# Patient Record
Sex: FEMALE | Race: WHITE | NOT HISPANIC OR LATINO | Employment: OTHER | URBAN - METROPOLITAN AREA
[De-identification: names, ages, dates, MRNs, and addresses within clinical notes are randomized per-mention and may not be internally consistent; named-entity substitution may affect disease eponyms.]

---

## 2017-11-20 ENCOUNTER — HISTORICAL (OUTPATIENT)
Dept: INTENSIVE CARE | Facility: HOSPITAL | Age: 47
End: 2017-11-20

## 2017-11-21 LAB
ABS NEUT (OLG): 8.38 X10(3)/MCL (ref 2.1–9.2)
ALBUMIN SERPL-MCNC: 2.5 GM/DL (ref 3.4–5)
ALBUMIN/GLOB SERPL: 0.8 {RATIO}
ALP SERPL-CCNC: 107 UNIT/L (ref 38–126)
ALT SERPL-CCNC: 27 UNIT/L (ref 12–78)
AST SERPL-CCNC: 36 UNIT/L (ref 15–37)
BASOPHILS # BLD AUTO: 0 X10(3)/MCL (ref 0–0.2)
BASOPHILS NFR BLD AUTO: 0 %
BILIRUB SERPL-MCNC: 0.3 MG/DL (ref 0.2–1)
BILIRUBIN DIRECT+TOT PNL SERPL-MCNC: 0.1 MG/DL (ref 0–0.2)
BILIRUBIN DIRECT+TOT PNL SERPL-MCNC: 0.2 MG/DL (ref 0–0.8)
BUN SERPL-MCNC: 13 MG/DL (ref 7–18)
CALCIUM SERPL-MCNC: 7.6 MG/DL (ref 8.5–10.1)
CHLORIDE SERPL-SCNC: 107 MMOL/L (ref 98–107)
CO2 SERPL-SCNC: 23 MMOL/L (ref 21–32)
CREAT SERPL-MCNC: 0.58 MG/DL (ref 0.55–1.02)
EOSINOPHIL # BLD AUTO: 0.1 X10(3)/MCL (ref 0–0.9)
EOSINOPHIL NFR BLD AUTO: 1 %
ERYTHROCYTE [DISTWIDTH] IN BLOOD BY AUTOMATED COUNT: 18.9 % (ref 11.5–17)
GLOBULIN SER-MCNC: 3.3 GM/DL (ref 2.4–3.5)
GLUCOSE SERPL-MCNC: 128 MG/DL (ref 74–106)
HCT VFR BLD AUTO: 32.8 % (ref 37–47)
HGB BLD-MCNC: 10 GM/DL (ref 12–16)
LYMPHOCYTES # BLD AUTO: 2.3 X10(3)/MCL (ref 0.6–4.6)
LYMPHOCYTES NFR BLD AUTO: 20 %
MCH RBC QN AUTO: 30.9 PG (ref 27–31)
MCHC RBC AUTO-ENTMCNC: 30.5 GM/DL (ref 33–36)
MCV RBC AUTO: 101.2 FL (ref 80–94)
MONOCYTES # BLD AUTO: 0.3 X10(3)/MCL (ref 0.1–1.3)
MONOCYTES NFR BLD AUTO: 3 %
NEUTROPHILS # BLD AUTO: 8.38 X10(3)/MCL (ref 1.4–7.9)
NEUTROPHILS NFR BLD AUTO: 75 %
PLATELET # BLD AUTO: 187 X10(3)/MCL (ref 130–400)
PMV BLD AUTO: 10.1 FL (ref 9.4–12.4)
POTASSIUM SERPL-SCNC: 3.9 MMOL/L (ref 3.5–5.1)
PROT SERPL-MCNC: 5.8 GM/DL (ref 6.4–8.2)
RBC # BLD AUTO: 3.24 X10(6)/MCL (ref 4.2–5.4)
SODIUM SERPL-SCNC: 139 MMOL/L (ref 136–145)
WBC # SPEC AUTO: 11.2 X10(3)/MCL (ref 4.5–11.5)

## 2018-01-31 ENCOUNTER — HISTORICAL (OUTPATIENT)
Dept: ADMINISTRATIVE | Facility: HOSPITAL | Age: 48
End: 2018-01-31

## 2018-01-31 LAB
ABS NEUT (OLG): 6.29 X10(3)/MCL (ref 2.1–9.2)
BASOPHILS # BLD AUTO: 0.1 X10(3)/MCL (ref 0–0.2)
BASOPHILS NFR BLD AUTO: 0.6 %
EOSINOPHIL # BLD AUTO: 0.2 X10(3)/MCL (ref 0–0.9)
EOSINOPHIL NFR BLD AUTO: 2.5 %
ERYTHROCYTE [DISTWIDTH] IN BLOOD BY AUTOMATED COUNT: 18.5 % (ref 11.5–17)
HCT VFR BLD AUTO: 29.9 % (ref 37–47)
HGB BLD-MCNC: 9.5 GM/DL (ref 12–16)
LYMPHOCYTES # BLD AUTO: 2.2 X10(3)/MCL (ref 0.6–4.6)
LYMPHOCYTES NFR BLD AUTO: 23.3 %
MCH RBC QN AUTO: 30.4 PG (ref 27–31)
MCHC RBC AUTO-ENTMCNC: 31.8 GM/DL (ref 33–36)
MCV RBC AUTO: 95.8 FL (ref 80–94)
MONOCYTES # BLD AUTO: 0.8 X10(3)/MCL (ref 0.1–1.3)
MONOCYTES NFR BLD AUTO: 7.9 %
NEUTROPHILS # BLD AUTO: 6.3 X10(3)/MCL (ref 2.1–9.2)
NEUTROPHILS NFR BLD AUTO: 65.7 %
PLATELET # BLD AUTO: 135 X10(3)/MCL (ref 130–400)
PMV BLD AUTO: 9.7 FL (ref 9.4–12.4)
RBC # BLD AUTO: 3.12 X10(6)/MCL (ref 4.2–5.4)
WBC # SPEC AUTO: 9.6 X10(3)/MCL (ref 4.5–11.5)

## 2018-05-14 ENCOUNTER — HISTORICAL (OUTPATIENT)
Dept: ADMINISTRATIVE | Facility: HOSPITAL | Age: 48
End: 2018-05-14

## 2018-05-14 LAB
ABS NEUT (OLG): 4.02 X10(3)/MCL (ref 2.1–9.2)
BASOPHILS # BLD AUTO: 0 X10(3)/MCL (ref 0–0.2)
BASOPHILS NFR BLD AUTO: 0.4 %
EOSINOPHIL # BLD AUTO: 0 X10(3)/MCL (ref 0–0.9)
EOSINOPHIL NFR BLD AUTO: 0.3 %
ERYTHROCYTE [DISTWIDTH] IN BLOOD BY AUTOMATED COUNT: 18.4 % (ref 11.5–17)
HCT VFR BLD AUTO: 34.2 % (ref 37–47)
HGB BLD-MCNC: 10.9 GM/DL (ref 12–16)
LYMPHOCYTES # BLD AUTO: 2.9 X10(3)/MCL (ref 0.6–4.6)
LYMPHOCYTES NFR BLD AUTO: 39.8 %
MCH RBC QN AUTO: 32.5 PG (ref 27–31)
MCHC RBC AUTO-ENTMCNC: 31.9 GM/DL (ref 33–36)
MCV RBC AUTO: 102.1 FL (ref 80–94)
MONOCYTES # BLD AUTO: 0.3 X10(3)/MCL (ref 0.1–1.3)
MONOCYTES NFR BLD AUTO: 3.9 %
NEUTROPHILS # BLD AUTO: 4 X10(3)/MCL (ref 2.1–9.2)
NEUTROPHILS NFR BLD AUTO: 55.6 %
PLATELET # BLD AUTO: 88 X10(3)/MCL (ref 130–400)
PMV BLD AUTO: 10.7 FL (ref 9.4–12.4)
RBC # BLD AUTO: 3.35 X10(6)/MCL (ref 4.2–5.4)
WBC # SPEC AUTO: 7.2 X10(3)/MCL (ref 4.5–11.5)

## 2018-05-19 ENCOUNTER — HOSPITAL ENCOUNTER (OUTPATIENT)
Dept: ADMINISTRATIVE | Facility: HOSPITAL | Age: 48
End: 2018-05-21
Attending: FAMILY MEDICINE | Admitting: FAMILY MEDICINE

## 2018-05-19 LAB
ABS NEUT (OLG): 2.16 X10(3)/MCL (ref 2.1–9.2)
ALBUMIN SERPL-MCNC: 2.5 GM/DL (ref 3.4–5)
ALBUMIN/GLOB SERPL: 0.6 {RATIO}
ALP SERPL-CCNC: 165 UNIT/L (ref 38–126)
ALT SERPL-CCNC: 21 UNIT/L (ref 12–78)
APPEARANCE, UA: CLEAR
AST SERPL-CCNC: 83 UNIT/L (ref 15–37)
BACTERIA SPEC CULT: ABNORMAL /HPF
BASOPHILS # BLD AUTO: 0 X10(3)/MCL (ref 0–0.2)
BASOPHILS NFR BLD AUTO: 0 %
BILIRUB SERPL-MCNC: 0.7 MG/DL (ref 0.2–1)
BILIRUB UR QL STRIP: NEGATIVE
BILIRUBIN DIRECT+TOT PNL SERPL-MCNC: 0.1 MG/DL (ref 0–0.5)
BILIRUBIN DIRECT+TOT PNL SERPL-MCNC: 0.6 MG/DL (ref 0–0.8)
BNP BLD-MCNC: 99 PG/ML (ref 0–100)
BUN SERPL-MCNC: 6 MG/DL (ref 7–18)
CALCIUM SERPL-MCNC: 7.6 MG/DL (ref 8.5–10.1)
CHLORIDE SERPL-SCNC: 109 MMOL/L (ref 98–107)
CO2 SERPL-SCNC: 23 MMOL/L (ref 21–32)
COLOR UR: ABNORMAL
CREAT SERPL-MCNC: 0.86 MG/DL (ref 0.55–1.02)
EOSINOPHIL # BLD AUTO: 0 X10(3)/MCL (ref 0–0.9)
EOSINOPHIL NFR BLD AUTO: 0 %
ERYTHROCYTE [DISTWIDTH] IN BLOOD BY AUTOMATED COUNT: 17.9 % (ref 11.5–17)
GLOBULIN SER-MCNC: 4.4 GM/DL (ref 2.4–3.5)
GLUCOSE (UA): NEGATIVE
GLUCOSE SERPL-MCNC: 103 MG/DL (ref 74–106)
HCT VFR BLD AUTO: 36 % (ref 37–47)
HGB BLD-MCNC: 11.5 GM/DL (ref 12–16)
HGB UR QL STRIP: NEGATIVE
KETONES UR QL STRIP: NEGATIVE
LEUKOCYTE ESTERASE UR QL STRIP: ABNORMAL
LIPASE SERPL-CCNC: 63 UNIT/L (ref 73–393)
LYMPHOCYTES # BLD AUTO: 3.1 X10(3)/MCL (ref 0.6–4.6)
LYMPHOCYTES NFR BLD AUTO: 56 %
MCH RBC QN AUTO: 32.9 PG (ref 27–31)
MCHC RBC AUTO-ENTMCNC: 31.9 GM/DL (ref 33–36)
MCV RBC AUTO: 102.9 FL (ref 80–94)
MONOCYTES # BLD AUTO: 0.2 X10(3)/MCL (ref 0.1–1.3)
MONOCYTES NFR BLD AUTO: 4 %
NEUTROPHILS # BLD AUTO: 2.16 X10(3)/MCL (ref 1.4–7.9)
NEUTROPHILS NFR BLD AUTO: 39 %
NITRITE UR QL STRIP: NEGATIVE
PH UR STRIP: 6.5 [PH] (ref 5–9)
PLATELET # BLD AUTO: 84 X10(3)/MCL (ref 130–400)
PMV BLD AUTO: 11.7 FL (ref 9.4–12.4)
POC TROPONIN: 0.01 NG/ML (ref 0–0.08)
POTASSIUM SERPL-SCNC: 3.4 MMOL/L (ref 3.5–5.1)
PROT SERPL-MCNC: 6.9 GM/DL (ref 6.4–8.2)
PROT UR QL STRIP: ABNORMAL
RBC # BLD AUTO: 3.5 X10(6)/MCL (ref 4.2–5.4)
RBC #/AREA URNS HPF: ABNORMAL /[HPF]
SODIUM SERPL-SCNC: 141 MMOL/L (ref 136–145)
SP GR UR STRIP: 1.02 (ref 1–1.03)
SQUAMOUS EPITHELIAL, UA: ABNORMAL
UA WBC MAN: ABNORMAL /HPF
UROBILINOGEN UR STRIP-ACNC: 1
WBC # SPEC AUTO: 5.6 X10(3)/MCL (ref 4.5–11.5)
WBC #/AREA URNS HPF: ABNORMAL /[HPF]

## 2018-05-20 LAB
ABS NEUT (OLG): 1.79 X10(3)/MCL (ref 2.1–9.2)
ALBUMIN SERPL-MCNC: 2 GM/DL (ref 3.4–5)
ALBUMIN/GLOB SERPL: 0.6 {RATIO}
ALP SERPL-CCNC: 133 UNIT/L (ref 38–126)
ALT SERPL-CCNC: 17 UNIT/L (ref 12–78)
ANISOCYTOSIS BLD QL SMEAR: 2
AST SERPL-CCNC: 68 UNIT/L (ref 15–37)
BILIRUB SERPL-MCNC: 0.5 MG/DL (ref 0.2–1)
BILIRUBIN DIRECT+TOT PNL SERPL-MCNC: 0.2 MG/DL (ref 0–0.2)
BILIRUBIN DIRECT+TOT PNL SERPL-MCNC: 0.3 MG/DL (ref 0–0.8)
BUN SERPL-MCNC: 5 MG/DL (ref 7–18)
CALCIUM SERPL-MCNC: 6.9 MG/DL (ref 8.5–10.1)
CHLORIDE SERPL-SCNC: 111 MMOL/L (ref 98–107)
CO2 SERPL-SCNC: 23 MMOL/L (ref 21–32)
CREAT SERPL-MCNC: 0.76 MG/DL (ref 0.55–1.02)
ERYTHROCYTE [DISTWIDTH] IN BLOOD BY AUTOMATED COUNT: 17.8 % (ref 11.5–17)
GLOBULIN SER-MCNC: 3.5 GM/DL (ref 2.4–3.5)
GLUCOSE SERPL-MCNC: 75 MG/DL (ref 74–106)
HCT VFR BLD AUTO: 29.9 % (ref 37–47)
HGB BLD-MCNC: 9.4 GM/DL (ref 12–16)
LYMPHOCYTES NFR BLD MANUAL: 45 % (ref 13–40)
MAGNESIUM SERPL-MCNC: 1.9 MG/DL (ref 1.8–2.4)
MCH RBC QN AUTO: 32.1 PG (ref 27–31)
MCHC RBC AUTO-ENTMCNC: 31.4 GM/DL (ref 33–36)
MCV RBC AUTO: 102 FL (ref 80–94)
MYELOCYTES NFR BLD MANUAL: 1 %
NEUTROPHILS NFR BLD MANUAL: 54 % (ref 47–80)
OVALOCYTES BLD QL SMEAR: 1
PLATELET # BLD AUTO: 73 X10(3)/MCL (ref 130–400)
PLATELET # BLD EST: ABNORMAL 10*3/UL
PMV BLD AUTO: 11.5 FL (ref 7.4–10.4)
POIKILOCYTOSIS BLD QL SMEAR: 2
POLYCHROMASIA BLD QL SMEAR: 1
POTASSIUM SERPL-SCNC: 3.2 MMOL/L (ref 3.5–5.1)
PROT SERPL-MCNC: 5.5 GM/DL (ref 6.4–8.2)
RBC # BLD AUTO: 2.93 X10(6)/MCL (ref 4.2–5.4)
SCHISTOCYTES BLD QL AUTO: 1
SODIUM SERPL-SCNC: 143 MMOL/L (ref 136–145)
T4 FREE SERPL-MCNC: 1.17 NG/DL (ref 0.76–4.46)
TARGETS BLD QL SMEAR: 1
TSH SERPL-ACNC: 5.11 MIU/L (ref 0.36–3.74)
WBC # SPEC AUTO: 5.3 X10(3)/MCL (ref 4.5–11.5)

## 2018-05-21 LAB
BUN SERPL-MCNC: 7 MG/DL (ref 7–18)
CALCIUM SERPL-MCNC: 7.5 MG/DL (ref 8.5–10.1)
CHLORIDE SERPL-SCNC: 114 MMOL/L (ref 98–107)
CO2 SERPL-SCNC: 20 MMOL/L (ref 21–32)
CREAT SERPL-MCNC: 0.82 MG/DL (ref 0.55–1.02)
CREAT/UREA NIT SERPL: 8.5
ERYTHROCYTE [DISTWIDTH] IN BLOOD BY AUTOMATED COUNT: 18 % (ref 11.5–17)
GLUCOSE SERPL-MCNC: 159 MG/DL (ref 74–106)
HCT VFR BLD AUTO: 33.3 % (ref 37–47)
HGB BLD-MCNC: 10.3 GM/DL (ref 12–16)
MCH RBC QN AUTO: 32.4 PG (ref 27–31)
MCHC RBC AUTO-ENTMCNC: 30.9 GM/DL (ref 33–36)
MCV RBC AUTO: 104.7 FL (ref 80–94)
PLATELET # BLD AUTO: 72 X10(3)/MCL (ref 130–400)
PMV BLD AUTO: 12.5 FL (ref 9.4–12.4)
POTASSIUM SERPL-SCNC: 4.4 MMOL/L (ref 3.5–5.1)
RBC # BLD AUTO: 3.18 X10(6)/MCL (ref 4.2–5.4)
SODIUM SERPL-SCNC: 143 MMOL/L (ref 136–145)
WBC # SPEC AUTO: 2.6 X10(3)/MCL (ref 4.5–11.5)

## 2018-08-02 LAB
ABS NEUT (OLG): 5.02 X10(3)/MCL (ref 2.1–9.2)
ALBUMIN SERPL-MCNC: 2.7 GM/DL (ref 3.4–5)
ALBUMIN/GLOB SERPL: 0.7 RATIO (ref 1.1–2)
ALP SERPL-CCNC: 188 UNIT/L (ref 38–126)
ALT SERPL-CCNC: 13 UNIT/L (ref 12–78)
APTT PPP: 73.6 SECOND(S) (ref 24.8–36.9)
AST SERPL-CCNC: 50 UNIT/L (ref 15–37)
BASOPHILS # BLD AUTO: 0 X10(3)/MCL (ref 0–0.2)
BASOPHILS NFR BLD AUTO: 0 %
BILIRUB SERPL-MCNC: 0.8 MG/DL (ref 0.2–1)
BILIRUBIN DIRECT+TOT PNL SERPL-MCNC: 0.4 MG/DL (ref 0–0.5)
BILIRUBIN DIRECT+TOT PNL SERPL-MCNC: 0.4 MG/DL (ref 0–0.8)
BUN SERPL-MCNC: 8 MG/DL (ref 7–18)
CALCIUM SERPL-MCNC: 7.8 MG/DL (ref 8.5–10.1)
CHLORIDE SERPL-SCNC: 107 MMOL/L (ref 98–107)
CHOLEST SERPL-MCNC: 67 MG/DL (ref 0–200)
CHOLEST/HDLC SERPL: 3.9 {RATIO} (ref 0–4)
CO2 SERPL-SCNC: 22 MMOL/L (ref 21–32)
CREAT SERPL-MCNC: 0.79 MG/DL (ref 0.55–1.02)
ERYTHROCYTE [DISTWIDTH] IN BLOOD BY AUTOMATED COUNT: 15.9 % (ref 11.5–17)
GLOBULIN SER-MCNC: 3.9 GM/DL (ref 2.4–3.5)
GLUCOSE SERPL-MCNC: 107 MG/DL (ref 74–106)
GROUP & RH: NORMAL
HCT VFR BLD AUTO: 30 % (ref 37–47)
HDLC SERPL-MCNC: 17 MG/DL (ref 35–60)
HGB BLD-MCNC: 9.9 GM/DL (ref 12–16)
INR PPP: 1.27 (ref 0–1.27)
LDLC SERPL CALC-MCNC: 21 MG/DL (ref 0–129)
LYMPHOCYTES # BLD AUTO: 2.3 X10(3)/MCL (ref 0.6–4.6)
LYMPHOCYTES NFR BLD AUTO: 30 %
MCH RBC QN AUTO: 30.8 PG (ref 27–31)
MCHC RBC AUTO-ENTMCNC: 33 GM/DL (ref 33–36)
MCV RBC AUTO: 93.5 FL (ref 80–94)
MONOCYTES # BLD AUTO: 0.3 X10(3)/MCL (ref 0.1–1.3)
MONOCYTES NFR BLD AUTO: 4 %
NEUTROPHILS # BLD AUTO: 5.02 X10(3)/MCL (ref 1.4–7.9)
NEUTROPHILS NFR BLD AUTO: 65 %
PLATELET # BLD AUTO: 72 X10(3)/MCL (ref 130–400)
PMV BLD AUTO: 10.9 FL (ref 9.4–12.4)
POTASSIUM SERPL-SCNC: 3 MMOL/L (ref 3.5–5.1)
PROT SERPL-MCNC: 6.6 GM/DL (ref 6.4–8.2)
PROTHROMBIN TIME: 16.3 SECOND(S) (ref 12.2–14.7)
RBC # BLD AUTO: 3.21 X10(6)/MCL (ref 4.2–5.4)
SODIUM SERPL-SCNC: 138 MMOL/L (ref 136–145)
TRIGL SERPL-MCNC: 145 MG/DL (ref 30–150)
VLDLC SERPL CALC-MCNC: 29 MG/DL
WBC # SPEC AUTO: 7.8 X10(3)/MCL (ref 4.5–11.5)

## 2018-08-03 ENCOUNTER — HISTORICAL (OUTPATIENT)
Dept: MEDSURG UNIT | Facility: HOSPITAL | Age: 48
End: 2018-08-03

## 2018-08-04 LAB
ABS NEUT (OLG): 3.75 X10(3)/MCL (ref 2.1–9.2)
ALBUMIN SERPL-MCNC: 2.2 GM/DL (ref 3.4–5)
ALBUMIN/GLOB SERPL: 0.6 {RATIO}
ALP SERPL-CCNC: 162 UNIT/L (ref 38–126)
ALT SERPL-CCNC: 15 UNIT/L (ref 12–78)
AST SERPL-CCNC: 71 UNIT/L (ref 15–37)
BASOPHILS # BLD AUTO: 0 X10(3)/MCL (ref 0–0.2)
BASOPHILS NFR BLD AUTO: 0 %
BILIRUB SERPL-MCNC: 0.4 MG/DL (ref 0.2–1)
BILIRUBIN DIRECT+TOT PNL SERPL-MCNC: 0.1 MG/DL (ref 0–0.8)
BILIRUBIN DIRECT+TOT PNL SERPL-MCNC: 0.3 MG/DL (ref 0–0.2)
BUN SERPL-MCNC: 9 MG/DL (ref 7–18)
BUN SERPL-MCNC: 9 MG/DL (ref 7–18)
CALCIUM SERPL-MCNC: 7 MG/DL (ref 8.5–10.1)
CALCIUM SERPL-MCNC: 7 MG/DL (ref 8.5–10.1)
CHLORIDE SERPL-SCNC: 111 MMOL/L (ref 98–107)
CHLORIDE SERPL-SCNC: 111 MMOL/L (ref 98–107)
CO2 SERPL-SCNC: 21 MMOL/L (ref 21–32)
CO2 SERPL-SCNC: 21 MMOL/L (ref 21–32)
CREAT SERPL-MCNC: 0.86 MG/DL (ref 0.55–1.02)
CREAT SERPL-MCNC: 0.86 MG/DL (ref 0.55–1.02)
CREAT/UREA NIT SERPL: 10.5
ERYTHROCYTE [DISTWIDTH] IN BLOOD BY AUTOMATED COUNT: 16.5 % (ref 11.5–17)
GLOBULIN SER-MCNC: 3.7 GM/DL (ref 2.4–3.5)
GLUCOSE SERPL-MCNC: 162 MG/DL (ref 74–106)
GLUCOSE SERPL-MCNC: 162 MG/DL (ref 74–106)
HCT VFR BLD AUTO: 29.2 % (ref 37–47)
HGB BLD-MCNC: 9.1 GM/DL (ref 12–16)
LYMPHOCYTES # BLD AUTO: 1 X10(3)/MCL (ref 0.6–4.6)
LYMPHOCYTES NFR BLD AUTO: 21 %
MCH RBC QN AUTO: 30.3 PG (ref 27–31)
MCHC RBC AUTO-ENTMCNC: 31.2 GM/DL (ref 33–36)
MCV RBC AUTO: 97.3 FL (ref 80–94)
MONOCYTES # BLD AUTO: 0.2 X10(3)/MCL (ref 0.1–1.3)
MONOCYTES NFR BLD AUTO: 3 %
NEUTROPHILS # BLD AUTO: 3.75 X10(3)/MCL (ref 1.4–7.9)
NEUTROPHILS NFR BLD AUTO: 75 %
PLATELET # BLD AUTO: 66 X10(3)/MCL (ref 130–400)
PMV BLD AUTO: 12.7 FL (ref 9.4–12.4)
POTASSIUM SERPL-SCNC: 4 MMOL/L (ref 3.5–5.1)
POTASSIUM SERPL-SCNC: 4 MMOL/L (ref 3.5–5.1)
PROT SERPL-MCNC: 5.9 GM/DL (ref 6.4–8.2)
RBC # BLD AUTO: 3 X10(6)/MCL (ref 4.2–5.4)
SODIUM SERPL-SCNC: 142 MMOL/L (ref 136–145)
SODIUM SERPL-SCNC: 142 MMOL/L (ref 136–145)
WBC # SPEC AUTO: 5 X10(3)/MCL (ref 4.5–11.5)

## 2019-01-12 ENCOUNTER — HOSPITAL ENCOUNTER (OUTPATIENT)
Dept: MEDSURG UNIT | Facility: HOSPITAL | Age: 49
End: 2019-01-14
Attending: EMERGENCY MEDICINE | Admitting: SURGERY

## 2019-01-12 LAB
ABS NEUT (OLG): 8.12 X10(3)/MCL (ref 2.1–9.2)
ALBUMIN SERPL-MCNC: 2.6 GM/DL (ref 3.4–5)
ALBUMIN/GLOB SERPL: 0.6 {RATIO}
ALP SERPL-CCNC: 149 UNIT/L (ref 38–126)
ALT SERPL-CCNC: 23 UNIT/L (ref 12–78)
APTT PPP: 73.7 SECOND(S) (ref 24.8–36.9)
AST SERPL-CCNC: 38 UNIT/L (ref 15–37)
BASOPHILS # BLD AUTO: 0 X10(3)/MCL (ref 0–0.2)
BASOPHILS NFR BLD AUTO: 0 %
BILIRUB SERPL-MCNC: 0.6 MG/DL (ref 0.2–1)
BILIRUBIN DIRECT+TOT PNL SERPL-MCNC: 0.1 MG/DL (ref 0–0.5)
BILIRUBIN DIRECT+TOT PNL SERPL-MCNC: 0.5 MG/DL (ref 0–0.8)
BNP BLD-MCNC: 8 PG/ML (ref 0–125)
BUN SERPL-MCNC: 14 MG/DL (ref 7–18)
CALCIUM SERPL-MCNC: 8 MG/DL (ref 8.5–10.1)
CHLORIDE SERPL-SCNC: 104 MMOL/L (ref 98–107)
CO2 SERPL-SCNC: 22 MMOL/L (ref 21–32)
CREAT SERPL-MCNC: 1.02 MG/DL (ref 0.55–1.02)
EOSINOPHIL # BLD AUTO: 0.2 X10(3)/MCL (ref 0–0.9)
EOSINOPHIL NFR BLD AUTO: 2 %
ERYTHROCYTE [DISTWIDTH] IN BLOOD BY AUTOMATED COUNT: 14.7 % (ref 11.5–17)
GLOBULIN SER-MCNC: 4.3 GM/DL (ref 2.4–3.5)
GLUCOSE SERPL-MCNC: 133 MG/DL (ref 74–106)
HCT VFR BLD AUTO: 42.6 % (ref 37–47)
HGB BLD-MCNC: 13.4 GM/DL (ref 12–16)
INR PPP: 1.1 (ref 0–1.3)
LYMPHOCYTES # BLD AUTO: 2.1 X10(3)/MCL (ref 0.6–4.6)
LYMPHOCYTES NFR BLD AUTO: 20 %
MCH RBC QN AUTO: 30.2 PG (ref 27–31)
MCHC RBC AUTO-ENTMCNC: 31.5 GM/DL (ref 33–36)
MCV RBC AUTO: 95.9 FL (ref 80–94)
MONOCYTES # BLD AUTO: 0.3 X10(3)/MCL (ref 0.1–1.3)
MONOCYTES NFR BLD AUTO: 2 %
NEUTROPHILS # BLD AUTO: 8.12 X10(3)/MCL (ref 2.1–9.2)
NEUTROPHILS NFR BLD AUTO: 76 %
NRBC BLD AUTO-RTO: 0.3 % (ref 0–0.2)
PLATELET # BLD AUTO: 138 X10(3)/MCL (ref 130–400)
PMV BLD AUTO: 10.9 FL (ref 9.4–12.4)
POTASSIUM SERPL-SCNC: 4.7 MMOL/L (ref 3.5–5.1)
PROT SERPL-MCNC: 6.9 GM/DL (ref 6.4–8.2)
PROTHROMBIN TIME: 14.7 SECOND(S) (ref 12.2–14.7)
RBC # BLD AUTO: 4.44 X10(6)/MCL (ref 4.2–5.4)
SODIUM SERPL-SCNC: 136 MMOL/L (ref 136–145)
TROPONIN I SERPL-MCNC: <0.02 NG/ML (ref 0.02–0.49)
WBC # SPEC AUTO: 10.8 X10(3)/MCL (ref 4.5–11.5)

## 2019-02-19 ENCOUNTER — HISTORICAL (OUTPATIENT)
Dept: LAB | Facility: HOSPITAL | Age: 49
End: 2019-02-19

## 2019-02-19 LAB
APPEARANCE, UA: ABNORMAL
BACTERIA #/AREA URNS AUTO: ABNORMAL /HPF
BILIRUB UR QL STRIP: ABNORMAL
COLOR UR: ABNORMAL
GLUCOSE (UA): NEGATIVE
HGB UR QL STRIP: ABNORMAL
KETONES UR QL STRIP: NEGATIVE
LEUKOCYTE ESTERASE UR QL STRIP: ABNORMAL
NITRITE UR QL STRIP.AUTO: POSITIVE
PH UR STRIP: 5.5 [PH] (ref 5–9)
PROT UR QL STRIP: ABNORMAL
RBC #/AREA URNS HPF: 16 /HPF (ref 0–2)
SP GR UR STRIP: 1.02 (ref 1–1.03)
SQUAMOUS EPITHELIAL, UA: ABNORMAL
T3FREE SERPL-MCNC: 3.39 PG/ML (ref 2.18–3.98)
T4 FREE SERPL-MCNC: 1.57 NG/DL (ref 0.76–1.46)
TSH SERPL-ACNC: 1.9 MIU/L (ref 0.36–3.74)
UROBILINOGEN UR STRIP-ACNC: 1
WBC #/AREA URNS AUTO: 44 /HPF (ref 0–3)

## 2019-03-20 ENCOUNTER — HISTORICAL (OUTPATIENT)
Dept: LAB | Facility: HOSPITAL | Age: 49
End: 2019-03-20

## 2019-03-20 LAB
APPEARANCE, UA: CLEAR
BACTERIA #/AREA URNS AUTO: ABNORMAL /HPF
BILIRUB UR QL STRIP: NEGATIVE
COLOR UR: YELLOW
GLUCOSE (UA): NEGATIVE
HGB UR QL STRIP: ABNORMAL
KETONES UR QL STRIP: NEGATIVE
LEUKOCYTE ESTERASE UR QL STRIP: NEGATIVE
NITRITE UR QL STRIP.AUTO: NEGATIVE
PH UR STRIP: 5.5 [PH] (ref 5–9)
PROT UR QL STRIP: ABNORMAL
RBC #/AREA URNS HPF: 9 /HPF (ref 0–2)
SP GR UR STRIP: 1.02 (ref 1–1.03)
SQUAMOUS EPITHELIAL, UA: ABNORMAL
UROBILINOGEN UR STRIP-ACNC: 0.2
WBC #/AREA URNS AUTO: ABNORMAL /HPF (ref 0–3)

## 2019-04-17 ENCOUNTER — HOSPITAL ENCOUNTER (OUTPATIENT)
Dept: NUTRITION | Facility: HOSPITAL | Age: 49
End: 2019-04-18
Admitting: FAMILY MEDICINE

## 2019-04-17 LAB
ABS NEUT (OLG): 9.08 X10(3)/MCL (ref 2.1–9.2)
ALBUMIN SERPL-MCNC: 3.1 GM/DL (ref 3.4–5)
ALBUMIN/GLOB SERPL: 0.7 RATIO (ref 1.1–2)
ALP SERPL-CCNC: 95 UNIT/L (ref 38–126)
ALT SERPL-CCNC: 26 UNIT/L (ref 12–78)
AST SERPL-CCNC: 20 UNIT/L (ref 15–37)
BASOPHILS # BLD AUTO: 0.1 X10(3)/MCL (ref 0–0.2)
BASOPHILS NFR BLD AUTO: 0 %
BILIRUB SERPL-MCNC: 0.2 MG/DL (ref 0.2–1)
BILIRUBIN DIRECT+TOT PNL SERPL-MCNC: 0.1 MG/DL (ref 0–0.5)
BILIRUBIN DIRECT+TOT PNL SERPL-MCNC: 0.1 MG/DL (ref 0–0.8)
BUN SERPL-MCNC: 27 MG/DL (ref 7–18)
CALCIUM SERPL-MCNC: 8.7 MG/DL (ref 8.5–10.1)
CHLORIDE SERPL-SCNC: 110 MMOL/L (ref 98–107)
CK SERPL-CCNC: 28 UNIT/L (ref 26–192)
CO2 SERPL-SCNC: 26 MMOL/L (ref 21–32)
CREAT SERPL-MCNC: 1.02 MG/DL (ref 0.55–1.02)
CRP SERPL HS-MCNC: 6.22 MG/L (ref 0–3)
EOSINOPHIL # BLD AUTO: 0.1 X10(3)/MCL (ref 0–0.9)
EOSINOPHIL NFR BLD AUTO: 1 %
ERYTHROCYTE [DISTWIDTH] IN BLOOD BY AUTOMATED COUNT: 16.6 % (ref 11.5–17)
ERYTHROCYTE [SEDIMENTATION RATE] IN BLOOD: 63 MM/HR (ref 0–20)
GLOBULIN SER-MCNC: 4.3 GM/DL (ref 2.4–3.5)
GLUCOSE SERPL-MCNC: 63 MG/DL (ref 74–106)
HCT VFR BLD AUTO: 44.8 % (ref 37–47)
HGB BLD-MCNC: 13.5 GM/DL (ref 12–16)
LYMPHOCYTES # BLD AUTO: 3.4 X10(3)/MCL (ref 0.6–4.6)
LYMPHOCYTES NFR BLD AUTO: 26 %
MCH RBC QN AUTO: 29.8 PG (ref 27–31)
MCHC RBC AUTO-ENTMCNC: 30.1 GM/DL (ref 33–36)
MCV RBC AUTO: 98.9 FL (ref 80–94)
MONOCYTES # BLD AUTO: 0.5 X10(3)/MCL (ref 0.1–1.3)
MONOCYTES NFR BLD AUTO: 4 %
NEUTROPHILS # BLD AUTO: 9.08 X10(3)/MCL (ref 2.1–9.2)
NEUTROPHILS NFR BLD AUTO: 68 %
PLATELET # BLD AUTO: 261 X10(3)/MCL (ref 130–400)
PMV BLD AUTO: 9.3 FL (ref 9.4–12.4)
POTASSIUM SERPL-SCNC: 3.7 MMOL/L (ref 3.5–5.1)
PROT SERPL-MCNC: 7.4 GM/DL (ref 6.4–8.2)
RBC # BLD AUTO: 4.53 X10(6)/MCL (ref 4.2–5.4)
SODIUM SERPL-SCNC: 142 MMOL/L (ref 136–145)
WBC # SPEC AUTO: 13.4 X10(3)/MCL (ref 4.5–11.5)

## 2019-05-01 ENCOUNTER — HISTORICAL (OUTPATIENT)
Dept: CARDIOLOGY | Facility: HOSPITAL | Age: 49
End: 2019-05-01

## 2019-05-01 LAB
BUN SERPL-MCNC: 22 MG/DL (ref 7–18)
CALCIUM SERPL-MCNC: 8.8 MG/DL (ref 8.5–10.1)
CHLORIDE SERPL-SCNC: 108 MMOL/L (ref 98–107)
CO2 SERPL-SCNC: 27 MMOL/L (ref 21–32)
CREAT SERPL-MCNC: 0.89 MG/DL (ref 0.55–1.02)
CREAT/UREA NIT SERPL: 24.7
GLUCOSE SERPL-MCNC: 105 MG/DL (ref 74–106)
POTASSIUM SERPL-SCNC: 3.8 MMOL/L (ref 3.5–5.1)
SODIUM SERPL-SCNC: 141 MMOL/L (ref 136–145)

## 2019-06-27 ENCOUNTER — HISTORICAL (OUTPATIENT)
Dept: RADIOLOGY | Facility: HOSPITAL | Age: 49
End: 2019-06-27

## 2019-12-09 LAB
APPEARANCE, UA: ABNORMAL
BACTERIA SPEC CULT: ABNORMAL /HPF
BILIRUB UR QL STRIP: NEGATIVE
COLOR UR: YELLOW
CREAT UR-MCNC: 153 MG/DL
GLUCOSE (UA): NEGATIVE
HGB UR QL STRIP: ABNORMAL
KETONES UR QL STRIP: NEGATIVE
LEUKOCYTE ESTERASE UR QL STRIP: ABNORMAL
MICROALBUMIN UR-MCNC: 85.8 MG/DL
MICROALBUMIN/CREAT RATIO PNL UR: 560.8 MG/GM CR (ref 0–30)
NITRITE UR QL STRIP: POSITIVE
PH UR STRIP: 6 [PH] (ref 5–9)
PROT UR QL STRIP: ABNORMAL
RBC #/AREA URNS HPF: 13 /HPF (ref 0–2)
SP GR UR STRIP: 1.02 (ref 1–1.03)
SQUAMOUS EPITHELIAL, UA: ABNORMAL
UROBILINOGEN UR STRIP-ACNC: 1
WBC #/AREA URNS HPF: 56 /HPF (ref 0–3)

## 2019-12-10 ENCOUNTER — HISTORICAL (OUTPATIENT)
Dept: LAB | Facility: HOSPITAL | Age: 49
End: 2019-12-10

## 2020-01-14 ENCOUNTER — HISTORICAL (OUTPATIENT)
Dept: LAB | Facility: HOSPITAL | Age: 50
End: 2020-01-14

## 2020-01-14 LAB
ABS NEUT (OLG): 8.64 X10(3)/MCL (ref 2.1–9.2)
ALBUMIN SERPL-MCNC: 2.9 GM/DL (ref 3.4–5)
ALBUMIN/GLOB SERPL: 0.7 RATIO (ref 1.1–2)
ALP SERPL-CCNC: 108 UNIT/L (ref 38–126)
ALT SERPL-CCNC: 21 UNIT/L (ref 12–78)
AST SERPL-CCNC: 29 UNIT/L (ref 15–37)
BASOPHILS # BLD AUTO: 0 X10(3)/MCL (ref 0–0.2)
BASOPHILS NFR BLD AUTO: 0 %
BILIRUB SERPL-MCNC: 0.3 MG/DL (ref 0.2–1)
BILIRUBIN DIRECT+TOT PNL SERPL-MCNC: 0.1 MG/DL (ref 0–0.5)
BILIRUBIN DIRECT+TOT PNL SERPL-MCNC: 0.2 MG/DL (ref 0–0.8)
BUN SERPL-MCNC: 17 MG/DL (ref 7–18)
CALCIUM SERPL-MCNC: 8.8 MG/DL (ref 8.5–10.1)
CHLORIDE SERPL-SCNC: 113 MMOL/L (ref 98–107)
CO2 SERPL-SCNC: 22 MMOL/L (ref 21–32)
CREAT SERPL-MCNC: 0.75 MG/DL (ref 0.55–1.02)
EOSINOPHIL # BLD AUTO: 0 X10(3)/MCL (ref 0–0.9)
EOSINOPHIL NFR BLD AUTO: 0 %
ERYTHROCYTE [DISTWIDTH] IN BLOOD BY AUTOMATED COUNT: 15.8 % (ref 11.5–17)
EST. AVERAGE GLUCOSE BLD GHB EST-MCNC: 169 MG/DL
GLOBULIN SER-MCNC: 3.9 GM/DL (ref 2.4–3.5)
GLUCOSE SERPL-MCNC: 64 MG/DL (ref 74–106)
HBA1C MFR BLD: 7.5 % (ref 4.2–6.3)
HCT VFR BLD AUTO: 40.9 % (ref 37–47)
HGB BLD-MCNC: 12.6 GM/DL (ref 12–16)
LYMPHOCYTES # BLD AUTO: 1.9 X10(3)/MCL (ref 0.6–4.6)
LYMPHOCYTES NFR BLD AUTO: 17 %
MCH RBC QN AUTO: 29.3 PG (ref 27–31)
MCHC RBC AUTO-ENTMCNC: 30.8 GM/DL (ref 33–36)
MCV RBC AUTO: 95.1 FL (ref 80–94)
MONOCYTES # BLD AUTO: 0.4 X10(3)/MCL (ref 0.1–1.3)
MONOCYTES NFR BLD AUTO: 4 %
NEUTROPHILS # BLD AUTO: 8.64 X10(3)/MCL (ref 2.1–9.2)
NEUTROPHILS NFR BLD AUTO: 78 %
PLATELET # BLD AUTO: 213 X10(3)/MCL (ref 130–400)
PMV BLD AUTO: 10.3 FL (ref 9.4–12.4)
POTASSIUM SERPL-SCNC: 4.1 MMOL/L (ref 3.5–5.1)
PROT SERPL-MCNC: 6.8 GM/DL (ref 6.4–8.2)
RBC # BLD AUTO: 4.3 X10(6)/MCL (ref 4.2–5.4)
SODIUM SERPL-SCNC: 142 MMOL/L (ref 136–145)
T3FREE SERPL-MCNC: 2.02 PG/ML (ref 2.18–3.98)
T4 FREE SERPL-MCNC: 0.97 NG/DL (ref 0.76–1.46)
TSH SERPL-ACNC: 1.18 MIU/L (ref 0.36–3.74)
WBC # SPEC AUTO: 11.1 X10(3)/MCL (ref 4.5–11.5)

## 2020-05-28 ENCOUNTER — HOSPITAL ENCOUNTER (OUTPATIENT)
Dept: MEDSURG UNIT | Facility: HOSPITAL | Age: 50
End: 2020-05-30
Attending: INTERNAL MEDICINE | Admitting: INTERNAL MEDICINE

## 2020-05-28 LAB
ABS NEUT (OLG): 6.58 X10(3)/MCL (ref 2.1–9.2)
ALBUMIN SERPL-MCNC: 2.8 GM/DL (ref 3.5–5)
ALBUMIN/GLOB SERPL: 0.8 RATIO (ref 1.1–2)
ALP SERPL-CCNC: 119 UNIT/L (ref 40–150)
ALT SERPL-CCNC: 27 UNIT/L (ref 0–55)
APPEARANCE, UA: ABNORMAL
AST SERPL-CCNC: 42 UNIT/L (ref 5–34)
BACTERIA SPEC CULT: ABNORMAL /HPF
BASOPHILS # BLD AUTO: 0.1 X10(3)/MCL (ref 0–0.2)
BASOPHILS NFR BLD AUTO: 1 %
BILIRUB SERPL-MCNC: 0.5 MG/DL
BILIRUB UR QL STRIP: NEGATIVE
BILIRUBIN DIRECT+TOT PNL SERPL-MCNC: 0.1 MG/DL (ref 0–0.5)
BILIRUBIN DIRECT+TOT PNL SERPL-MCNC: 0.4 MG/DL (ref 0–0.8)
BUN SERPL-MCNC: 6.8 MG/DL (ref 7–18.7)
CALCIUM SERPL-MCNC: 8.1 MG/DL (ref 8.4–10.2)
CHLORIDE SERPL-SCNC: 106 MMOL/L (ref 98–107)
CO2 SERPL-SCNC: 19 MMOL/L (ref 22–29)
COLOR UR: YELLOW
CREAT SERPL-MCNC: 0.76 MG/DL (ref 0.55–1.02)
EOSINOPHIL # BLD AUTO: 0.2 X10(3)/MCL (ref 0–0.9)
EOSINOPHIL NFR BLD AUTO: 2 %
ERYTHROCYTE [DISTWIDTH] IN BLOOD BY AUTOMATED COUNT: 15.4 % (ref 11.5–17)
GLOBULIN SER-MCNC: 3.7 GM/DL (ref 2.4–3.5)
GLUCOSE (UA): NEGATIVE
GLUCOSE SERPL-MCNC: 135 MG/DL (ref 74–100)
HCT VFR BLD AUTO: 42.3 % (ref 37–47)
HGB BLD-MCNC: 13 GM/DL (ref 12–16)
HGB UR QL STRIP: ABNORMAL
IMM GRANULOCYTES # BLD AUTO: 0 10*3/UL
IMM GRANULOCYTES NFR BLD AUTO: 1 %
KETONES UR QL STRIP: NEGATIVE
LACTATE SERPL-SCNC: 1.7 MMOL/L (ref 0.5–2.2)
LACTATE SERPL-SCNC: 2.2 MMOL/L (ref 0.5–2.2)
LEUKOCYTE ESTERASE UR QL STRIP: ABNORMAL
LYMPHOCYTES # BLD AUTO: 2.2 X10(3)/MCL (ref 0.6–4.6)
LYMPHOCYTES NFR BLD AUTO: 23 %
MCH RBC QN AUTO: 28.8 PG (ref 27–31)
MCHC RBC AUTO-ENTMCNC: 30.7 GM/DL (ref 33–36)
MCV RBC AUTO: 93.8 FL (ref 80–94)
MONOCYTES # BLD AUTO: 0.4 X10(3)/MCL (ref 0.1–1.3)
MONOCYTES NFR BLD AUTO: 4 %
NEUTROPHILS # BLD AUTO: 6.58 X10(3)/MCL (ref 2.1–9.2)
NEUTROPHILS NFR BLD AUTO: 69 %
NITRITE UR QL STRIP: NEGATIVE
PH UR STRIP: 5.5 [PH] (ref 5–9)
PLATELET # BLD AUTO: 238 X10(3)/MCL (ref 130–400)
PMV BLD AUTO: 9.8 FL (ref 9.4–12.4)
POTASSIUM SERPL-SCNC: 4.1 MMOL/L (ref 3.5–5.1)
PROT SERPL-MCNC: 6.5 GM/DL (ref 6.4–8.3)
PROT UR QL STRIP: ABNORMAL
RBC # BLD AUTO: 4.51 X10(6)/MCL (ref 4.2–5.4)
RBC #/AREA URNS HPF: ABNORMAL /HPF
SODIUM SERPL-SCNC: 137 MMOL/L (ref 136–145)
SP GR UR STRIP: 1.01 (ref 1–1.03)
SQUAMOUS EPITHELIAL, UA: 25 /HPF (ref 0–4)
UROBILINOGEN UR STRIP-ACNC: 0.2
WBC # SPEC AUTO: 9.5 X10(3)/MCL (ref 4.5–11.5)
WBC #/AREA URNS HPF: 40 /HPF (ref 0–3)

## 2020-05-29 LAB
ABS NEUT (OLG): 3.51 X10(3)/MCL (ref 2.1–9.2)
BASOPHILS # BLD AUTO: 0 X10(3)/MCL (ref 0–0.2)
BASOPHILS NFR BLD AUTO: 0 %
BUN SERPL-MCNC: 9.5 MG/DL (ref 7–18.7)
CALCIUM SERPL-MCNC: 7.9 MG/DL (ref 8.4–10.2)
CHLORIDE SERPL-SCNC: 106 MMOL/L (ref 98–107)
CO2 SERPL-SCNC: 25 MMOL/L (ref 22–29)
CREAT SERPL-MCNC: 0.76 MG/DL (ref 0.55–1.02)
CREAT/UREA NIT SERPL: 12
EOSINOPHIL # BLD AUTO: 0.1 X10(3)/MCL (ref 0–0.9)
EOSINOPHIL NFR BLD AUTO: 2 %
ERYTHROCYTE [DISTWIDTH] IN BLOOD BY AUTOMATED COUNT: 15.5 % (ref 11.5–17)
GLUCOSE SERPL-MCNC: 120 MG/DL (ref 74–100)
HCT VFR BLD AUTO: 36.2 % (ref 37–47)
HGB BLD-MCNC: 11.2 GM/DL (ref 12–16)
LYMPHOCYTES # BLD AUTO: 2.1 X10(3)/MCL (ref 0.6–4.6)
LYMPHOCYTES NFR BLD AUTO: 34 %
MCH RBC QN AUTO: 28.7 PG (ref 27–31)
MCHC RBC AUTO-ENTMCNC: 30.9 GM/DL (ref 33–36)
MCV RBC AUTO: 92.8 FL (ref 80–94)
MONOCYTES # BLD AUTO: 0.4 X10(3)/MCL (ref 0.1–1.3)
MONOCYTES NFR BLD AUTO: 6 %
NEUTROPHILS # BLD AUTO: 3.51 X10(3)/MCL (ref 2.1–9.2)
NEUTROPHILS NFR BLD AUTO: 56 %
PLATELET # BLD AUTO: 214 X10(3)/MCL (ref 130–400)
PMV BLD AUTO: 9.8 FL (ref 9.4–12.4)
POTASSIUM SERPL-SCNC: 3.3 MMOL/L (ref 3.5–5.1)
RBC # BLD AUTO: 3.9 X10(6)/MCL (ref 4.2–5.4)
SODIUM SERPL-SCNC: 138 MMOL/L (ref 136–145)
WBC # SPEC AUTO: 6.2 X10(3)/MCL (ref 4.5–11.5)

## 2020-05-30 LAB
BUN SERPL-MCNC: 8.2 MG/DL (ref 7–18.7)
CALCIUM SERPL-MCNC: 8 MG/DL (ref 8.4–10.2)
CHLORIDE SERPL-SCNC: 109 MMOL/L (ref 98–107)
CO2 SERPL-SCNC: 22 MMOL/L (ref 22–29)
CREAT SERPL-MCNC: 0.78 MG/DL (ref 0.55–1.02)
CREAT/UREA NIT SERPL: 11
GLUCOSE SERPL-MCNC: 108 MG/DL (ref 74–100)
POTASSIUM SERPL-SCNC: 4.4 MMOL/L (ref 3.5–5.1)
SODIUM SERPL-SCNC: 136 MMOL/L (ref 136–145)

## 2020-06-02 LAB
FINAL CULTURE: NORMAL
FINAL CULTURE: NORMAL

## 2020-09-20 LAB — CRC RECOMMENDATION EXT: NORMAL

## 2020-10-01 ENCOUNTER — HISTORICAL (OUTPATIENT)
Dept: ADMINISTRATIVE | Facility: HOSPITAL | Age: 50
End: 2020-10-01

## 2020-10-01 LAB
ABS NEUT (OLG): 2.56 X10(3)/MCL (ref 2.1–9.2)
ALBUMIN SERPL-MCNC: 2.8 GM/DL (ref 3.4–5)
ALBUMIN/GLOB SERPL: 0.6 RATIO (ref 1.1–2)
ALP SERPL-CCNC: 166 UNIT/L (ref 45–117)
ALT SERPL-CCNC: 21 UNIT/L (ref 12–78)
APPEARANCE, UA: CLEAR
AST SERPL-CCNC: 44 UNIT/L (ref 15–37)
BACTERIA #/AREA URNS AUTO: ABNORMAL /HPF
BASOPHILS NFR BLD MANUAL: 0 %
BILIRUB SERPL-MCNC: 0.2 MG/DL (ref 0.2–1)
BILIRUB UR QL STRIP: NEGATIVE
BILIRUBIN DIRECT+TOT PNL SERPL-MCNC: 0.1 MG/DL
BILIRUBIN DIRECT+TOT PNL SERPL-MCNC: 0.1 MG/DL (ref 0–0.2)
BUN SERPL-MCNC: 14 MG/DL (ref 7–18)
C3 SERPL-MCNC: 61 MG/DL (ref 80–173)
C4 SERPL-MCNC: <2.9 MG/DL (ref 13–46)
CALCIUM SERPL-MCNC: 8.8 MG/DL (ref 8.5–10.1)
CHLORIDE SERPL-SCNC: 111 MMOL/L (ref 98–107)
CHOLEST SERPL-MCNC: 95 MG/DL
CHOLEST/HDLC SERPL: 5.9 {RATIO} (ref 0–4.4)
CO2 SERPL-SCNC: 25 MMOL/L (ref 21–32)
COLOR UR: YELLOW
CREAT SERPL-MCNC: 0.9 MG/DL (ref 0.6–1.3)
CREAT UR-MCNC: 142 MG/DL
CREAT UR-MCNC: 142 MG/DL
DEPRECATED CALCIDIOL+CALCIFEROL SERPL-MC: 10.6 NG/ML (ref 30–80)
EOSINOPHIL NFR BLD MANUAL: 2 %
ERYTHROCYTE [DISTWIDTH] IN BLOOD BY AUTOMATED COUNT: 16.8 % (ref 11.5–14.5)
ERYTHROCYTE [SEDIMENTATION RATE] IN BLOOD: 85 MM/HR (ref 0–20)
EST. AVERAGE GLUCOSE BLD GHB EST-MCNC: 126 MG/DL
GLOBULIN SER-MCNC: 4.8 GM/ML (ref 2.3–3.5)
GLUCOSE (UA): NORMAL
GLUCOSE SERPL-MCNC: 90 MG/DL (ref 74–106)
GRANULOCYTES NFR BLD MANUAL: 41 % (ref 43–75)
HBA1C MFR BLD: 6 % (ref 4.2–6.3)
HCT VFR BLD AUTO: 31.7 % (ref 35–46)
HDLC SERPL-MCNC: 16 MG/DL (ref 40–59)
HGB BLD-MCNC: 9.5 GM/DL (ref 12–16)
HGB UR QL STRIP: 0.5
HYALINE CASTS #/AREA URNS LPF: ABNORMAL /LPF
KETONES UR QL STRIP: NEGATIVE
LDLC SERPL CALC-MCNC: 47 MG/DL
LEUKOCYTE ESTERASE UR QL STRIP: NEGATIVE
LYMPHOCYTES NFR BLD MANUAL: 52 % (ref 20.5–51.1)
MCH RBC QN AUTO: 27.5 PG (ref 26–34)
MCHC RBC AUTO-ENTMCNC: 30 GM/DL (ref 31–37)
MCV RBC AUTO: 91.9 FL (ref 80–100)
MICROALBUMIN UR-MCNC: 221 MG/L (ref 0–19)
MICROALBUMIN/CREAT RATIO PNL UR: 155.6 MCG/MG CR (ref 0–29)
MONOCYTES NFR BLD MANUAL: 5 % (ref 2–9)
NITRITE UR QL STRIP: NEGATIVE
PH UR STRIP: 6 [PH] (ref 4.5–8)
PLATELET # BLD AUTO: 203 X10(3)/MCL (ref 130–400)
PMV BLD AUTO: 10.5 FL (ref 7.4–10.4)
POTASSIUM SERPL-SCNC: 4.1 MMOL/L (ref 3.5–5.1)
PROT SERPL-MCNC: 7.6 GM/DL (ref 6.4–8.2)
PROT UR QL STRIP: 50 MG/DL
PROT UR STRIP-MCNC: 83.4 MG/DL
PROT/CREAT UR-RTO: 587.3 MG/GM
RBC # BLD AUTO: 3.45 X10(6)/MCL (ref 4–5.2)
RBC #/AREA URNS AUTO: ABNORMAL /HPF
SODIUM SERPL-SCNC: 141 MMOL/L (ref 136–145)
SP GR UR STRIP: 1.02 (ref 1–1.03)
SQUAMOUS #/AREA URNS LPF: >100 /LPF
TRIGL SERPL-MCNC: 160 MG/DL
TSH SERPL-ACNC: 1.85 MIU/L (ref 0.36–3.74)
UROBILINOGEN UR STRIP-ACNC: 2 MG/DL
VLDLC SERPL CALC-MCNC: 32 MG/DL
WBC # SPEC AUTO: 5.8 X10(3)/MCL (ref 4.5–11)
WBC #/AREA URNS AUTO: ABNORMAL /HPF

## 2020-10-03 ENCOUNTER — HISTORICAL (OUTPATIENT)
Dept: LAB | Facility: HOSPITAL | Age: 50
End: 2020-10-03

## 2020-10-05 ENCOUNTER — HISTORICAL (OUTPATIENT)
Dept: ADMINISTRATIVE | Facility: HOSPITAL | Age: 50
End: 2020-10-05

## 2020-10-05 LAB — PROT 24H UR-MCNC: 453.9 MG/24HR

## 2020-11-04 ENCOUNTER — HOSPITAL ENCOUNTER (OUTPATIENT)
Dept: MEDSURG UNIT | Facility: HOSPITAL | Age: 50
End: 2020-11-06
Attending: INTERNAL MEDICINE | Admitting: INTERNAL MEDICINE

## 2020-11-04 LAB
ABS NEUT (OLG): 4.93 X10(3)/MCL (ref 2.1–9.2)
ANISOCYTOSIS BLD QL SMEAR: NORMAL
BASOPHILS NFR BLD MANUAL: 0 %
BUN SERPL-MCNC: 14 MG/DL (ref 7–18.7)
CALCIUM SERPL-MCNC: 8.5 MG/DL (ref 8.4–10.2)
CHLORIDE SERPL-SCNC: 109 MMOL/L (ref 98–107)
CO2 SERPL-SCNC: 22 MMOL/L (ref 22–29)
CREAT SERPL-MCNC: 0.82 MG/DL (ref 0.55–1.02)
CREAT/UREA NIT SERPL: 17
CRP SERPL-MCNC: 1.19 MG/DL
EOSINOPHIL NFR BLD MANUAL: 0 %
ERYTHROCYTE [DISTWIDTH] IN BLOOD BY AUTOMATED COUNT: 17.2 % (ref 11.5–14.5)
ERYTHROCYTE [SEDIMENTATION RATE] IN BLOOD: 75 MM/HR (ref 0–20)
GLUCOSE SERPL-MCNC: 92 MG/DL (ref 74–100)
GRANULOCYTES NFR BLD MANUAL: 57 % (ref 43–75)
HCT VFR BLD AUTO: 30.4 % (ref 35–46)
HGB BLD-MCNC: 9.1 GM/DL (ref 12–16)
HYPOCHROMIA BLD QL SMEAR: NORMAL
LACTATE SERPL-SCNC: 1.4 MMOL/L (ref 0.5–2.2)
LYMPHOCYTES NFR BLD MANUAL: 39 % (ref 20.5–51.1)
MCH RBC QN AUTO: 27 PG (ref 26–34)
MCHC RBC AUTO-ENTMCNC: 29.9 GM/DL (ref 31–37)
MCV RBC AUTO: 90.2 FL (ref 80–100)
MONOCYTES NFR BLD MANUAL: 4 % (ref 2–9)
PLATELET # BLD AUTO: 245 X10(3)/MCL (ref 130–400)
PLATELET # BLD EST: ADEQUATE 10*3/UL
PMV BLD AUTO: 9.8 FL (ref 7.4–10.4)
POLYCHROMASIA BLD QL SMEAR: NORMAL
POTASSIUM SERPL-SCNC: 4 MMOL/L (ref 3.5–5.1)
RBC # BLD AUTO: 3.37 X10(6)/MCL (ref 4–5.2)
RBC MORPH BLD: NORMAL
SARS-COV-2 AG RESP QL IA.RAPID: NEGATIVE
SODIUM SERPL-SCNC: 140 MMOL/L (ref 136–145)
WBC # SPEC AUTO: 9.3 X10(3)/MCL (ref 4.5–11)

## 2020-11-05 LAB
ABS NEUT (OLG): 3.31 X10(3)/MCL (ref 2.1–9.2)
ALBUMIN SERPL-MCNC: 2.7 GM/DL (ref 3.5–5)
ALBUMIN/GLOB SERPL: 0.8 RATIO (ref 1.1–2)
ALP SERPL-CCNC: 167 UNIT/L (ref 40–150)
ALT SERPL-CCNC: 34 UNIT/L (ref 0–55)
AMPHET UR QL SCN: NEGATIVE
ANISOCYTOSIS BLD QL SMEAR: NORMAL
APPEARANCE, UA: CLEAR
AST SERPL-CCNC: 54 UNIT/L (ref 5–34)
BACTERIA #/AREA URNS AUTO: ABNORMAL /HPF
BARBITURATE SCN PRESENT UR: NEGATIVE
BASOPHILS NFR BLD MANUAL: 1 %
BENZODIAZ UR QL SCN: NEGATIVE
BILIRUB SERPL-MCNC: 0.4 MG/DL
BILIRUB UR QL STRIP: NEGATIVE
BILIRUBIN DIRECT+TOT PNL SERPL-MCNC: 0.1 MG/DL (ref 0–0.8)
BILIRUBIN DIRECT+TOT PNL SERPL-MCNC: 0.3 MG/DL (ref 0–0.5)
BUN SERPL-MCNC: 12 MG/DL (ref 7–18.7)
CALCIUM SERPL-MCNC: 8.3 MG/DL (ref 8.4–10.2)
CANNABINOIDS UR QL SCN: NEGATIVE
CHLORIDE SERPL-SCNC: 112 MMOL/L (ref 98–107)
CO2 SERPL-SCNC: 22 MMOL/L (ref 22–29)
COCAINE UR QL SCN: NEGATIVE
COLOR UR: YELLOW
CREAT SERPL-MCNC: 0.69 MG/DL (ref 0.55–1.02)
CRP SERPL-MCNC: 0.96 MG/DL
DSDNA ANTIBODY (OHS): NEGATIVE
EOSINOPHIL NFR BLD MANUAL: 1 %
ERYTHROCYTE [DISTWIDTH] IN BLOOD BY AUTOMATED COUNT: 17.2 % (ref 11.5–14.5)
GLOBULIN SER-MCNC: 3.6 GM/DL (ref 2.4–3.5)
GLUCOSE (UA): NEGATIVE
GLUCOSE SERPL-MCNC: 85 MG/DL (ref 74–100)
GRANULOCYTES NFR BLD MANUAL: 53 % (ref 43–75)
HCT VFR BLD AUTO: 29.1 % (ref 35–46)
HGB BLD-MCNC: 8.7 GM/DL (ref 12–16)
HGB UR QL STRIP: 0.5
HYALINE CASTS #/AREA URNS LPF: ABNORMAL /LPF
HYPOCHROMIA BLD QL SMEAR: NORMAL
KETONES UR QL STRIP: NEGATIVE
LEUKOCYTE ESTERASE UR QL STRIP: NEGATIVE
LYMPHOCYTES NFR BLD MANUAL: 38 % (ref 20.5–51.1)
MAGNESIUM SERPL-MCNC: 1.76 MG/DL (ref 1.6–2.6)
MCH RBC QN AUTO: 27.4 PG (ref 26–34)
MCHC RBC AUTO-ENTMCNC: 29.9 GM/DL (ref 31–37)
MCV RBC AUTO: 91.5 FL (ref 80–100)
MONOCYTES NFR BLD MANUAL: 7 % (ref 2–9)
NITRITE UR QL STRIP: NEGATIVE
OPIATES UR QL SCN: POSITIVE
PCP UR QL: NEGATIVE
PH UR STRIP.AUTO: 6.5 [PH] (ref 5–7.5)
PH UR STRIP: 6 [PH] (ref 4.5–8)
PHOSPHATE SERPL-MCNC: 4.5 MG/DL (ref 2.3–4.7)
PLATELET # BLD AUTO: 234 X10(3)/MCL (ref 130–400)
PLATELET # BLD EST: ADEQUATE 10*3/UL
PMV BLD AUTO: 10.4 FL (ref 7.4–10.4)
POIKILOCYTOSIS BLD QL SMEAR: NORMAL
POLYCHROMASIA BLD QL SMEAR: NORMAL
POTASSIUM SERPL-SCNC: 3.7 MMOL/L (ref 3.5–5.1)
PROT SERPL-MCNC: 6.3 GM/DL (ref 6.4–8.3)
PROT UR QL STRIP: 50 MG/DL
RBC # BLD AUTO: 3.18 X10(6)/MCL (ref 4–5.2)
RBC #/AREA URNS AUTO: ABNORMAL /HPF
SODIUM SERPL-SCNC: 140 MMOL/L (ref 136–145)
SP GR UR STRIP: 1.02 (ref 1–1.03)
SQUAMOUS #/AREA URNS LPF: ABNORMAL /LPF
TEMPERATURE, URINE (OHS): 23 DEGC (ref 20–25)
UROBILINOGEN UR STRIP-ACNC: NORMAL
WBC # SPEC AUTO: 6.7 X10(3)/MCL (ref 4.5–11)
WBC #/AREA URNS AUTO: ABNORMAL /HPF

## 2020-11-06 LAB
ABS NEUT (OLG): 3.99 X10(3)/MCL (ref 2.1–9.2)
ANISOCYTOSIS BLD QL SMEAR: NORMAL
BASOPHILS NFR BLD MANUAL: 0 %
EOSINOPHIL NFR BLD MANUAL: 1 %
ERYTHROCYTE [DISTWIDTH] IN BLOOD BY AUTOMATED COUNT: 17.5 % (ref 11.5–14.5)
GRANULOCYTES NFR BLD MANUAL: 61 % (ref 43–75)
HCT VFR BLD AUTO: 31.1 % (ref 35–46)
HGB BLD-MCNC: 9.2 GM/DL (ref 12–16)
HYPOCHROMIA BLD QL SMEAR: NORMAL
LYMPHOCYTES NFR BLD MANUAL: 34 % (ref 20.5–51.1)
MCH RBC QN AUTO: 27.3 PG (ref 26–34)
MCHC RBC AUTO-ENTMCNC: 29.6 GM/DL (ref 31–37)
MCV RBC AUTO: 92.3 FL (ref 80–100)
MONOCYTES NFR BLD MANUAL: 4 % (ref 2–9)
PLATELET # BLD AUTO: 229 X10(3)/MCL (ref 130–400)
PLATELET # BLD EST: ADEQUATE 10*3/UL
PMV BLD AUTO: 10.4 FL (ref 7.4–10.4)
POIKILOCYTOSIS BLD QL SMEAR: NORMAL
RBC # BLD AUTO: 3.37 X10(6)/MCL (ref 4–5.2)
SCHISTOCYTES BLD QL AUTO: NORMAL
WBC # SPEC AUTO: 7.3 X10(3)/MCL (ref 4.5–11)

## 2020-11-10 LAB
FINAL CULTURE: NORMAL
FINAL CULTURE: NORMAL

## 2021-05-17 ENCOUNTER — HISTORICAL (OUTPATIENT)
Dept: ADMINISTRATIVE | Facility: HOSPITAL | Age: 51
End: 2021-05-17

## 2021-05-17 LAB
ABS NEUT (OLG): 6.54 X10(3)/MCL (ref 2.1–9.2)
ALBUMIN SERPL-MCNC: 3.8 GM/DL (ref 3.5–5)
ALBUMIN/GLOB SERPL: 0.9 RATIO (ref 1.1–2)
ALP SERPL-CCNC: 166 UNIT/L (ref 40–150)
ALT SERPL-CCNC: 31 UNIT/L (ref 0–55)
AST SERPL-CCNC: 35 UNIT/L (ref 5–34)
BASOPHILS # BLD AUTO: 0.1 X10(3)/MCL (ref 0–0.2)
BASOPHILS NFR BLD AUTO: 1 %
BILIRUB SERPL-MCNC: 0.3 MG/DL
BILIRUBIN DIRECT+TOT PNL SERPL-MCNC: 0.1 MG/DL (ref 0–0.8)
BILIRUBIN DIRECT+TOT PNL SERPL-MCNC: 0.2 MG/DL (ref 0–0.5)
BUN SERPL-MCNC: 18.5 MG/DL (ref 9.8–20.1)
C3 SERPL-MCNC: 102 MG/DL (ref 80–173)
C4 SERPL-MCNC: 4.6 MG/DL (ref 13–46)
CALCIUM SERPL-MCNC: 10.3 MG/DL (ref 8.4–10.2)
CHLORIDE SERPL-SCNC: 108 MMOL/L (ref 98–107)
CO2 SERPL-SCNC: 21 MMOL/L (ref 22–29)
CREAT SERPL-MCNC: 0.81 MG/DL (ref 0.55–1.02)
EOSINOPHIL # BLD AUTO: 0.2 X10(3)/MCL (ref 0–0.9)
EOSINOPHIL NFR BLD AUTO: 3 %
ERYTHROCYTE [DISTWIDTH] IN BLOOD BY AUTOMATED COUNT: 17.6 % (ref 11.5–14.5)
ERYTHROCYTE [SEDIMENTATION RATE] IN BLOOD: 69 MM/HR (ref 0–20)
GLOBULIN SER-MCNC: 4.2 GM/DL (ref 2.4–3.5)
GLUCOSE SERPL-MCNC: 104 MG/DL (ref 74–100)
HCT VFR BLD AUTO: 46.1 % (ref 35–46)
HGB BLD-MCNC: 14.6 GM/DL (ref 12–16)
IMM GRANULOCYTES # BLD AUTO: 0.04 10*3/UL
IMM GRANULOCYTES NFR BLD AUTO: 0 %
LYMPHOCYTES # BLD AUTO: 2.1 X10(3)/MCL (ref 0.6–4.6)
LYMPHOCYTES NFR BLD AUTO: 22 %
MCH RBC QN AUTO: 29.3 PG (ref 26–34)
MCHC RBC AUTO-ENTMCNC: 31.7 GM/DL (ref 31–37)
MCV RBC AUTO: 92.6 FL (ref 80–100)
MONOCYTES # BLD AUTO: 0.3 X10(3)/MCL (ref 0.1–1.3)
MONOCYTES NFR BLD AUTO: 4 %
NEUTROPHILS # BLD AUTO: 6.54 X10(3)/MCL (ref 2.1–9.2)
NEUTROPHILS NFR BLD AUTO: 70 %
PLATELET # BLD AUTO: 263 X10(3)/MCL (ref 130–400)
PMV BLD AUTO: 9.6 FL (ref 7.4–10.4)
POTASSIUM SERPL-SCNC: 4.4 MMOL/L (ref 3.5–5.1)
PROT SERPL-MCNC: 8 GM/DL (ref 6.4–8.3)
RBC # BLD AUTO: 4.98 X10(6)/MCL (ref 4–5.2)
SODIUM SERPL-SCNC: 139 MMOL/L (ref 136–145)
WBC # SPEC AUTO: 9.3 X10(3)/MCL (ref 4.5–11)

## 2021-06-23 ENCOUNTER — HOSPITAL ENCOUNTER (OUTPATIENT)
Dept: MEDSURG UNIT | Facility: HOSPITAL | Age: 51
End: 2021-06-24
Attending: INTERNAL MEDICINE | Admitting: INTERNAL MEDICINE

## 2021-06-24 LAB
ABS NEUT (OLG): 10 X10(3)/MCL (ref 2.1–9.2)
ALBUMIN SERPL-MCNC: 2.6 GM/DL (ref 3.5–5)
ALBUMIN/GLOB SERPL: 0.9 RATIO (ref 1.1–2)
ALP SERPL-CCNC: 142 UNIT/L (ref 40–150)
ALT SERPL-CCNC: 34 UNIT/L (ref 0–55)
AST SERPL-CCNC: 39 UNIT/L (ref 5–34)
BILIRUB SERPL-MCNC: 0.4 MG/DL
BILIRUBIN DIRECT+TOT PNL SERPL-MCNC: 0.2 MG/DL (ref 0–0.5)
BILIRUBIN DIRECT+TOT PNL SERPL-MCNC: 0.2 MG/DL (ref 0–0.8)
BUN SERPL-MCNC: 16.4 MG/DL (ref 9.8–20.1)
CALCIUM SERPL-MCNC: 8.5 MG/DL (ref 8.4–10.2)
CHLORIDE SERPL-SCNC: 109 MMOL/L (ref 98–107)
CO2 SERPL-SCNC: 22 MMOL/L (ref 22–29)
CREAT SERPL-MCNC: 0.76 MG/DL (ref 0.55–1.02)
EOSINOPHIL # BLD AUTO: 0.1 X10(3)/MCL (ref 0–0.9)
EOSINOPHIL NFR BLD AUTO: 1 %
ERYTHROCYTE [DISTWIDTH] IN BLOOD BY AUTOMATED COUNT: 16.2 % (ref 11.5–17)
GLOBULIN SER-MCNC: 3 GM/DL (ref 2.4–3.5)
GLUCOSE SERPL-MCNC: 109 MG/DL (ref 74–100)
HCT VFR BLD AUTO: 41.6 % (ref 37–47)
HGB BLD-MCNC: 13.6 GM/DL (ref 12–16)
LYMPHOCYTES # BLD AUTO: 0.5 X10(3)/MCL (ref 0.6–4.6)
LYMPHOCYTES NFR BLD AUTO: 5 %
MCH RBC QN AUTO: 30.4 PG (ref 27–31)
MCHC RBC AUTO-ENTMCNC: 32.7 GM/DL (ref 33–36)
MCV RBC AUTO: 93.1 FL (ref 80–94)
MONOCYTES # BLD AUTO: 0.3 X10(3)/MCL (ref 0.1–1.3)
MONOCYTES NFR BLD AUTO: 3 %
NEUTROPHILS # BLD AUTO: 10 X10(3)/MCL (ref 2.1–9.2)
NEUTROPHILS NFR BLD AUTO: 91 %
PLATELET # BLD AUTO: 295 X10(3)/MCL (ref 130–400)
PMV BLD AUTO: 9.8 FL (ref 9.4–12.4)
POTASSIUM SERPL-SCNC: 4 MMOL/L (ref 3.5–5.1)
PROT SERPL-MCNC: 5.6 GM/DL (ref 6.4–8.3)
RBC # BLD AUTO: 4.47 X10(6)/MCL (ref 4.2–5.4)
SODIUM SERPL-SCNC: 141 MMOL/L (ref 136–145)
WBC # SPEC AUTO: 10.9 X10(3)/MCL (ref 4.5–11.5)

## 2021-08-24 ENCOUNTER — HISTORICAL (OUTPATIENT)
Dept: ADMINISTRATIVE | Facility: HOSPITAL | Age: 51
End: 2021-08-24

## 2021-08-24 LAB
AFP-TM SERPL-MCNC: <2 NG/ML
ALBUMIN SERPL-MCNC: 3.5 GM/DL (ref 3.5–5)
ALP SERPL-CCNC: 142 UNIT/L (ref 40–150)
ALT SERPL-CCNC: 36 UNIT/L (ref 0–55)
APPEARANCE, UA: CLEAR
AST SERPL-CCNC: 44 UNIT/L (ref 5–34)
BACTERIA #/AREA URNS AUTO: ABNORMAL /HPF
BILIRUB SERPL-MCNC: 0.6 MG/DL
BILIRUB UR QL STRIP: NEGATIVE
BILIRUBIN DIRECT+TOT PNL SERPL-MCNC: 0.3 MG/DL (ref 0–0.5)
BILIRUBIN DIRECT+TOT PNL SERPL-MCNC: 0.3 MG/DL (ref 0–0.8)
C3 SERPL-MCNC: 101 MG/DL (ref 80–173)
C4 SERPL-MCNC: 8.4 MG/DL (ref 13–46)
COLOR UR: YELLOW
CREAT UR-MCNC: 153.7 MG/DL (ref 45–106)
DEPRECATED CALCIDIOL+CALCIFEROL SERPL-MC: 12 NG/ML (ref 30–80)
ERYTHROCYTE [SEDIMENTATION RATE] IN BLOOD: 48 MM/HR (ref 0–20)
GGT SERPL-CCNC: 162 U/L (ref 9–36)
GLUCOSE (UA): NEGATIVE
HBV CORE AB SERPL QL IA: NONREACTIVE
HBV CORE IGM SERPL QL IA: NONREACTIVE
HBV SURFACE AG SERPL QL IA: NONREACTIVE
HCV AB SERPL QL IA: NONREACTIVE
HGB UR QL STRIP: 1 MG/DL
HYALINE CASTS #/AREA URNS LPF: ABNORMAL /LPF
INR PPP: 1.21 (ref 0.9–1.2)
KETONES UR QL STRIP: NEGATIVE
LEUKOCYTE ESTERASE UR QL STRIP: NEGATIVE
NITRITE UR QL STRIP: NEGATIVE
PH UR STRIP: 6 [PH] (ref 4.5–8)
PROT SERPL-MCNC: 7.4 GM/DL (ref 6.4–8.3)
PROT UR QL STRIP: 200 MG/DL
PROT UR STRIP-MCNC: 248.5 MG/DL
PROT/CREAT UR-RTO: 1616.8 MG/GM CR
PROTHROMBIN TIME: 15.1 SECOND(S) (ref 11.9–14.4)
PTH-INTACT SERPL-MCNC: 66.9 PG/ML (ref 8.7–77)
RBC #/AREA URNS AUTO: ABNORMAL /HPF
SP GR UR STRIP: 1.01 (ref 1–1.03)
SQUAMOUS #/AREA URNS LPF: ABNORMAL /LPF
TSH SERPL-ACNC: 2.52 UIU/ML (ref 0.35–4.94)
UROBILINOGEN UR STRIP-ACNC: NORMAL
WBC #/AREA URNS AUTO: ABNORMAL /HPF

## 2021-09-17 ENCOUNTER — HISTORICAL (OUTPATIENT)
Dept: RADIOLOGY | Facility: HOSPITAL | Age: 51
End: 2021-09-17

## 2022-04-11 ENCOUNTER — HISTORICAL (OUTPATIENT)
Dept: ADMINISTRATIVE | Facility: HOSPITAL | Age: 52
End: 2022-04-11
Payer: MEDICARE

## 2022-04-19 ENCOUNTER — HISTORICAL (OUTPATIENT)
Dept: ADMINISTRATIVE | Facility: HOSPITAL | Age: 52
End: 2022-04-19
Payer: MEDICARE

## 2022-04-24 VITALS
WEIGHT: 240.31 LBS | BODY MASS INDEX: 35.59 KG/M2 | DIASTOLIC BLOOD PRESSURE: 76 MMHG | HEIGHT: 69 IN | SYSTOLIC BLOOD PRESSURE: 119 MMHG | OXYGEN SATURATION: 96 %

## 2022-04-30 NOTE — OP NOTE
DATE OF SURGERY:    08/03/2018    SURGEON:  Francisco Espinoza MD    PREOPERATIVE DIAGNOSES:    1. Superior mesenteric artery occlusion.    2. Systemic lupus erythematosus.  3. Peripheral vascular disease.  4. Presence of lupus anticoagulant.    POSTOPERATIVE DIAGNOSES:    1. Superior mesenteric artery occlusion.    2. Systemic lupus erythematosus.  3. Peripheral vascular disease.  4. Presence of lupus anticoagulant.    PROCEDURE PERFORMED:    1. Percutaneous transluminal angioplasty and stent of superior mesenteric artery with 7 x 100 EverFlex stent via direct left brachial artery cutdown.  2. Flush aortogram.  3. Selective superior mesenteric artery angiogram.    4. Primary repair of the left brachial artery.    ASSISTANT:  Saul Saleh PA-C.    HISTORY:  The patient is a 48-year-old female who has been dealing with the cutaneous and vascular complications of lupus.  She has had previous bilateral femoral popliteal bypasses.  The left is patent.  The right has occluded, but she has adequate collateral.  Over the past several months, the patient has had severe postprandial nausea and abdominal pain to where she could not keep any food down, and she has lost 100 pounds, and a CT angiogram was performed that showed a high-grade mid superior mesenteric artery stenosis or occlusion.  Because the patient has a lupus anticoagulant, we are going to approach this via left brachial artery approach with an open cutdown and direct primary repair of the artery, to avoid any potential vascular compromise to the left arm.    PROCEDURE IN DETAIL:  The patient was brought to the operating room and after placement of the appropriate monitoring lines, was anesthetized with general endotracheal anesthesia.  She was placed in the supine position with the left side gently elevated.  The left upper arm was prepped and draped in a sterile field.  A vertical incision overlying the medial edge of the biceps was made and  carried down through soft tissues.  The brachial artery was identified and isolated.  The patient was then systemically heparinized.  Then using direct visualization for micropuncture technique, the artery was accessed, and a 0.035 Glidewire was directed into the upper abdominal aorta.  A pigtail catheter was then directed to the site and a flush aortogram was performed, which showed the origins of the superior mesenteric and celiac arteries, and definite occlusion of the mid superior mesenteric.  At this point, a long 6 mm sheath was then directed down to and into the origin of the superior mesenteric, and an angiographic catheter was then directed into the artery for support.  The 0.035 Glidewire was carefully passed, and eventually through the lesion into the distal superior mesenteric, and this was confirmed angiographically once a Quick Cross catheter was placed into the distal artery.  Flush angiogram was performed with simultaneous injections from the sheath and a Quick Cross catheter documenting the length of the stent needed, and that we were definitely in the lumen of the distal artery.  A 7 x 100 mm stent was chosen and was deployed satisfactorily, and then post dilated with a 6 x 60 balloon.  Completion angiogram in the superior mesenteric showed excellent flow into the distal vasculature with several branches being perfused through the interstices of the graft and then a completion flush aortogram was performed to document patency of the origin of the superior mesenteric artery.  At this point, the long sheath was removed.  Proximal and distal control was obtained on the brachial artery and 6-0 Prolene was used to close the artery primarily.  Hemostasis at the site was perfect.  Heparin was not reversed.  The soft tissues were closed in layers with Vicryl.  The skin was closed with a 3-0 Monocryl subcuticular stitch.  A sterile dressing was placed, and the patient was awakened and transported to the  recovery room.        ______________________________  MD MAUDE Chaidez  DD:  08/03/2018  Time:  12:49PM  DT:  08/03/2018  Time:  01:07PM  Job #:  [id number]

## 2022-04-30 NOTE — OP NOTE
Patient:   Vanesa Newsome            MRN: [id number]            FIN: [id number]               Age:   48 years     Sex:  Female     :  1970   Associated Diagnoses:   None   Author:   Francisco Espinoza MD      Brief Operative Note   Operative Information   Date/ Time:  8/3/2018 12:36:00.     Preoperative Diagnosis: SMA occlusion, SLE.     Postoperative Diagnosis: same.     Procedures Performed: PTA/ stent of SMA w/ 7x100 Everflex stent via Lt brachial artery cutdown, flush aortagram, selective SMA angiogram, primary repair of LBA.     Surgeon: Francisco Espinoza MD.     Assistant: Saul Saleh PA-C, J Taylor CRT.     Esimated blood loss: loss  20  cc.

## 2022-04-30 NOTE — ED PROVIDER NOTES
Patient:   Vanesa Newsome            MRN: [id number]            FIN: [id number]               Age:   49 years     Sex:  Female     :  1970   Associated Diagnoses:   Vascular occlusion; Cellulitis   Author:   Mackenzie ESCUDERO, Edwar Juares      Basic Information   Time seen: Date & time 2019 13:17:00.   History source: Patient.   Arrival mode: Private vehicle.   History limitation: None.   Additional information: Patient's physician(s): Annabella ESCUDERO, Etelvina FORREST      History of Present Illness   The patient presents with 48 y/o cf presents with left leg pain and redness that started just this AM.  Patient also reports intermittent diarrhea for past 10 days.  EDISON Rivera PA-C and       I, Dr. Mann, assumed care of this patient upon walking into the room at 1842.  48 y/o CF with h/o GERD, PVD and COPD on Effient and Xarelto presents to the ED c/o LLE pain with erythema that began upon waking this AM. Pt notes she is unable to stand or bear weight on this leg. She is scheduled to have a procedure on 19 with Dr. Villa for likely placement of stents. She rates pain as 7/10. She reports that at baseline she is having worsening pain in b/l lower extremities. She has been having diarrhea for past 10 days. .  The onset was 19.  The course/duration of symptoms is constant.  Type of injury: none.  Location: Left leg. The character of symptoms is pain, swelling and redness.  The degree at present is moderate.  There are exacerbating factors including weight bearing and walking.  The relieving factor is none.  Risk factors consist of anticoagulated, GERD and COPD, PVD.  Prior episodes: none.  Therapy today: none.  Associated symptoms: rash.        Review of Systems   Constitutional symptoms:  No fever, no chills, no sweats, no weakness, no fatigue.    Skin symptoms:  No rash, no lesion.    Respiratory symptoms:  No shortness of breath, no cough, no hemoptysis.    Cardiovascular symptoms:  No  chest pain, no palpitations, no syncope, no peripheral edema.    Gastrointestinal symptoms:  Diarrhea, no abdominal pain, no nausea, no vomiting.    Genitourinary symptoms:  No dysuria, no hematuria, no vaginal bleeding, no vaginal discharge.    Musculoskeletal symptoms:  LLE pain and redness, No back pain,    Neurologic symptoms:  No headache, no dizziness, no altered level of consciousness, no numbness, no tingling, no weakness.              Additional review of systems information: All other systems reviewed and otherwise negative.      Health Status   Allergies:    Allergic Reactions (Selected)  Severity Not Documented  Vancomycin- Hives and blotchy..   Medications:  (Selected)   Inpatient Medications  Ordered  Zofran 2 mg/mL injectable solution: 4 mg, form: Injection, IV Push, Once, first dose 18 18:00:00 CDT, stop date 18 18:00:00 CDT  aspirin 81 mg oral tablet, CHEWABLE: 324 mg, form: Tab-Chew, Oral, Once, first dose 18 18:21:00 CDT, stop date 18 18:21:00 CDT, 4 chew tab = 5 grain ASA  Prescriptions  Prescribed  Effient 10 mg oral tablet: 10 mg = 1 tab(s), Oral, Daily, # 30 tab(s), 1 Refill(s), Pharmacy: [id number]  Protonix 20 mg ORAL enteric coated tablet: 20 mg = 1 tab(s), Oral, Daily, # 30 tab(s), 3 Refill(s), Pharmacy: [id number]  Xarelto 20mg Tablet: 20 mg = 1 tab(s), Oral, With Supper, # 30 tab(s), 3 Refill(s)  Zofran ODT 4 mg oral tablet, disintegratin-2 tab(s), Oral, q4hr, # 12 tab(s), 0 Refill(s)  Zoloft 100 mg oral tablet: 100 mg = 1 tab(s), Oral, Daily, # 30 tab(s), 2 Refill(s), Pharmacy: [id number]  albuterol 0.083% inhalation solution: 2.5 mg = 3 mL, NEB, q4hr Resp, PRN PRN wheezing, # 300 mL, 4 Refill(s), Pharmacy: [id number]  folic acid 1 mg oral tablet: 1 mg = 1 tab(s), Oral, Daily, # 90 tab(s), 3 Refill(s), Pharmacy: [id number]  levothyroxine 50 mcg (0.05 mg) oral tablet: 50 mcg = 1 tab(s), Oral, Daily, # 30  tab(s), 2 Refill(s), Pharmacy: [id number]  mupirocin 2% topical oint: See Instructions, TOP BID, # 22 gm, 1 Refill(s), Pharmacy: [id number]  prednisONE 20 mg oral tablet: 20 mg = 1 tab(s), Oral, BID, # 60 tab(s), 5 Refill(s), Pharmacy: [id number]  Documented Medications  Documented  Norvasc 5 mg oral tablet: 5 mg = 1 tab(s), Oral, Daily, 0 Refill(s)  gabapentin 100 mg oral capsule: 300 mg = 3 cap(s), Oral, At Bedtime, # 90 cap(s), 0 Refill(s).      Past Medical/ Family/ Social History   Medical history:    Active  COPD (chronic obstructive pulmonary disease) ([id number])  Comments:  8/8/2015 CDT 15:22 CDT - SYSTEM  Problem automatically updated based on documentation on Capillary Refills, Brachial Pulses, Radial Pulses, Femoral Pulses, Dorsalis Pulses, Ulnar pulses, Popliteal Pulses, Postibial Pulses, Edemas, Affect/Behavior, Orientation Assessment, Arterial Line Site.  PVD (peripheral vascular disease) ([id number])  Comments:  8/8/2015 CDT 15:22 CDT - SYSTEM  Problem automatically updated based on documentation on Capillary Refills, Brachial Pulses, Radial Pulses, Femoral Pulses, Dorsalis Pulses, Ulnar pulses, Popliteal Pulses, Postibial Pulses, Edemas, Affect/Behavior, Orientation Assessment, Arterial Line Site.  Resolved  HTN (401.9):  Resolved on 8/8/2015 at 45 years.  Comments:  8/8/2015 CDT 15:22 CDT - SYSTEM  Problem automatically updated based on documentation on Capillary Refills, Brachial Pulses, Radial Pulses, Femoral Pulses, Dorsalis Pulses, Ulnar pulses, Popliteal Pulses, Postibial Pulses, Edemas, Affect/Behavior, Orientation Assessment, Arterial Line Site.  PAD (peripheral artery disease) ([id number]):  Resolved on 8/8/2015 at 45 years.  Comments:  8/8/2015 CDT 15:22 CDT - SYSTEM  Problem automatically updated based on documentation on Capillary Refills, Brachial Pulses, Radial Pulses, Femoral Pulses, Dorsalis Pulses, Ulnar pulses,  Popliteal Pulses, Postibial Pulses, Edemas, Affect/Behavior, Orientation Assessment, Arterial Line Site.  Rheumatoid Arthritis ([id number]):  Resolved on 8/8/2015 at 45 years.  Comments:  8/8/2015 CDT 15:22 CDT - SYSTEM  Problem automatically updated based on documentation on Capillary Refills, Brachial Pulses, Radial Pulses, Femoral Pulses, Dorsalis Pulses, Ulnar pulses, Popliteal Pulses, Postibial Pulses, Edemas, Affect/Behavior, Orientation Assessment, Arterial Line Site.  Migraines ([id number]):  Resolved on 8/8/2015 at 45 years.  Comments:  8/8/2015 CDT 15:22 CDT - SYSTEM  Problem automatically updated based on documentation on Capillary Refills, Brachial Pulses, Radial Pulses, Femoral Pulses, Dorsalis Pulses, Ulnar pulses, Popliteal Pulses, Postibial Pulses, Edemas, Affect/Behavior, Orientation Assessment, Arterial Line Site.  Seizures ([id number]):  Resolved on 8/8/2015 at 45 years.  Comments:  9/22/2013 CDT 23:07 CDT - Philip DEVI, Domingo KENNEDY.  in her youth    8/8/2015 CDT 15:22 CDT - SYSTEM  Problem automatically updated based on documentation on Capillary Refills, Brachial Pulses, Radial Pulses, Femoral Pulses, Dorsalis Pulses, Ulnar pulses, Popliteal Pulses, Postibial Pulses, Edemas, Affect/Behavior, Orientation Assessment, Arterial Line Site.  Depression ([id number]):  Resolved on 8/8/2015 at 45 years.  Comments:  8/8/2015 CDT 15:22 CDT - SYSTEM  Problem automatically updated based on documentation on Capillary Refills, Brachial Pulses, Radial Pulses, Femoral Pulses, Dorsalis Pulses, Ulnar pulses, Popliteal Pulses, Postibial Pulses, Edemas, Affect/Behavior, Orientation Assessment, Arterial Line Site.  Panic Attacks ([id number]):  Resolved on 8/8/2015 at 45 years.  Comments:  8/8/2015 CDT 15:22 CDT - SYSTEM  Problem automatically updated based on documentation on Capillary Refills, Brachial Pulses, Radial Pulses, Femoral Pulses, Dorsalis Pulses, Ulnar pulses, Popliteal Pulses, Postibial Pulses, Edemas,  Affect/Behavior, Orientation Assessment, Arterial Line Site.  COPD ([id number]):  Resolved.  Hypothyroid ([id number]):  Resolved on 8/8/2015 at 45 years.  Comments:  8/8/2015 CDT 15:22 CDT - SYSTEM  Problem automatically updated based on documentation on Capillary Refills, Brachial Pulses, Radial Pulses, Femoral Pulses, Dorsalis Pulses, Ulnar pulses, Popliteal Pulses, Postibial Pulses, Edemas, Affect/Behavior, Orientation Assessment, Arterial Line Site.  Neuropathy ([id number]):  Resolved on 8/8/2015 at 45 years.  Comments:  8/8/2015 CDT 15:22 CDT - SYSTEM  Problem automatically updated based on documentation on Capillary Refills, Brachial Pulses, Radial Pulses, Femoral Pulses, Dorsalis Pulses, Ulnar pulses, Popliteal Pulses, Postibial Pulses, Edemas, Affect/Behavior, Orientation Assessment, Arterial Line Site.  Lupus ([id number]):  Resolved.  Atherosclerosis (64754203):  Resolved.  GERD - Gastro-esophageal reflux disease ([id number]):  Resolved.  Anxiety (36924012):  Resolved.  CHF - Congestive heart failure ([id number]):  Resolved..   Surgical history:    Percutaneous Transluminal Angioplasty (.) on 8/3/2018 at 48 Years.  Comments:  8/3/2018 12:45 - Clarice Terrazas RN  auto-populated from documented surgical case  Right Leg Stents on 1/16/2018 at 47 Years.  Incision & Drainage Lower Extremity (Right) on 1/9/2017 at 46 Years.  Comments:  1/9/2017 18:26 - John Avila RN  auto-populated from documented surgical case  WOUND VAC PLACED in the month of 1/2017 at 46 Years.  Comments:  2/1/2017 10:02 - Dayna Jones DR  Bypass Femoral Popliteal (Right) on 11/7/2016 at 46 Years.  Comments:  11/7/2016 11:53 - Dasha Eastman RN  auto-populated from documented surgical case  Amputation Toe on 2/23/2016 at 45 Years.  Comments:  2/23/2016 09:25 - Remberto Heart CRNA  auto-populated from documented surgical case  Amputation of toe ([id number]) on 2/23/2016 at 45  Years.  Comments:  4/26/2016 13:57 - Dayna Jones  RIGHT SECOND TOE  Bronchoscopy Fluoroscopy (.) on 9/23/2015 at 45 Years.  Comments:  9/23/2015 14:52 - Radu Phillip RRT  auto-populated from documented surgical case  Bronchoscopy w/ Alveolar Lavage (.) on 9/23/2015 at 45 Years.  Comments:  9/23/2015 14:52 - Radu Phillip RRT  auto-populated from documented surgical case  Bronchoscopy w/ Endobronch Biopsy(s) (.) on 9/23/2015 at 45 Years.  Comments:  9/23/2015 14:52 - Radu Phillip RRT  auto-populated from documented surgical case  Incision & Drainage Lower Extremity (Right) on 8/14/2015 at 45 Years.  Comments:  8/14/2015 14:24 - Rafaela Corey RN  auto-populated from documented surgical case  Bypass Femoral Popliteal (Right) on 7/28/2015 at 45 Years.  Comments:  7/28/2015 12:45 - Dasha Eastman RN  auto-populated from documented surgical case  Hysterectomy ([id number]) on 7/1/2013 at 43 Years.  Comments:  11/7/2015 13:50 - Monie Mo.  tumor removed  Appendectomy; (67054).  Cholecystectomy; (93988).  Tonsillectomy ([id number]).  Ovarian Tumor Removal.  Comments:  9/22/2013 23:09 - Philip DEVI, Domingo KENNEDY.  Path results - Benign  Bilateral Great Toe Amputation.  Left Fem-Pop Bypass.  Spleenectomy..   Family history:    Multiple sclerosis  Mother  Colon cancer  Grandfather  .   Social history: Alcohol use: Occasionally, Tobacco use: Denies, Drug use: Marijuana.      Physical Examination               Vital Signs      Vital Signs (last 24 hrs)_____  Last Charted___________  Temp Oral     36.7 DegC  (APR 17 13:16)  Heart Rate Peripheral   73 bpm  (APR 17 19:14)  Resp Rate         18 br/min  (APR 17 19:14)  SBP      H 141mmHg  (APR 17 19:14)  DBP      H 93mmHg  (APR 17 19:14)  SpO2      96 %  (APR 17 19:14).   Vital Signs.   Measurements   4/17/2019 13:16 CDT      Weight Dosing             100 kg                             Weight Measured and Calculated in Lbs     220.46 lb                              Weight Estimated          100 kg                             Height/Length Dosing      180.3 cm                             Height/Length Estimated   180.3 cm                             Body Mass Index Estimated 30.76 kg/m2  .   Basic Oxygen Information   4/17/2019 19:14 CDT      SpO2                      96 %                             Oxygen Therapy            Room air    4/17/2019 18:50 CDT      SpO2                      96 %                             Oxygen Therapy            Room air    4/17/2019 17:50 CDT      SpO2                      96 %                             Oxygen Therapy            Room air    4/17/2019 16:50 CDT      SpO2                      92 %  LOW                             Oxygen Therapy            Room air    4/17/2019 15:50 CDT      SpO2                      98 %                             Oxygen Therapy            Room air    4/17/2019 13:16 CDT      SpO2                      99 %                             Oxygen Therapy            Room air  .   General:  No acute distress.   Skin:  Warm, dry, no rash,   21cm by 6cm at widest papular rash noted to anterior LLE with warmth and erythema.    Neck:  Supple.   Cardiovascular:  Regular rate and rhythm, No murmur, Normal peripheral perfusion, No edema.    Respiratory:  Breath sounds are equal, Symmetrical chest wall expansion, Respirations: Regular, Breath sounds: Clear.    Chest wall:  No tenderness, No deformity.    Back:  Nontender, Normal range of motion.    Musculoskeletal:  No swelling, no deformity, Ankle/foot: amputation of distal left 1st digit and right 1st and 2nd digits.    Gastrointestinal:  Soft, Nontender, Non distended, Normal bowel sounds.    Neurological:  Alert and oriented to person, place, time, and situation, No focal neurological deficit observed, CN II-XII intact, normal sensory observed, normal motor observed, normal speech observed, normal coordination observed.    Psychiatric:  Cooperative, appropriate  mood & affect.       Medical Decision Making   Documents reviewed:  Emergency department nurses' notes.   Orders  Launch Orders   Laboratory:  CMP (Order): Stat collect, 4/17/2019 13:17 CDT, Blood, Lab Collect, Print Label By Order Location, 4/17/2019 13:17 CDT  CBC w/ Auto Diff (Order): Now collect, 4/17/2019 13:17 CDT, Blood, Lab Collect, Print Label By Order Location, 4/17/2019 13:17 CDT  Patient Care:  Saline Lock Insert (Order): 4/17/2019 13:18 CDT, Launch Orders   Laboratory:  Sed Rate (Order): Stat collect, 4/17/2019 13:18 CDT, Blood, Lab Collect, Print Label By Order Location, 4/17/2019 13:18 CDT  CRP High Sensitivity (Order): Stat collect, 4/17/2019 13:18 CDT, Blood, Lab Collect, Print Label By Order Location, 4/17/2019 13:18 CDT.    Results review:  Lab results : Lab View   4/17/2019 15:03 CDT      Sodium Lvl                142 mmol/L                             Potassium Lvl             3.7 mmol/L                             Chloride                  110 mmol/L  HI                             CO2                       26.0 mmol/L                             Calcium Lvl               8.7 mg/dL                             Glucose Lvl               63 mg/dL  LOW                             BUN                       27.0 mg/dL  HI                             Creatinine                1.02 mg/dL                             eGFR-AA                   >60 mL/min/1.73 m2  NA                             eGFR-BRUNO                  >60 mL/min/1.73 m2  NA                             Bili Total                0.2 mg/dL                             Bili Direct               0.10 mg/dL                             Bili Indirect             0.10 mg/dL                             AST                       20 unit/L                             ALT                       26 unit/L                             Alk Phos                  95 unit/L                             Total Protein             7.4 gm/dL                              Albumin Lvl               3.10 gm/dL  LOW                             Globulin                  4.30 gm/dL  HI                             A/G Ratio                 0.7 ratio  LOW                             CRP High Sens             6.22 mg/L  HI                             Total CK                  28 unit/L                             WBC                       13.4 x10(3)/mcL  HI                             RBC                       4.53 x10(6)/mcL                             Hgb                       13.5 gm/dL                             Hct                       44.8 %                             Platelet                  261 x10(3)/mcL                             MCV                       98.9 fL  HI                             MCH                       29.8 pg                             MCHC                      30.1 gm/dL  LOW                             RDW                       16.6 %                             MPV                       9.3 fL  LOW                             Abs Neut                  9.08 x10(3)/mcL                             Neutro Auto               68 %  NA                             Lymph Auto                26 %  NA                             Mono Auto                 4 %  NA                             Eos Auto                  1 %  NA                             Abs Eos                   0.1 x10(3)/mcL                             Basophil Auto             0 %  NA                             Abs Neutro                9.08 x10(3)/mcL                             Abs Lymph                 3.4 x10(3)/mcL                             Abs Mono                  0.5 x10(3)/mcL                             Abs Baso                  0.1 x10(3)/mcL                             Sed Rate                  63 mm/hr  HI  .      Reexamination/ Reevaluation   Patient given morphine IV with improvement in pain initially. Ceftriaxone IV given.  Labs reviewed.  Was discussed with [facility] for  consult. Right sided stenosis noted on vascular study. Significant left sided stenosis not seen.  However, even appearance of left lower extremity and inability to walk decision made to admit patient for IV antibiotics and possible vascular intervention.  Patient is currently already on Xarelto and effient.         Impression and Plan   Diagnosis   Vascular occlusion (JDU64-MZ I99.8)   Cellulitis (SCR99-FR L03.90)   Plan   Condition: Stable.    Disposition: Admit time  4/17/2019 22:40:00, Admit to Inpatient Unit, Annabella ESCUDERO, Etelvina FORREST    Counseled: Patient, Regarding diagnosis, Regarding diagnostic results, Regarding treatment plan, Patient indicated understanding of instructions.    Notes: I, Emy Ravi, acted solely as a scribe for and in the presence of Dr. Mann who performed the service., I acknowledge that the documentation was provided by a scribe on the date of the service noted above and that the documentation in the chart reflects work and decisions made by me alone. .

## 2022-04-30 NOTE — DISCHARGE SUMMARY
Patient:   Vanesa Newsome            MRN: [id number]            FIN: [id number]               Age:   48 years     Sex:  Female     :  1970   Associated Diagnoses:   None   Author:   Saul Saleh PA-C      Basic Information   48-year-old female with lupus and multiple vascular problems among her serious systemic medical diagnoses. She has had previous left femoral popliteal bypass which is patent. A previous right femoral-popliteal bypass and re-op have failed. She has been diagnosed with lupus anticoagulant.  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 She is now admitted for PTA and stent placement of the SMA. This will be done by open left brachial approach so that the artery can be assessed and primarily repaired at the time of closure. The procedure, risk, and complications have been discussed in detail and plain language. Included in the discussion was the risk of brachial artery closure, bleeding, venous or median nerve injury, and potential for inability to cross and/or treat the lesion. The patient voiced understanding and is willing to proceed.    Brief Operative Note   Operative Information   Date/ Time:  8/3/2018 12:36:00.     Preoperative Diagnosis: SMA occlusion, SLE.     Postoperative Diagnosis: same.     Procedures Performed: PTA/ stent of SMA w/ 7x100 Everflex stent via Lt brachial artery cutdown, flush aortagram, selective SMA angiogram, primary repair of LBA.     Surgeon: Olga ESCUDERO, Francisco YEUNG.     Assistant: Saul Saleh PA-C, J Taylor CRT.     Esimated blood loss: loss  20  cc.       POD 1  feels much better  l arm no swelling   wound looks good  vss  dc home  on effient and xarelto for anticoag          Health Status   Allergies:    Allergic Reactions (Selected)  Severity Not Documented  Vancomycin-  Hives and blotchy.,    Allergies (1) Active Reaction  vancomycin Blotchy     Current medications:  (Selected)   Inpatient Medications  Ordered  Zofran 2 mg/mL injectable solution: 4 mg, form: Injection, IV Push, Once, first dose 18 18:00:00 CDT, stop date 18 18:00:00 CDT  aspirin 81 mg oral tablet, CHEWABLE: 324 mg, form: Tab-Chew, Oral, Once, first dose 18 18:21:00 CDT, stop date 18 18:21:00 CDT, 4 chew tab = 5 grain ASA  morphine 1 mg/mL PF injectable solution: 5 mg, form: Injection, IV, Once, first dose 17 18:26:00 CST, stop date 17 18:26:00 CST, STAT, 24  morphine 2 mg/mL injectable solution: 4 mg, form: Soln, IV, Once, first dose 18 18:00:00 CDT, stop date 18 18:00:00 CDT  Prescriptions  Prescribed  K-Dur 20 oral tablet, extended release: 20 mEq = 1 tab(s), Oral, BID, # 60 tab(s), 0 Refill(s)  Norvasc 5 mg oral tablet: 5 mg = 1 tab(s), Oral, Daily, # 30 tab(s), 0 Refill(s)  Protonix 20 mg ORAL enteric coated tablet: 20 mg = 1 tab(s), Oral, Daily, # 30 tab(s), 3 Refill(s), Pharmacy: [id number]  Wellbutrin  mg/24 hours oral tablet, extended release: 150 mg = 1 tab(s), Oral, q24hr, Take in the mornings, # 30 tab(s), 3 Refill(s), Pharmacy: [id number]  Zofran ODT 8 mg oral tablet, disintegratin mg = 1 tab(s), Oral, TID, # 90 tab(s), 1 Refill(s), Pharmacy: [id number]  Zoloft 50 mg oral tablet: 50 mg = 1 tab(s), Oral, Daily, # 30 tab(s), 5 Refill(s), [id number]  levothyroxine 50 mcg (0.05 mg) oral tablet: 50 mcg = 1 tab(s), Oral, Daily, # 30 tab(s), 2 Refill(s), [id number]  ursodiol 250 mg oral tablet: 250 mg = 1 tab(s), Oral, TID, with food, # 90 tab(s), 3 Refill(s), Pharmacy: [id number]  Documented Medications  Documented  Effient 10 mg oral tablet: 10 mg = 1 tab(s), Oral, Daily, 0 Refill(s)  Percocet 5/325 oral tablet: 1 tab(s), Oral, q4hr,  PRN PRN as needed for pain, 0 Refill(s)  Xarelto 20mg Tablet: 10 mg = 0.5 tab(s), Oral, Daily, 0 Refill(s)  acyclovir 400 mg oral tablet: 400 mg = 1 tab(s), Oral, BID, # 14 tab(s), 0 Refill(s)  azaTHIOprine 50 mg oral tablet: 1 tab, Oral, Daily, 0 Refill(s)  cyclobenzaprine 5 mg oral tablet: 5 mg = 1 tab(s), Oral, TID, # 21 tab(s), 0 Refill(s)  diphenhydrAMINE 25 mg oral tablet: 1 tab, Oral, TID, 0 Refill(s)  gabapentin 100 mg oral capsule: 300 mg = 3 cap(s), Oral, At Bedtime, # 90 cap(s), 0 Refill(s)  hydroxychloroquine 200 mg oral tablet: 1 tab, Oral, Daily, 0 Refill(s)  prednisONE 20 mg oral tablet: 20 mg = 1 tab(s), Oral, BID, 0 Refill(s),    No qualifying data available     Problem list:    All Problems (Selected)  Abdominal pain / SNOMED CT 26765759 / Confirmed  Anemia / SNOMED CT 506360465 / Confirmed  CHF - Congestive heart failure / SNOMED CT 233584762 / Confirmed  Chronic disease-related malnutrition / SNOMED CT 863120814545221 / Confirmed  Chronic disease-related malnutrition / SNOMED CT 569756269556615 / Confirmed  COPD (chronic obstructive pulmonary disease) / SNOMED CT 60662895 / Confirmed  Problem automatically updated based on documentation on Capillary Refills, Brachial Pulses, Radial Pulses, Femoral Pulses, Dorsalis Pulses, Ulnar pulses, Popliteal Pulses, Postibial Pulses, Edemas, Affect/Behavior, Orientation Assessment, Arterial Line Site.  Chronic UTI / SNOMED CT 449641290 / Confirmed  COPD - Chronic obstructive pulmonary disease / SNOMED CT 122047919 / Confirmed  Diarrhea / SNOMED CT 664606335 / Confirmed  Early satiety / SNOMED CT 7416954432 / Confirmed  Former smoker / SNOMED CT [id number] / Confirmed  GERD (gastroesophageal reflux disease) / SNOMED CT [id number] / Confirmed  Shingles rash / SNOMED CT 0695422 / Confirmed  Hyperhomocysteinemia / SNOMED CT 4723301174 / Confirmed  IGT (impaired glucose tolerance) / SNOMED CT 18786444 /  Confirmed  Inflammatory polyarthropathy / SNOMED CT 1632013039 / Confirmed  Loss of appetite / SNOMED CT 503578060 / Confirmed  Lupus anticoagulant positive / SNOMED CT 4139598371 / Confirmed  Nausea / SNOMED CT 6759046324 / Confirmed  Nausea and vomiting / SNOMED CT 97288039 / Confirmed  Obesity / SNOMED CT [id number] / Confirmed  PVD (peripheral vascular disease) / SNOMED CT 4268699507 / Confirmed  Problem automatically updated based on documentation on Capillary Refills, Brachial Pulses, Radial Pulses, Femoral Pulses, Dorsalis Pulses, Ulnar pulses, Popliteal Pulses, Postibial Pulses, Edemas, Affect/Behavior, Orientation Assessment, Arterial Line Site.  Phlebitis of left leg / SNOMED CT 328156591 / Confirmed  Plaque psoriasis / SNOMED CT 794659153 / Confirmed  Seroma / SNOMED CT 58239993 / Confirmed  cellulitis  Asthma / SNOMED CT 7241773686 / Confirmed  Tobacco user / SNOMED CT 914157510 / Probable  Tobacco user / SNOMED CT 901211620 / Confirmed  Unexplained weight loss / SNOMED CT 9536625844 / Confirmed  Weakness / SNOMED CT 83065894 / Confirmed  Weight loss / SNOMED CT 415855215 / Confirmed      Physical Examination      No qualifying data available      Review / Management   Results review:     Labs (Last four charted values)  WBC                  5.0 (AUG 04) 7.8 (AUG 02)   Hgb                  L 9.1 (AUG 04) L 9.9 (AUG 02)   Hct                  L 29.2 (AUG 04) L 30.0 (AUG 02)   Plt                  L 66 (AUG 04) L 72 (AUG 02)   Na                   142 (AUG 04) 142 (AUG 04) 138 (AUG 02)   K                    4.0 (AUG 04) 4.0 (AUG 04) L 3.0 (AUG 02)   CO2                  21.0 (AUG 04) 21.0 (AUG 04) 22.0 (AUG 02)   Cl                   H 111 (AUG 04) H 111 (AUG 04) 107 (AUG 02)   Cr                   0.86 (AUG 04) 0.86 (AUG 04) 0.79 (AUG 02)   BUN                  9.0 (AUG 04) 9.0 (AUG 04) 8.0 (AUG 02)   Glucose Random       H 162 (AUG 04) H 162 (AUG 04) H 107 (AUG 02)   PT                    H 16.3 (AUG 02)   INR                  1.27 (AUG 02)   PTT                  H 73.6 (AUG 02) .

## 2022-04-30 NOTE — ED PROVIDER NOTES
Patient:   Vanesa Newsome            MRN: [id number]            FIN: [id number]               Age:   48 years     Sex:  Female     :  1970   Associated Diagnoses:   COPD exacerbation   Author:   Kady Batista      Basic Information   Time seen: Date & time 2019 16:18:00.   History source: Patient.   Arrival mode: Private vehicle.   History limitation: None.      History of Present Illness   The patient presents with 47 y/o female who presents with cough and sob since thursday. subjective fever at night. saw pcp on , received steroid shot and put on albuterol inhaler and augmentin. states not improving, feels as though she is getting worse. nonsmoker of cigarettes, does smoke marajuana. hx lei Ceballos and     I, , assumed care of this patient at 17:25.    47 y/o CF with a history of RA and COPD presents to ED with non-productive cough and SOB since Thursday(1/10/19). Pt saw her Dr on Thursday and was put on antibiotics and breathing treatments. Pt also complains of a reoccurring rash that recently returned to her legs. Pt denies recent hospitalization. .  The onset was 2  days ago.  The course/duration of symptoms is worsening.  Degree at onset mild.  Degree at present moderate.  The Exacerbating factors is none.  The Relieving factors is none.  Risk factors consist of chronic obstructive pulmonary disease.  Prior episodes: none.  Therapy today: none.  Associated symptoms: cough.        Review of Systems   Constitutional symptoms:  Negative except as documented in HPI.   Skin symptoms:  Rash.   Eye symptoms:  Negative except as documented in HPI.   ENMT symptoms:  Negative except as documented in HPI.   Respiratory symptoms:  Shortness of breath, cough, No sputum production,    Cardiovascular symptoms:  Negative except as documented in HPI.   Gastrointestinal symptoms:  Negative except as documented in HPI.   Genitourinary symptoms:  Negative except as documented  in HPI.   Musculoskeletal symptoms:  Negative except as documented in HPI.   Neurologic symptoms:  Negative except as documented in HPI.      Health Status   Allergies:    Allergic Reactions (Selected)  Severity Not Documented  Vancomycin- Hives and blotchy..   Medications:  (Selected)   Inpatient Medications  Ordered  Solumedrol IV push / IM: 125 mg, form: Injection, IV, Once, first dose 10/08/18 11:48:00 CDT, stop date 10/08/18 11:48:00 CDT, STAT  Zofran 2 mg/mL injectable solution: 4 mg, form: Injection, IV Push, Once, first dose 03/16/18 18:00:00 CDT, stop date 03/16/18 18:00:00 CDT  aspirin 81 mg oral tablet, CHEWABLE: 324 mg, form: Tab-Chew, Oral, Once, first dose 03/16/18 18:21:00 CDT, stop date 03/16/18 18:21:00 CDT, 4 chew tab = 5 grain ASA  morphine 1 mg/mL PF injectable solution: 5 mg, form: Injection, IV, Once, first dose 11/23/17 18:26:00 CST, stop date 11/23/17 18:26:00 CST, STAT, 24  morphine 2 mg/mL injectable solution: 4 mg, form: Soln, IV, Once, first dose 03/16/18 18:00:00 CDT, stop date 03/16/18 18:00:00 CDT  Prescriptions  Prescribed  Augmentin 875 mg-125 mg oral tablet: = 1 tab(s), Oral, q12hr, X 10 day(s), # 20 tab(s), 0 Refill(s), Pharmacy: [id number]  Effient 10 mg oral tablet: 10 mg = 1 tab(s), Oral, Daily, # 30 tab(s), 1 Refill(s), Pharmacy: [id number]  Hycet 7.5 mg-325 mg/15 mL oral solution: 15 mL, Oral, q6hr, PRN PRN cough, # 120 mL, 0 Refill(s)  Norco 10 mg-325 mg oral tablet: 1 tab(s), Oral, BID, # 60 tab(s), 0 Refill(s)  Norvasc 5 mg oral tablet: 5 mg = 1 tab(s), Oral, Daily, # 30 tab(s), 0 Refill(s)  Protonix 20 mg ORAL enteric coated tablet: 20 mg = 1 tab(s), Oral, Daily, # 30 tab(s), 3 Refill(s), Pharmacy: [id number]  Wellbutrin  mg/24 hours oral tablet, extended release: 150 mg = 1 tab(s), Oral, q24hr, Take in the mornings, # 30 tab(s), 3 Refill(s), [facility]acy: [id number]  Xarelto 10mg Tablet: 10 mg = 1 tab(s), Oral,  Daily, # 30 tab(s), 1 Refill(s), Pharmacy: [id number]  Zofran ODT 8 mg oral tablet, disintegratin mg = 1 tab(s), Oral, TID, # 90 tab(s), 1 Refill(s), Pharmacy: [id number]  Zoloft 100 mg oral tablet: 100 mg = 1 tab(s), Oral, Daily, # 30 tab(s), 2 Refill(s), Pharmacy: [id number]  levothyroxine 50 mcg (0.05 mg) oral tablet: 50 mcg = 1 tab(s), Oral, Daily, # 30 tab(s), 2 Refill(s), [id number]  mupirocin 2% topical oint: See Instructions, TOP BID, # 22 gm, 1 Refill(s), Pharmacy: [id number]  prednisONE 20 mg oral tablet: 20 mg = 1 tab(s), Oral, BID, # 60 tab(s), 1 Refill(s), [id number]  ursodiol 250 mg oral tablet: 250 mg = 1 tab(s), Oral, TID, with food, # 90 tab(s), 3 Refill(s), [id number]  Documented Medications  Documented  POTASSIUM CL 20MEQ ER TABLETS: 20 mEq = 1 tab(s), Oral, BID  gabapentin 100 mg oral capsule: 300 mg = 3 cap(s), Oral, At Bedtime, # 90 cap(s), 0 Refill(s).      Past Medical/ Family/ Social History   Medical history:    Active  COPD (chronic obstructive pulmonary disease) ([id number])  Comments:  2015 CDT 15:22 CDT - SYSTEM  Problem automatically updated based on documentation on Capillary Refills, Brachial Pulses, Radial Pulses, Femoral Pulses, Dorsalis Pulses, Ulnar pulses, Popliteal Pulses, Postibial Pulses, Edemas, Affect/Behavior, Orientation Assessment, Arterial Line Site.  PVD (peripheral vascular disease) ([id number])  Comments:  2015 CDT 15:22 CDT - SYSTEM  Problem automatically updated based on documentation on Capillary Refills, Brachial Pulses, Radial Pulses, Femoral Pulses, Dorsalis Pulses, Ulnar pulses, Popliteal Pulses, Postibial Pulses, Edemas, Affect/Behavior, Orientation Assessment, Arterial Line Site.  Resolved  HTN (401.9):  Resolved on 2015 at 45 years.  Comments:  2015 CDT 15:22 CDT - SYSTEM  Problem automatically updated based on  documentation on Capillary Refills, Brachial Pulses, Radial Pulses, Femoral Pulses, Dorsalis Pulses, Ulnar pulses, Popliteal Pulses, Postibial Pulses, Edemas, Affect/Behavior, Orientation Assessment, Arterial Line Site.  PAD (peripheral artery disease) ([id number]):  Resolved on 8/8/2015 at 45 years.  Comments:  8/8/2015 CDT 15:22 CDT - SYSTEM  Problem automatically updated based on documentation on Capillary Refills, Brachial Pulses, Radial Pulses, Femoral Pulses, Dorsalis Pulses, Ulnar pulses, Popliteal Pulses, Postibial Pulses, Edemas, Affect/Behavior, Orientation Assessment, Arterial Line Site.  Rheumatoid Arthritis (630509095):  Resolved on 8/8/2015 at 45 years.  Comments:  8/8/2015 CDT 15:22 CDT - SYSTEM  Problem automatically updated based on documentation on Capillary Refills, Brachial Pulses, Radial Pulses, Femoral Pulses, Dorsalis Pulses, Ulnar pulses, Popliteal Pulses, Postibial Pulses, Edemas, Affect/Behavior, Orientation Assessment, Arterial Line Site.  Migraines ([id number]):  Resolved on 8/8/2015 at 45 years.  Comments:  8/8/2015 CDT 15:22 CDT - SYSTEM  Problem automatically updated based on documentation on Capillary Refills, Brachial Pulses, Radial Pulses, Femoral Pulses, Dorsalis Pulses, Ulnar pulses, Popliteal Pulses, Postibial Pulses, Edemas, Affect/Behavior, Orientation Assessment, Arterial Line Site.  Seizures ([id number]):  Resolved on 8/8/2015 at 45 years.  Comments:  9/22/2013 CDT 23:07 CDT - Philip DEVI, Domingo MA  in her youth    8/8/2015 CDT 15:22 CDT - SYSTEM  Problem automatically updated based on documentation on Capillary Refills, Brachial Pulses, Radial Pulses, Femoral Pulses, Dorsalis Pulses, Ulnar pulses, Popliteal Pulses, Postibial Pulses, Edemas, Affect/Behavior, Orientation Assessment, Arterial Line Site.  Depression (70137004):  Resolved on 8/8/2015 at 45 years.  Comments:  8/8/2015 CDT 15:22 CDT - SYSTEM  Problem automatically updated based on documentation  on Capillary Refills, Brachial Pulses, Radial Pulses, Femoral Pulses, Dorsalis Pulses, Ulnar pulses, Popliteal Pulses, Postibial Pulses, Edemas, Affect/Behavior, Orientation Assessment, Arterial Line Site.  Panic Attacks ([id number]):  Resolved on 8/8/2015 at 45 years.  Comments:  8/8/2015 CDT 15:22 CDT - SYSTEM  Problem automatically updated based on documentation on Capillary Refills, Brachial Pulses, Radial Pulses, Femoral Pulses, Dorsalis Pulses, Ulnar pulses, Popliteal Pulses, Postibial Pulses, Edemas, Affect/Behavior, Orientation Assessment, Arterial Line Site.  COPD (868970928):  Resolved.  Hypothyroid ([id number]):  Resolved on 8/8/2015 at 45 years.  Comments:  8/8/2015 CDT 15:22 CDT - SYSTEM  Problem automatically updated based on documentation on Capillary Refills, Brachial Pulses, Radial Pulses, Femoral Pulses, Dorsalis Pulses, Ulnar pulses, Popliteal Pulses, Postibial Pulses, Edemas, Affect/Behavior, Orientation Assessment, Arterial Line Site.  Neuropathy ([id number]):  Resolved on 8/8/2015 at 45 years.  Comments:  8/8/2015 CDT 15:22 CDT - SYSTEM  Problem automatically updated based on documentation on Capillary Refills, Brachial Pulses, Radial Pulses, Femoral Pulses, Dorsalis Pulses, Ulnar pulses, Popliteal Pulses, Postibial Pulses, Edemas, Affect/Behavior, Orientation Assessment, Arterial Line Site.  Lupus (061590295):  Resolved.  Atherosclerosis (55245858):  Resolved.  GERD - Gastro-esophageal reflux disease ([id number]):  Resolved.  Anxiety (09971218):  Resolved.  CHF - Congestive heart failure (663580055):  Resolved..   Surgical history:    Percutaneous Transluminal Angioplasty (.) performed by Francisco Espinoza MD on 8/3/2018 at 48 Years.  Comments:  8/3/2018 12:45 - Clarice Terrazas RN  auto-populated from documented surgical case  Right Leg Stents on 1/16/2018 at 47 Years.  Incision & Drainage Lower Extremity (Right) performed by Francisco Espinoza MD on  1/9/2017 at 46 Years.  Comments:  1/9/2017 18:26 - John Avila RN  auto-populated from documented surgical case  WOUND VAC PLACED in the month of 1/2017 at 46 Years.  Comments:  2/1/2017 10:02 - Dayna Jones DR  Bypass Femoral Popliteal (Right) performed by Francisco Espinoza MD on 11/7/2016 at 46 Years.  Comments:  11/7/2016 11:53 - Dasha Eastman RN  auto-populated from documented surgical case  Amputation Toe performed by Clarke Mcclure DPM on 2/23/2016 at 45 Years.  Comments:  2/23/2016 09:25 - Remberto Heart CRNA  auto-populated from documented surgical case  Amputation of toe (SNOMED CT 1202366457) on 2/23/2016 at 45 Years.  Comments:  4/26/2016 13:57 - Dayna Jones  RIGHT SECOND TOE  Bronchoscopy Fluoroscopy (.) performed by Saul Bains IV, MD on 9/23/2015 at 45 Years.  Comments:  9/23/2015 14:52 - Radu Phillip RRT  auto-populated from documented surgical case  Bronchoscopy w/ Alveolar Lavage (.) performed by Saul Bains IV, MD on 9/23/2015 at 45 Years.  Comments:  9/23/2015 14:52 - Radu Phillip RRT  auto-populated from documented surgical case  Bronchoscopy w/ Endobronch Biopsy(s) (.) performed by Saul Bains IV, MD on 9/23/2015 at 45 Years.  Comments:  9/23/2015 14:52 - Radu Phillip RRT  auto-populated from documented surgical case  Incision & Drainage Lower Extremity (Right) performed by Francisco Espinoza MD on 8/14/2015 at 45 Years.  Comments:  8/14/2015 14:24 - Rafaela Corey RN  auto-populated from documented surgical case  Bypass Femoral Popliteal (Right) performed by Francisco Espinoza MD on 7/28/2015 at 45 Years.  Comments:  7/28/2015 12:45 - Dasha Eastman RN  auto-populated from documented surgical case  Hysterectomy (SNOMED CT 987452430) on 7/1/2013 at 43 Years.  Comments:  11/7/2015 13:50 - Monie Mo.  tumor removed  Appendectomy; (CPT4 42123).  Cholecystectomy; (CPT4 86778).  Tonsillectomy  (SNOMED CT 475549908).  Ovarian Tumor Removal.  Comments:  9/22/2013 23:09 - Philip DEVI, Domingo MA  Path results - Benign  Bilateral Great Toe Amputation.  Left Fem-Pop Bypass.  Spleenectomy..   Family history:    Multiple sclerosis  Mother  Colon cancer  Grandfather  .   Social history: Alcohol use: Occasionally, Tobacco use: Denies, Drug use: Marijuana.      Physical Examination               Vital Signs   Vital Signs   1/12/2019 16:25 CST      Peripheral Pulse Rate     109 bpm  HI                             Respiratory Rate          24 br/min                             SpO2                      93 %  LOW                             Saturation Probe Site     Hand, left                             Oxygen Therapy            Nasal cannula                             Oxygen Flow Rate          2 L/min                             Systolic Blood Pressure   99 mmHg                             Diastolic Blood Pressure  79 mmHg                             Mean Arterial Pressure, Cuff              86 mmHg    1/12/2019 16:16 CST      Temperature Oral          37.7 DegC                             Temperature Oral (calculated)             99.86 DegF                             Peripheral Pulse Rate     118 bpm  HI                             Respiratory Rate          22 br/min                             SpO2                      95 %                             Oxygen Therapy            Room air                             Systolic Blood Pressure   106 mmHg                             Diastolic Blood Pressure  78 mmHg  .   Measurements   1/12/2019 16:16 CST      Weight Dosing             104.5 kg                             Weight Measured and Calculated in Lbs     230.38 lb                             Weight Estimated          104.5 kg                             Height/Length Dosing      180 cm                             Height/Length Estimated   180 cm                             Body Mass Index Estimated 32.25 kg/m2  .    Basic Oxygen Information   1/12/2019 16:25 CST      SpO2                      93 %  LOW                             Oxygen Flow Rate          2 L/min                             Oxygen Therapy            Nasal cannula    1/12/2019 16:16 CST      SpO2                      95 %                             Oxygen Therapy            Room air  .   General:  Alert, no acute distress.    Skin:  Warm, dry, intact, Rash: chronic erythematous rash to UE and LE.    Head:  Normocephalic, atraumatic.    Eye   Cardiovascular:  Regular rate and rhythm, Normal peripheral perfusion, No edema.    Respiratory:  Respirations: Tachypneic, Breath sounds: Diminished, wheezes present diffuse.    Gastrointestinal:  Soft, Nontender, Non distended, Normal bowel sounds, Guarding: Negative, Rebound: Negative.    Musculoskeletal:  No deformity.   Neurological:  Alert and oriented to person, place, time, and situation, No focal neurological deficit observed, normal speech observed.    Psychiatric:  Cooperative, appropriate mood & affect.       Medical Decision Making   Documents reviewed:  Emergency department nurses' notes.   Orders  Launch Orders   Laboratory:  PTT (Order): Stat collect, 1/12/2019 16:19 CST, Blood, Lab Collect, Print Label By Order Location, 1/12/2019 16:19 CST  PT (Order): Stat collect, 1/12/2019 16:19 CST, Blood, Lab Collect, Print Label By Order Location, 1/12/2019 16:19 CST  BNP (Order): Stat collect, 1/12/2019 16:19 CST, Blood, Lab Collect, Print Label By Order Location, 1/12/2019 16:19 CST  Troponin-I (Order): Stat collect, 1/12/2019 16:19 CST, Blood, Lab Collect, Print Label By Order Location, 1/12/2019 16:19 CST  CMP (Order): Stat collect, 1/12/2019 16:19 CST, Blood, Lab Collect, Print Label By Order Location, 1/12/2019 16:19 CST  CBC w/ Auto Diff (Order): Now collect, 1/12/2019 16:19 CST, Blood, Lab Collect, Print Label By Order Location, 1/12/2019 16:19 CST  Radiology:  XR Chest 1 View (Order): Stat, 1/12/2019 16:19  CST, Dyspnea, None, Wheelchair, Patient Has IV?, Rad Type, Not Scheduled  Cardiology:  EKG (Order): 1/12/2019 16:19 CST, Wheelchair, Patient Has IV, Standard Precautions, NOW, -1, -1, 1/12/2019 16:19 CST.   Results review:  Lab results : Lab View   1/12/2019 16:19 CST      Sodium Lvl                136 mmol/L                             Potassium Lvl             4.7 mmol/L                             Chloride                  104 mmol/L                             CO2                       22.0 mmol/L                             Calcium Lvl               8.0 mg/dL  LOW                             Glucose Lvl               133 mg/dL  HI                             BUN                       14.0 mg/dL                             Creatinine                1.02 mg/dL                             eGFR-AA                   >60 mL/min/1.73 m2  NA                             eGFR-BRUNO                  >60 mL/min/1.73 m2  NA                             Bili Total                0.6 mg/dL                             Bili Direct               0.10 mg/dL                             Bili Indirect             0.50 mg/dL                             AST                       38 unit/L  HI                             ALT                       23 unit/L                             Alk Phos                  149 unit/L  HI                             Total Protein             6.9 gm/dL                             Albumin Lvl               2.60 gm/dL  LOW                             Globulin                  4.30 gm/dL  HI                             A/G Ratio                 0.6  NA                             BNP                       8 pg/mL                             Troponin-I                <0.02 ng/mL                             PT                        14.7 second(s)                             INR                       1.1                             PTT                       73.7 second(s)  HI                             WBC                        10.8 x10(3)/mcL                             RBC                       4.44 x10(6)/mcL                             Hgb                       13.4 gm/dL                             Hct                       42.6 %                             Platelet                  138 x10(3)/mcL                             MCV                       95.9 fL  HI                             MCH                       30.2 pg                             MCHC                      31.5 gm/dL  LOW                             RDW                       14.7 %                             MPV                       10.9 fL                             Abs Neut                  8.12 x10(3)/mcL                             Neutro Auto               76 %  NA                             Lymph Auto                20 %  NA                             Mono Auto                 2 %  NA                             Eos Auto                  2 %  NA                             Abs Eos                   0.2 x10(3)/mcL                             Basophil Auto             0 %  NA                             Abs Neutro                8.12 x10(3)/mcL                             Abs Lymph                 2.1 x10(3)/mcL                             Abs Mono                  0.3 x10(3)/mcL                             Abs Baso                  0.0 x10(3)/mcL                             NRBC%                     0.3 %  HI    .   Radiology results:  Rad Results (ST)  < 12 hrs   Accession: [id number]  Order: XR Chest 1 View  Report Dt/Tm: 01/12/2019 17:01  Report:   Indication: Dyspnea     Findings: Compared to 10/8/2018. Heart size is normal and lungs are  clear bilaterally. Pulmonary vasculature is normal.     IMPRESSION: No evidence of acute disease.      .      Impression and Plan   Diagnosis   COPD exacerbation ([id number] J44.1)   Plan   Condition: Improved, Stable.    Disposition: Admit to Inpatient Unit.    Counseled: Patient, Regarding  diagnosis, Regarding diagnostic results, Regarding treatment plan, Patient indicated understanding of instructions.    Notes: I, Kady Batista, acted solely as a scribe for and in the presence of  who performed the service., I, Dr Hennessy have read note from scribe and I agree with history and physical except as amended by me.  All information was dictated from my history and my examination of patient..

## 2022-04-30 NOTE — H&P
CHIEF COMPLAINT:  Left leg pain and redness.    HISTORY OF PRESENT ILLNESS:  The patient is a 49-year-old female, known to me, who presented to the emergency department yesterday complaining of left leg pain and swelling and redness since she had awakened in the morning.  She said it had not been painful the previous day.  Given her history of vascular insufficiency, the patient presented to the emergency department to have that evaluated.  On evaluation, the patient was found to have cellulitis.  However, there were findings in the right lower extremity that indicated occlusion of grafts.  The patient was admitted for cautionary reasons and to be given IV Solu-Medrol.    PAST MEDICAL HISTORY:  Significant for severe lupus with severe peripheral vascular disease as a result with many stents having been placed, as well as bypass procedures in her bilateral lower extremities.  Also history of asthma and COPD.  The patient no longer smokes, however.    SOCIAL HISTORY:  Patient is with long-time partner.  She does not smoke cigarettes any longer.  Does not drink alcohol.    REVIEW OF SYSTEMS:  Patient denies any headache or blurred vision.  She denies any chest pain or shortness of breath, although there was 1 episode due to anxiety that passed with administration of Xanax.  No abdominal pain.  No other constitutional complaints.  Just the left lower extremity pain with the redness and some swelling.    PHYSICAL EXAMINATION:  GENERAL:  The patient is in no acute distress.     HEENT:  Within normal limits.     NECK:  Supple with no adenopathy.     HEART:  Regular rate and rhythm with no murmurs, rubs, or gallops.     LUNGS:  Clear to auscultation bilaterally.     ABDOMEN:  Soft, nontender, nondistended with positive bowel sounds.  No hepatosplenomegaly is noted.     EXTREMITIES:  The left lower extremity, the anterior portion of the tib/fib region to have some erythema with no edema.  There is also warmth and  some tenderness to palpation that apparently is classic cellulitis.  It is not circumferential.  It is just contained to the most anterior aspect.  The right lower extremity looks as usual.    OBSERVATION COURSE:  Laboratory results were obtained last evening in the emergency department.  Showed a metabolic panel which showed the patient to be a little bit on the dry side with a lowish blood sugar of 63, although the patient had not eaten dinner at that time.  High sensitivity CRP was 6.22.  White count was 13.4, although the patient has been on long-term oral steroids.  Sed rate was 63.  Patient also underwent an ultrasound in the emergency department of the bilateral lower extremities arterial.  The ultrasound performed on the right lower extremity showed occluded fem/pop bypass and severely reduced collateral flow that was identified in the posterior tibial artery and that was on the right-hand side.  The left lower extremity demonstrated fem/pop bypass patent with flow, so no abnormalities were noted.  It should be pointed out at this time the patient already has an appointment for May 1st to have the right lower extremity dealt with, and the complaints that she came in with today had nothing to do with the right lower extremity.     The vital signs are stable.  The patient is afebrile.  Her pain was controlled with oral pain medication, as well as some morphine.  The patient was given IV Solu-Medrol.  She was seen in consultation by [facility], who agreed with treating for cellulitis and send the patient home today on oral steroids and also for the patient to keep her followup appointment on May 1st.  The patient is instructed by me with the nurse at the bedside to take her oral prednisone 40 mg twice daily until Monday, at which time she will take 40 mg once daily.  The patient voiced understanding.  She was given a prescription for oral pain medications, namely Norco #40, one 3 times daily, no refills.     The  patient is discharged in stable condition.    DIAGNOSIS:  Cellulitis of the left lower extremity.    DIET:  As usual.    ACTIVITY:  As usual.    MEDICATIONS:  Per the reconciled medication list in the EMR.      INSTRUCTIONS:  She is to keep her scheduled appointment with Dr. Annabella flores, and to keep her scheduled appointment on 05/01 with [facility].        ______________________________  MD ROGER Newsome/  DD:  04/18/2019  Time:  03:22PM  DT:  04/18/2019  Time:  03:58PM  Job #:  [id number]    The H&P was reviewed, the patient was examined, and the following changes to the patients condition are noted:  ______________________________________________________________________________  ______________________________________________________________________________  ______________________________________________________________________________  [  ] No changes to the patient's condition:      ______________________________                                             ___________________  PHYSICIAN SIGNATURE                                                             DATE/TIME

## 2022-04-30 NOTE — ED PROVIDER NOTES
"   Patient:   Vanesa Newsome            MRN: [id number]            FIN: [id number]               Age:   48 years     Sex:  Female     :  1970   Associated Diagnoses:   Diarrhea; Dyspnea; Hypothyroid; Lupus; Vomiting   Author:   Lorenzo Schilling      Basic Information   Time seen: Date & time 2018 13:56:00.   History source: Patient.   Arrival mode: Private vehicle, walking.   History limitation: None.   Additional information: Patient's physician(s): Annabella ESCUDERO, Etelvina FORREST      History of Present Illness   The patient presents with Patient presents to ER today with complaint of n/v/d.  This has been ongoing for the past several months.  Patient has a history of lupus, copd.  Patient's also complaining of feeling weak and dizzy./ jose m box pa-c and     I, Dr. Coker, assumed care of this patient at 1426.    47 y/o CF with a history of Lupus, CHF, PAD, COPD and rheumatoid arthritis, presents to the ED with complaints of nausea, vomiting of bilious emesis, diarrhea with yellow stool and right upper abdominal pain, onset of "weeks". Pt reports that she was seen on 18 for abdominal pain, and was given Oxycodone and Prednisone which helped her pain, however, she says the pain is still present. She denies the pain being worse since last ED visit, however, she says "I can't take the pain anymore". .  The onset was "Weeks".  The character of symptoms is bilious.  The degree at present is moderate.  The exacerbating factor is none.  The relieving factor is none.  Risk factors consist of none.  Therapy today: none.  Associated symptoms: abdominal pain and diarrhea.        Review of Systems   Constitutional symptoms:  Weakness.   Skin symptoms:  Negative except as documented in HPI.   Eye symptoms:  Negative except as documented in HPI.   ENMT symptoms:  Negative except as documented in HPI.   Respiratory symptoms:  Negative except as documented in HPI.   Cardiovascular symptoms:  " Negative except as documented in HPI.   Gastrointestinal symptoms:  Abdominal pain, moderate, right upper quadrant, nausea, vomiting (Bilious emesis), diarrhea (yellow stool).    Musculoskeletal symptoms:  Negative except as documented in HPI.   Neurologic symptoms:  Negative except as documented in HPI.   Psychiatric symptoms:  Negative except as documented in HPI.             Additional review of systems information: All other systems reviewed and otherwise negative.      Health Status   Allergies:    Allergic Reactions (Selected)  Severity Not Documented  Vancomycin- Hives and blotchy..   Medications:  (Selected)   Inpatient Medications  Ordered  Zofran 2 mg/mL injectable solution: 4 mg, form: Injection, IV Push, Once, first dose 03/16/18 18:00:00 CDT, stop date 03/16/18 18:00:00 CDT  aspirin 81 mg oral tablet, CHEWABLE: 324 mg, form: Tab-Chew, Oral, Once, first dose 03/16/18 18:21:00 CDT, stop date 03/16/18 18:21:00 CDT, 4 chew tab = 5 grain ASA  morphine 1 mg/mL PF injectable solution: 5 mg, form: Injection, IV, Once, first dose 11/23/17 18:26:00 CST, stop date 11/23/17 18:26:00 CST, STAT, 24  morphine 2 mg/mL injectable solution: 4 mg, form: Soln, IV, Once, first dose 03/16/18 18:00:00 CDT, stop date 03/16/18 18:00:00 CDT  Prescriptions  Prescribed  Bactrim  mg-160 mg oral tablet: 1 tab(s), Oral, BID, X 7 day(s), # 14 tab(s), 0 Refill(s), [id number]  CellCept 250 mg oral capsule: 500 mg = 2 cap(s), Oral, BID, # 56 cap(s), 0 Refill(s)  Flexeril 5 mg oral tablet: 5 mg = 1 tab(s), Oral, TID, PRN PRN as needed for muscle spasm, # 15 tab(s), 0 Refill(s), [id number]  Norvasc 5 mg oral tablet: 5 mg = 1 tab(s), Oral, Daily, # 30 tab(s), 0 Refill(s)  Protonix 40 mg ORAL enteric coated tablet: 40 mg = 1 tab(s), Oral, Daily, # 30 tab(s), 1 Refill(s), Pharmacy: [id number]  Wellbutrin  mg/24 hours oral tablet, extended  release: 150 mg = 1 tab(s), Oral, q24hr, Take in the mornings, # 30 tab(s), 3 Refill(s), Pharmacy: [id number]  Zofran ODT 4 mg oral tablet, disintegratin mg = 1 tab(s), Oral, q8hr, PRN PRN as needed for nausea/vomiting, # 30 tab(s), 1 Refill(s), [id number]  Zoloft 50 mg oral tablet: 50 mg = 1 tab(s), Oral, Daily, # 30 tab(s), 5 Refill(s), [id number]  acetaminophen-hydrocodone 325 mg-10 mg oral tablet: 1 tab(s), Oral, q4hr, # 120 tab(s), 0 Refill(s)  erythromycin 0.5% ophthalmic ointment: 0.5 in, Eye-Left, BID, # 3.5 gm, 0 Refill(s), Pharmacy: [id number]  levothyroxine 50 mcg (0.05 mg) oral tablet: 50 mcg = 1 tab(s), Oral, Daily, # 30 tab(s), 2 Refill(s), Pharmacy: [id number]  Documented Medications  Documented  Imuran 50 mg oral tablet: 100 mg = 2 tab(s), Oral, Daily, 0 Refill(s)  OXYCODONE 10MG IMMEDIATE  REL TABS: 10 mg = 1 tab(s), Oral, QID.      Past Medical/ Family/ Social History   Medical history:    Active  COPD (chronic obstructive pulmonary disease) (83184201)  Comments:  2015 CDT 15:22 CDT - SYSTEM  Problem automatically updated based on documentation on Capillary Refills, Brachial Pulses, Radial Pulses, Femoral Pulses, Dorsalis Pulses, Ulnar pulses, Popliteal Pulses, Postibial Pulses, Edemas, Affect/Behavior, Orientation Assessment, Arterial Line Site.  PVD (peripheral vascular disease) ([id number])  Comments:  2015 CDT 15:22 CDT - SYSTEM  Problem automatically updated based on documentation on Capillary Refills, Brachial Pulses, Radial Pulses, Femoral Pulses, Dorsalis Pulses, Ulnar pulses, Popliteal Pulses, Postibial Pulses, Edemas, Affect/Behavior, Orientation Assessment, Arterial Line Site.  Resolved  HTN (401.9):  Resolved on 2015 at 45 years.  Comments:  2015 CDT 15:22 CDT - SYSTEM  Problem automatically updated based on documentation on Capillary  Refills, Brachial Pulses, Radial Pulses, Femoral Pulses, Dorsalis Pulses, Ulnar pulses, Popliteal Pulses, Postibial Pulses, Edemas, Affect/Behavior, Orientation Assessment, Arterial Line Site.  PAD (peripheral artery disease) ([id number]):  Resolved on 8/8/2015 at 45 years.  Comments:  8/8/2015 CDT 15:22 CDT - SYSTEM  Problem automatically updated based on documentation on Capillary Refills, Brachial Pulses, Radial Pulses, Femoral Pulses, Dorsalis Pulses, Ulnar pulses, Popliteal Pulses, Postibial Pulses, Edemas, Affect/Behavior, Orientation Assessment, Arterial Line Site.  Rheumatoid Arthritis (411657728):  Resolved on 8/8/2015 at 45 years.  Comments:  8/8/2015 CDT 15:22 CDT - SYSTEM  Problem automatically updated based on documentation on Capillary Refills, Brachial Pulses, Radial Pulses, Femoral Pulses, Dorsalis Pulses, Ulnar pulses, Popliteal Pulses, Postibial Pulses, Edemas, Affect/Behavior, Orientation Assessment, Arterial Line Site.  Migraines (41801891):  Resolved on 8/8/2015 at 45 years.  Comments:  8/8/2015 CDT 15:22 CDT - SYSTEM  Problem automatically updated based on documentation on Capillary Refills, Brachial Pulses, Radial Pulses, Femoral Pulses, Dorsalis Pulses, Ulnar pulses, Popliteal Pulses, Postibial Pulses, Edemas, Affect/Behavior, Orientation Assessment, Arterial Line Site.  Seizures ([id number]):  Resolved on 8/8/2015 at 45 years.  Comments:  9/22/2013 CDT 23:07 CDT - Philip DEVI, Domingo MA  in her youth    8/8/2015 CDT 15:22 CDT - SYSTEM  Problem automatically updated based on documentation on Capillary Refills, Brachial Pulses, Radial Pulses, Femoral Pulses, Dorsalis Pulses, Ulnar pulses, Popliteal Pulses, Postibial Pulses, Edemas, Affect/Behavior, Orientation Assessment, Arterial Line Site.  Depression (27207558):  Resolved on 8/8/2015 at 45 years.  Comments:  8/8/2015 CDT 15:22 CDT - SYSTEM  Problem automatically updated based on documentation on Capillary Refills,  Brachial Pulses, Radial Pulses, Femoral Pulses, Dorsalis Pulses, Ulnar pulses, Popliteal Pulses, Postibial Pulses, Edemas, Affect/Behavior, Orientation Assessment, Arterial Line Site.  Panic Attacks ([id number]):  Resolved on 8/8/2015 at 45 years.  Comments:  8/8/2015 CDT 15:22 CDT - SYSTEM  Problem automatically updated based on documentation on Capillary Refills, Brachial Pulses, Radial Pulses, Femoral Pulses, Dorsalis Pulses, Ulnar pulses, Popliteal Pulses, Postibial Pulses, Edemas, Affect/Behavior, Orientation Assessment, Arterial Line Site.  COPD (627141842):  Resolved.  Hypothyroid ([id number]):  Resolved on 8/8/2015 at 45 years.  Comments:  8/8/2015 CDT 15:22 CDT - SYSTEM  Problem automatically updated based on documentation on Capillary Refills, Brachial Pulses, Radial Pulses, Femoral Pulses, Dorsalis Pulses, Ulnar pulses, Popliteal Pulses, Postibial Pulses, Edemas, Affect/Behavior, Orientation Assessment, Arterial Line Site.  Neuropathy (2540979900):  Resolved on 8/8/2015 at 45 years.  Comments:  8/8/2015 CDT 15:22 CDT - SYSTEM  Problem automatically updated based on documentation on Capillary Refills, Brachial Pulses, Radial Pulses, Femoral Pulses, Dorsalis Pulses, Ulnar pulses, Popliteal Pulses, Postibial Pulses, Edemas, Affect/Behavior, Orientation Assessment, Arterial Line Site.  Lupus (084298580):  Resolved.  Atherosclerosis (34095032):  Resolved.  GERD - Gastro-esophageal reflux disease ([id number]):  Resolved.  Anxiety (27429591):  Resolved.  Psoriasis (70302227):  Resolved..   Surgical history:    Right Leg Stents on 1/16/2018 at 47 Years.  Incision & Drainage Lower Extremity (Right) on 1/9/2017 at 46 Years.  Comments:  1/9/2017 18:26 - John Avila RN  auto-populated from documented surgical case  WOUND VAC PLACED in the month of 1/2017 at 46 Years.  Comments:  2/1/2017 10:02 - Dayna Jones DR  Bypass Femoral Popliteal (Right) on 11/7/2016 at 46  Years.  Comments:  11/7/2016 11:53 - Dasha Eastman RN  auto-populated from documented surgical case  Amputation Toe on 2/23/2016 at 45 Years.  Comments:  2/23/2016 09:25 - Una MORAN Rembertosteve Quintero  auto-populated from documented surgical case  Amputation of toe ([id number]) on 2/23/2016 at 45 Years.  Comments:  4/26/2016 13:57 - Dayna Jones  RIGHT SECOND TOE  Bronchoscopy Fluoroscopy (.) on 9/23/2015 at 45 Years.  Comments:  9/23/2015 14:52 - Radu Phillip RRT  auto-populated from documented surgical case  Bronchoscopy w/ Alveolar Lavage (.) on 9/23/2015 at 45 Years.  Comments:  9/23/2015 14:52 - Radu Phillip RRT  auto-populated from documented surgical case  Bronchoscopy w/ Endobronch Biopsy(s) (.) on 9/23/2015 at 45 Years.  Comments:  9/23/2015 14:52 - Radu Phillip RRT  auto-populated from documented surgical case  Incision & Drainage Lower Extremity (Right) on 8/14/2015 at 45 Years.  Comments:  8/14/2015 14:24 - Rafaela Corey RN  auto-populated from documented surgical case  Bypass Femoral Popliteal (Right) on 7/28/2015 at 45 Years.  Comments:  7/28/2015 12:45 - Dasha Eastman RN  auto-populated from documented surgical case  Hysterectomy ([id number]) on 7/1/2013 at 43 Years.  Comments:  11/7/2015 13:50 - Monie Mo.  tumor removed  Appendectomy; (95890).  Cholecystectomy; (53540).  Tonsillectomy (389190995).  Ovarian Tumor Removal.  Comments:  9/22/2013 23:09 - Philip DEVI, Domingo KENNEDY.  Path results - Benign  Bilateral Great Toe Amputation.  Left Fem-Pop Bypass.  Spleenectomy..   Family history:    Multiple sclerosis  Mother  Colon cancer  Grandfather  .   Social history: Alcohol use: Occasionally, Tobacco use: Occasionally, Drug use: Marijuana, Occupation: On disability.      Physical Examination               Vital Signs   Vital Signs   5/19/2018 14:29 CDT      SpO2                      99 %                             Oxygen Therapy            Room air    5/19/2018 13:53 CDT       Temperature Oral          37.1 DegC                             Temperature Oral (calculated)             98.78 DegF                             Peripheral Pulse Rate     84 bpm                             Respiratory Rate          18 br/min                             SpO2                      100 %                             Oxygen Therapy            Room air                             Systolic Blood Pressure   103 mmHg                             Diastolic Blood Pressure  72 mmHg  .   Measurements   5/19/2018 13:53 CDT      Weight Dosing             83 kg                             Weight Measured and Calculated in Lbs     182.98 lb                             Weight Estimated          83 kg                             Height/Length Dosing      180 cm                             Height/Length Estimated   180 cm                             Body Mass Index Estimated 25.62 kg/m2  .   Basic Oxygen Information   5/19/2018 14:29 CDT      SpO2                      99 %                             Oxygen Therapy            Room air    5/19/2018 13:53 CDT      SpO2                      100 %                             Oxygen Therapy            Room air  .   General:  Alert, no acute distress, well appearing, conversant.    Skin:  Warm, dry, pink, intact, Diffuse skin changes consistent with Lupus. Areas of erythematous, dry skin on the arms, legs, abdomen, face and back. Scars on legs from femoral bypass surgery.    Head:  Normocephalic, atraumatic.    Neck:  Supple, trachea midline.    Eye:  Pupils are equal, round and reactive to light, extraocular movements are intact, normal conjunctiva.    Ears, nose, mouth and throat:  Oral mucosa moist.   Cardiovascular:  Regular rate and rhythm, No murmur, Normal peripheral perfusion, No edema.    Respiratory:  Lungs are clear to auscultation, respirations are non-labored, breath sounds are equal, Symmetrical chest wall expansion.    Chest wall:  No tenderness.    Musculoskeletal:  Normal ROM, normal strength, no tenderness, no swelling, Missing right great and second toe and the tip of the left great toe.    Gastrointestinal:  Soft, Non distended, Tenderness: Moderate, right upper quadrant, Guarding: Negative, Rebound: Negative.    Neurological:  Alert and oriented to person, place, time, and situation, No focal neurological deficit observed, CN II-XII intact, normal sensory observed, normal motor observed, normal speech observed, normal coordination observed.    Psychiatric:  Cooperative, appropriate mood & affect, normal judgment.       Medical Decision Making   Documents reviewed:  Emergency department nurses' notes.   Results review:  Lab results : Lab View   5/19/2018 17:05 CDT      POC BNP iSTAT             99 pg/mL                             POC Troponin              0.01 ng/mL    5/19/2018 14:45 CDT      UA Appear                 CLEAR                             UA Color                  DK YELLOW                             UA Spec Grav              1.016                             UA Bili                   Negative                             UA pH                     6.5                             UA Urobilinogen           1.0                             UA Blood                  Negative                             UA Glucose                Negative                             UA Ketones                Negative                             UA Protein                1+                             UA Nitrite                Negative                             UA Leuk Est               Trace                             UA WBC                    NONE SEEN                             UA WBC Man                0-2 /HPF                             UA RBC                    NONE SEEN                             UA Bacteria               NONE SEEN /HPF                             UA Squam Epithelial       Few    5/19/2018 14:25 CDT      Sodium Lvl                141  mmol/L                             Potassium Lvl             3.4 mmol/L  LOW                             Chloride                  109 mmol/L  HI                             CO2                       23.0 mmol/L                             Calcium Lvl               7.6 mg/dL  LOW                             Glucose Lvl               103 mg/dL                             BUN                       6.0 mg/dL  LOW                             Creatinine                0.86 mg/dL                             eGFR-AA                   >60 mL/min/1.73 m2  NA                             eGFR-BRUNO                  >60 mL/min/1.73 m2  NA                             Bili Total                0.7 mg/dL                             Bili Direct               0.10 mg/dL                             Bili Indirect             0.60 mg/dL                             AST                       83 unit/L  HI                             ALT                       21 unit/L                             Alk Phos                  165 unit/L  HI                             Total Protein             6.9 gm/dL                             Albumin Lvl               2.50 gm/dL  LOW                             Globulin                  4.40 gm/dL  HI                             A/G Ratio                 0.6  NA                             Lipase Lvl                63 unit/L  LOW                             WBC                       5.6 x10(3)/mcL                             RBC                       3.50 x10(6)/mcL  LOW                             Hgb                       11.5 gm/dL  LOW                             Hct                       36.0 %  LOW                             Platelet                  84 x10(3)/mcL  LOW                             MCV                       102.9 fL  HI                             MCH                       32.9 pg  HI                             MCHC                      31.9 gm/dL  LOW                             RDW                        17.9 %  HI                             MPV                       11.7 fL                             Abs Neut                  2.16 x10(3)/mcL                             Neutro Auto               39 %  NA                             Lymph Auto                56 %  NA                             Mono Auto                 4 %  NA                             Eos Auto                  0 %  NA                             Abs Eos                   0.0 x10(3)/mcL                             Basophil Auto             0 %  NA                             Abs Neutro                2.16 x10(3)/mcL                             Abs Lymph                 3.1 x10(3)/mcL                             Abs Mono                  0.2 x10(3)/mcL                             Abs Baso                  0.0 x10(3)/mcL    .      Impression and Plan   Diagnosis   Diarrhea (IHT75-RZ R19.7)   Dyspnea (PNED [id number])   Hypothyroid (JVX55-XG E03.9)   Lupus (SGC38-QU M32.9)   Vomiting (VHI63-NG R11.10)   Plan   Condition: Stable.    Disposition: Place in Observation Telemetry Unit.    Counseled: Patient, Friend, Regarding diagnosis, Regarding diagnostic results, Regarding treatment plan, Patient indicated understanding of instructions.    Notes: I, Lorenzo Schilling, acted solely as a scribe for and in the presence of Dr. Coker who performed the service. .       Addendum      Teaching-Supervisory Addendum-Brief   Notes: I, Dr. Coker, personally performed the services described in this documentation as scribed in my presence and it is both accurate and complete..

## 2022-04-30 NOTE — DISCHARGE SUMMARY
DISCHARGE DATE:  01/14/2019    HOSPITAL COURSE:  The patient continued to improve throughout the course of her hospital stay.  On IV antibiotics, IV Solu-Medrol, and aerosolized nebulizers.  She became less short of breath with each day.  Her vital signs remained stable.  She remained afebrile, and her laboratory results were within normal limits.  On the day of discharge, the patient was no longer short of breath and felt ready to go home.  The patient was discharged home in stable condition.  Diet and activity are usual.    MEDICATIONS:  Per the reconciled medication list in the EMR.       She is to follow up with Dr. Winchester at her regularly scheduled visit.        ______________________________  Etelvina Winchester MD    EM/UN  DD:  02/15/2019  Time:  10:53AM  DT:  02/15/2019  Time:  01:10PM  Job #:  [id number]

## 2022-04-30 NOTE — DISCHARGE SUMMARY
Patient:   Vanesa Ricks            MRN: [id number]            FIN: [id number]               Age:   51 years     Sex:  Female     :  1970   Associated Diagnoses:   None   Author:   Enrique Villafuerte      History of Present Illness     Ms. Ricks is a 51 year-old female who is known to [facility].  She was admitted to [facility] for a scheduled peripheral angiogram with intervention.  She has PAD with [facility] 3 claudication of LLE, rest pain in toes, fempop occlusion, RSFA occluded, R pop stent occluded, L pop 100% occluded, and history of toe amputation.  Her past medical history includes PAD, STEPHEN, depression, COPD, GERD, and SLE.    On 21 she underwent successful procedure as stated below:      Peripheral Angiogram  21  Procedure Summary  Patient underwent successful laser atherectomy and balloon Angioplasty of the left common femoral artery and left  popliteal artery using radial pedal approach  Left radial artery was occluded but was crossed for angiography and posterior tibial artery access was used for  intervention    Today on the day of discharge, she feels okay in general.  She denies chest pain.  C/o some mild abdominal discomfort.           Review of Systems   Constitutional:  No chills, No sweats.    Ear/Nose/Mouth/Throat:  No nasal congestion, No sore throat.    Respiratory:  No shortness of breath, No cough.    Cardiovascular:  No chest pain.    Gastrointestinal:  Nausea, No vomiting.       Health Status   Allergies:    Allergic Reactions (Selected)  Severity Not Documented  Heparin- Hit (heparin induced thrombocytopenia) antibody.  Vancomycin- Hives and blotchy.,    Allergies (2) Active Reaction  heparin HIT (Heparin induced    thrombocytopenia) antibody  vancomycin Blotchy     Current medications:    Medications (4) Active  Scheduled: (3)  aspirin 81 mg Chew tab  81 mg 1 tab(s), Oral, Daily  clopidogrel 75 mg Tab UD  75 mg 1 tab(s), Oral, Daily  trazodone 50 mg Tab   50 mg 1 tab(s), Oral, Once a day (at bedtime)  Continuous: (0)  PRN: (1)  morphine 4 mg/mL preservative-free Soln  4 mg 1 mL, IV Push, q4hr        Physical Examination   General:  Alert and oriented, No acute distress.    Respiratory:  Lungs are clear to auscultation, Respirations are non-labored.    Cardiovascular:  Normal rate, Regular rhythm, left radial c palpable pulse; R and L PT with dopplerable pulses  Left radial and left PT flat without s/s of infection .    Gastrointestinal:  Soft, Normal bowel sounds.       Vital Signs (last 24 hrs)_____  Last Charted___________  Temp Oral     37.2 DegC  (JUN 24 08:05)  Heart Rate Peripheral   H 110bpm  (JUN 24 08:05)  Resp Rate         20 br/min  (JUN 24 08:05)  SBP      118 mmHg  (JUN 24 08:05)  DBP      79 mmHg  (JUN 24 08:05)  SpO2      94 %  (JUN 24 08:05)  Weight      104.2 kg  (JUN 23 12:45)  Height      180 cm  (JUN 23 12:45)  BMI      32.16  (JUN 23 12:45)     Integumentary:  Warm, Dry.    Neurologic:  Alert, Oriented.    Psychiatric:  Cooperative.       Review / Management   Results review:     Labs (Last four charted values)  WBC                  10.9 (JUN 24)   Hgb                  13.6 (JUN 24)   Hct                  41.6 (JUN 24)   Plt                  295 (JUN 24)   Na                   141 (JUN 24)   K                    4.0 (JUN 24)   CO2                  22 (JUN 24)   Cl                   H 109 (JUN 24)   Cr                   0.76 (JUN 24)   BUN                  16.4 (JUN 24)   Glucose Random       H 109 (JUN 24) .       Impression and Plan   Impression:  PAD s/p intervention   COPD  SLE    Plan:  Discharge home today  F/U with Dr. Villa within 1 week  Discharge Plan:  Discharge: Home  Discharge Diet: Cardiac, Low Sodium  Discharge Condition: Stable  Discharge Activity: As tolerated, NO heavy lifting >5lbs, Notify MD with symptoms of bleeding/infection  Discharge Time >30 minutes    Discharge Medications:   See DC med rec

## 2022-04-30 NOTE — DISCHARGE SUMMARY
DISCHARGE DATE:  05/21/2018    ADMISSION DIAGNOSES:    1. Nausea.  2. Vomiting.  3. Diarrhea.  4. Hypokalemia.    DISCHARGE DIAGNOSES:    1. Nausea, resolved.  2. Vomiting, resolved.  3. Hypokalemia, resolved.    HOSPITAL COURSE:  The patient continued to improve throughout the course of her hospital stay.  Indeed, after the ER, no more episodes of vomiting occurred.  Potassium was replaced and the patient's potassium level was normal the day of discharge at 4.4.  Patient's calcium level was low, however in relation to his albumin, it is normal.  The patient's platelets were normal for the patient, which is slightly low at 72, which is about equivalent to where the patient came in, if you allow for rehydration.  The results of the ultrasound of the abdomen showed no acute findings.  It showed that the liver was 19 cm and no lesions were seen.  However, there was evidence of fatty liver with the liver being enlarged.       The patient felt well enough to go home on the day of discharge.  Indeed, she was eating lasagna, as I entered the room.    PHYSICAL EXAMINATION:  GENERAL:  The patient was in no acute distress.   HEART:  Regular rate and rhythm.   LUNGS:  Clear to auscultation bilaterally.   EXTREMITIES:  Showed no clubbing, cyanosis or edema.   ABDOMEN:  Soft with slight tenderness to palpation in the right lower quadrant, however bowel sounds were normal and no masses were palpated.  There is no guarding and no rebound.    DISCHARGE INSTRUCTIONS:  The patient was instructed that if the pain got worse or did not continue to improve, that she should call my office and I would order a CT scan.  Patient verbalized understanding.  Patient also verbalized understanding of the need to taper her IV Solu-Medrol, which we will taper as prior discussion at patient's bedside.  Patient also verbalized understanding of this protocol.     The patient was discharged to home in stable condition.  Diet and activity are  usual.  Medications are per the reconciled medication list in the EMR.  She is to follow up with Dr. Winchester in 2-4 weeks at my office and she was instructed to call in the meantime if anything should become worse.  The patient verbalized understanding.        ______________________________  Etelvina Winchester MD    EM/TANNER  DD:  05/21/2018  Time:  05:21PM  DT:  05/22/2018  Time:  10:33AM  Job #:  [id number]

## 2022-04-30 NOTE — DISCHARGE SUMMARY
* Final Report *    HP (Verified)  CHIEF COMPLAINT:  Left leg pain and redness.    HISTORY OF PRESENT ILLNESS:  The patient is a 49-year-old female, known to me, who presented to the emergency department yesterday complaining of left leg pain and swelling and redness since she had awakened in the morning.  She said it had not been painful the previous day.  Given her history of vascular insufficiency, the patient presented to the emergency department to have that evaluated.  On evaluation, the patient was found to have cellulitis.  However, there were findings in the right lower extremity that indicated occlusion of grafts.  The patient was admitted for cautionary reasons and to be given IV Solu-Medrol.    PAST MEDICAL HISTORY:  Significant for severe lupus with severe peripheral vascular disease as a result with many stents having been placed, as well as bypass procedures in her bilateral lower extremities.  Also history of asthma and COPD.  The patient no longer smokes, however.    SOCIAL HISTORY:  Patient is with long-time partner.  She does not smoke cigarettes any longer.  Does not drink alcohol.    REVIEW OF SYSTEMS:  Patient denies any headache or blurred vision.  She denies any chest pain or shortness of breath, although there was 1 episode due to anxiety that passed with administration of Xanax.  No abdominal pain.  No other constitutional complaints.  Just the left lower extremity pain with the redness and some swelling.    PHYSICAL EXAMINATION:  GENERAL:  The patient is in no acute distress.     HEENT:  Within normal limits.     NECK:  Supple with no adenopathy.     HEART:  Regular rate and rhythm with no murmurs, rubs, or gallops.     LUNGS:  Clear to auscultation bilaterally.     ABDOMEN:  Soft, nontender, nondistended with positive bowel sounds.  No hepatosplenomegaly is noted.     EXTREMITIES:  The left lower extremity, the anterior portion of the tib/fib region to have some erythema with  no edema.  There is also warmth and some tenderness to palpation that apparently is classic cellulitis.  It is not circumferential.  It is just contained to the most anterior aspect.  The right lower extremity looks as usual.    OBSERVATION COURSE:  Laboratory results were obtained last evening in the emergency department.  Showed a metabolic panel which showed the patient to be a little bit on the dry side with a lowish blood sugar of 63, although the patient had not eaten dinner at that time.  High sensitivity CRP was 6.22.  White count was 13.4, although the patient has been on long-term oral steroids.  Sed rate was 63.  Patient also underwent an ultrasound in the emergency department of the bilateral lower extremities arterial.  The ultrasound performed on the right lower extremity showed occluded fem/pop bypass and severely reduced collateral flow that was identified in the posterior tibial artery and that was on the right-hand side.  The left lower extremity demonstrated fem/pop bypass patent with flow, so no abnormalities were noted.  It should be pointed out at this time the patient already has an appointment for May 1st to have the right lower extremity dealt with, and the complaints that she came in with today had nothing to do with the right lower extremity.     The vital signs are stable.  The patient is afebrile.  Her pain was controlled with oral pain medication, as well as some morphine.  The patient was given IV Solu-Medrol.  She was seen in consultation by [facility], who agreed with treating for cellulitis and send the patient home today on oral steroids and also for the patient to keep her followup appointment on May 1st.  The patient is instructed by me with the nurse at the bedside to take her oral prednisone 40 mg twice daily until Monday, at which time she will take 40 mg once daily.  The patient voiced understanding.  She was given a prescription for oral pain medications, namely Norco #40, one 3  times daily, no refills.     The patient is discharged in stable condition.    DIAGNOSIS:  Cellulitis of the left lower extremity.    DIET:  As usual.    ACTIVITY:  As usual.    MEDICATIONS:  Per the reconciled medication list in the EMR.      INSTRUCTIONS:  She is to keep her scheduled appointment with me, Dr. Winchester, and to keep her scheduled appointment on 05/01 with [facility].        ______________________________  Etelvina Winchester MD    EM/SR  DD:  04/18/2019  Time:  03:22PM  DT:  04/18/2019  Time:  03:58PM  Job #:  [id number]    The H&P was reviewed, the patient was examined, and the following changes to the patients condition are noted:  ______________________________________________________________________________  ______________________________________________________________________________  ______________________________________________________________________________  [  ] No changes to the patient's condition:      ______________________________                                             ___________________  PHYSICIAN SIGNATURE                                                             DATE/TIME       Result type: History and Physical  Result date: April 18, 2019 15:58 CDT  Result status: Auth (Verified)  Result title: HP  Performed by: Etelvina Winchester MD on April 18, 2019 15:22 CDT  Verified by: Etelvina Winchester MD on April 22, 2019 8:52 CDT  Encounter info: [id number], [facility], Observation, 4/17/2019 - 4/18/2019  Contributor system: [employer]

## 2022-04-30 NOTE — DISCHARGE SUMMARY
DISCHARGE DATE:  01/14/2019    ADMISSION DIAGNOSES:  Cough and shortness of breath.    DISCHARGE DIAGNOSIS:  Cough and shortness of breath, resolved.    HOSPITAL COURSE:  The patient continued to improve throughout the course of her hospital stay with IV antibiotics, namely Levaquin, and nebulized aerosols.  It should be noted the patient was treated by me with antibiotics previously the same week, and the patient said that she was doing nebulizers at home, and so presented to the ER for treatment in spite of that.  The patient was admitted and due to her history of COPD and asthma.  So the patient's shortness of breath gradually improved.  Her vital signs remained stable.  She remained afebrile, and the patient was ready to be discharged and asymptomatic on the day of discharge.  She was discharged home in stable condition.    DIAGNOSES:    1. Shortness of breath, resolved.   2. Acute respiratory distress with mild hypoxia, resolved.       Her diet and activity are as usual.    MEDICATIONS:  Per the reconciled medication list in the EMR.     She is to follow up with Dr. Winchester at her regular scheduled time.        ______________________________  Etelvina Winchester MD    EM/UN  DD:  02/15/2019  Time:  10:51AM  DT:  02/15/2019  Time:  01:37PM  Job #:  [id number]

## 2022-04-30 NOTE — H&P
Patient:   Vanesa Newsome            MRN: [id number]            FIN: [id number]               Age:   48 years     Sex:  Female     :  1970   Associated Diagnoses:   None   Author:   Hardik Carmona MD      DATE OF ADMIT: 2019 0:33  REFERRAL SOURCE: KATIE Hennessy MD   PCP: KVNG Winchester MD     CHIEF COMPLAINT: SOB and cough     HISTORY OF PRESENTING ILLNESS  Patient is a 48-year-old female with a past medical history as below most significant include a history of SLE for which she is only on oral prednisone, long-term, as well as severe peripheral arterial disease status post multiple peripheral procedures in the past including bypass and for which she is on Xarelto/Effient and has had a right great toe and a partial left great toe amputation.  She presented to the ER complaining of about 1 week long worsening progressive cough, and shortness of breath.  She feels as though there is mucus in her chest that she cannot get up from coughing.  She saw her primary care physician on the  of this month and was given a steroid shot and an antibiotic which did not improve her symptoms.  She presented back to the ER where she was mildly hypoxic and tachypnea, and placed on nasal cannula oxygen at 2 L with improvement in her respiratory status.  Her chest x-ray was unremarkable.  She was noted to have wheezing on exam.  Patient is unsure if she has a history of COPD or asthma, but reports she is prescribed inhalers at home and she was using those every 4 hours with no improvement.  Her laboratory work overall was unremarkable.  She was given IV antibiotics, steroids, nebulizer treatments and admitted to the hospitalist service for acute bronchitis bronchospasm.     REVIEW OF SYSTEMS  Except as documented, all other systems reviewed and negative    PAST MEDICAL HISTORY  Peripheral arterial disease, with great toe amputations and right fem-pop bypass, PTA stent of SMA  COPD  unquantified  Lupus/positive lupus anticoagulant- only on oral prednisone at this time  Anxiety/depression  Hypothyroidism  Arthritis  GERD  Hypertension  Peripheral vascular disease  Autoimmune thrombocytopenia    PAST SURGICAL HISTORY  Right Leg Stents: 01/16/18  WOUND VAC PLACED: 01/2017  Amputation of toe: 02/23/16  Hysterectomy: 07/01/13  Appendectomy;.  Cholecystectomy;.  Bilateral Great Toe Amputation  Ovarian Tumor Removal  Spleenectomy  Tonsillectomy  Left Fem-Pop Bypass    FAMILY HISTORY  Reviewed, noncontributory to current condition.    SOCIAL HISTORY  Former smoker denies current tobacco or drug use    ALLERGIES  vancomycin (Blotchy,Hives)    HOME MEDICATIONS  Effient 10 mg oral tablet 10 mg = 1 tab(s), Oral, Daily  gabapentin 100 mg oral capsule 300 mg = 3 cap(s), Oral, At Bedtime  Hycet 7.5 mg-325 mg/15 mL oral solution 15 mL, PRN, Oral, q6hr  levothyroxine 50 mcg (0.05 mg) oral tablet 50 mcg = 1 tab(s), Oral, Daily  mupirocin 2% topical oint See Instructions  Norco 10 mg-325 mg oral tablet 1 tab(s), Oral, BID  Norvasc 5 mg oral tablet 5 mg = 1 tab(s), Oral, Daily  POTASSIUM CL 20MEQ ER TABLETS 20 mEq = 1 tab(s), Oral, BID  prednisONE 20 mg oral tablet 20 mg = 1 tab(s), Oral, BID  Protonix 20 mg ORAL enteric coated tablet 20 mg = 1 tab(s), Oral, Daily  ursodiol 250 mg oral tablet 250 mg = 1 tab(s), Oral, TID  Wellbutrin  mg/24 hours oral tablet, extended release 150 mg = 1 tab(s), Oral, q24hr  Xarelto 10mg Tablet 10 mg = 1 tab(s), Oral, Daily  Zofran ODT 8 mg oral tablet, disintegrating 8 mg = 1 tab(s), Oral, TID  Zoloft 100 mg oral tablet 100 mg = 1 tab(s), Oral, Daily    PHYSICAL EXAM  Temp Oral     37.0 DegC  (JAN 12 22:00)  Heart Rate Peripheral   H 118bpm  (JAN 12 22:00)  Resp Rate         22 br/min  (JAN 12 21:54)  SBP      106 mmHg  (JAN 12 22:00)  DBP      63 mmHg  (JAN 12 22:00)  SpO2      95 %  (JAN 12 22:00)  GENERAL: awake, alert, oriented and in no acute distress  HEENT: NC/AT,  EOMI, Pupils equal, CN 2-12 intact   LUNGS: Diffuse respiratory wheezing, bilateral rhonchi, no tachypnea or accessory muscle use  CVS: Regular rate and rhythm, normal peripheral perfusion  ABD: Soft, non-tender, non-distended, bowel sounds present  EXTREMITIES: no clubbing or cyanosis; R great toe amputated and paritial L great toe   SKIN: Warm, dry. No rashes or lesions  NEURO: AAOx3, no focal neurological deficit  PSYCHIATRIC: Cooperative    LABS  Chemistry  Hematology/Coagulation    Sodium Lvl: 136 mmol/L (01/12/19 16:19:00) PT: 14.7 second(s) (01/12/19 16:19:00)   Potassium Lvl: 4.7 mmol/L (01/12/19 16:19:00) INR: 1.1 (01/12/19 16:19:00)   Chloride: 104 mmol/L (01/12/19 16:19:00) PTT: 73.7 second(s) High (01/12/19 16:19:00)   CO2: 22 mmol/L (01/12/19 16:19:00) WBC: 10.8 x10(3)/mcL (01/12/19 16:19:00)   Calcium Lvl: 8 mg/dL Low (01/12/19 16:19:00) RBC: 4.44 x10(6)/mcL (01/12/19 16:19:00)   Glucose Lvl: 133 mg/dL High (01/12/19 16:19:00) Hgb: 13.4 gm/dL (01/12/19 16:19:00)   BUN: 14 mg/dL (01/12/19 16:19:00) Hct: 42.6 % (01/12/19 16:19:00)   Creatinine: 1.02 mg/dL (01/12/19 16:19:00) Platelet: 138 x10(3)/mcL (01/12/19 16:19:00)   eGFR-AA: >60 (01/12/19 16:19:00) MCV: 95.9 fL High (01/12/19 16:19:00)   eGFR-BRUNO: >60 (01/12/19 16:19:00) MCH: 30.2 pg (01/12/19 16:19:00)   Bili Total: 0.6 mg/dL (01/12/19 16:19:00) MCHC: 31.5 gm/dL Low (01/12/19 16:19:00)   Bili Direct: 0.1 mg/dL (01/12/19 16:19:00) RDW: 14.7 % (01/12/19 16:19:00)   Bili Indirect: 0.5 mg/dL (01/12/19 16:19:00) MPV: 10.9 fL (01/12/19 16:19:00)   AST: 38 unit/L High (01/12/19 16:19:00) Abs Neut: 8.12 x10(3)/mcL (01/12/19 16:19:00)   ALT: 23 unit/L (01/12/19 16:19:00) Neutro Auto: 76 % (01/12/19 16:19:00)   Alk Phos: 149 unit/L High (01/12/19 16:19:00) Lymph Auto: 20 % (01/12/19 16:19:00)   Total Protein: 6.9 gm/dL (01/12/19 16:19:00) Mono Auto: 2 % (01/12/19 16:19:00)   Albumin Lvl: 2.6 gm/dL Low (01/12/19 16:19:00) Eos Auto: 2 % (01/12/19 16:19:00)    Globulin: 4.3 gm/dL High (01/12/19 16:19:00) Abs Eos: 0.2 x10(3)/mcL (01/12/19 16:19:00)   A/G Ratio: 0.6 (01/12/19 16:19:00) Basophil Auto: 0 % (01/12/19 16:19:00)   BNP: 8 pg/mL (01/12/19 16:19:00) Abs Neutro: 8.12 x10(3)/mcL (01/12/19 16:19:00)   Troponin-I: <0.02 (01/12/19 16:19:00) Abs Lymph: 2.1 x10(3)/mcL (01/12/19 16:19:00)    Abs Mono: 0.3 x10(3)/mcL (01/12/19 16:19:00)    Abs Baso: 0 x10(3)/mcL (01/12/19 16:19:00)    NRBC%: 0.3 % High (01/12/19 16:19:00)      RADIOLOGY  Chest x-ray showed no acute process      ASSESSMENT  Respiratory distress and mild hypoxia secondary to likely acute bronchitis with bronchospasm  Reported history of COPD, with no improvement on home inhalers  History of SLE, only on oral prednisone 20 mg twice a day at this time  Severe peripheral arterial disease status post peripheral bypass, on Xarelto and Effient     PLAN  - Continue IV steroids, nebulizer treatments and supplemental oxygen as needed  - Continue antibiotics  - Add Mucinex  - Wean oxygen as tolerated  - Cough suppressants as needed  - Antinea home medications as appropriate    ADVANCED DIRECTIVES: none full code  VTE PROPHYLAXIS: on xarelto     Total time spent on admission: 71 minutes

## 2022-05-04 NOTE — HISTORICAL [facility] CERNER
This is a historical note converted from [employer]. Formatting and pictures may have been removed.  Please reference [employer] for original formatting and attached multimedia. Admit and Discharge Dates  Admit Date: 05/28/2020  Discharge Date: 05/30/2020  Physicians  Attending Physician - Yane ESCUDERO, Abdoulaye AZAR  Admitting Physician - Yane ESCUDERO, Abdoulaye AZAR  Primary Care Physician - No PCP, No  Discharge Diagnosis  1.?Urinary tract infection due to Klebsiella species?N39.0  ?acute cystitis  2.?Acute pyelonephritis?N10  3.?COPD (chronic obstructive pulmonary disease)?J44.9  4.?Atherosclerosis of native arteries of extremities with intermittent claudication, bilateral legs?I70.213  Hospital Course  Ms. Ricks is a 50 year old white female with a medical history of severe PAD s/p?SMA stent/B fem-pop bypass/B great toe and?right 2nd toe amputations, CAD, COPD, NIDDM2, Lupus, Hypothyroidism, RA, anxiety/depression, and migraines. She presented to [facility] ER with complaints of bilateral flank pain, L > R,?over the last week with intermittent nausea/vomiting, subjective fever and dark, foul smelling urine. She was seen in the ED on 5/25/20 with bilateral flank pain and hematuria for several days. CT abd/pelvis was negative for acute concerns. UA was remarkable for UTI. She was discharged in stable condition from the ED; prescribed Broughton, Augmentin BID x 14 days, and Zofran.?Compliant with antibiotics as prescribed.  Of note, she was admitted to our service from 4/23/20-5/1/20 for RLE cellulitis, E. coli UTI, Right Fem-Pop Bypass Graft Occlusion, REANNA, and symptomatic bradycardia. ID was consulted; she received Rocephin IV x 4 doses during admission and was transitioned to Cefdinir BID x 10 days upon discharge.?  Initial ER VS: /96, , respirations 14, oral temp 38.1, and SPO2 95% on RA.? CO2 19, BUN/Cr 6.8/0.76, lactic acid 2.2 with repeat 1.7, CBC unremarkable. UA with WBC 40, 1+ leuko, nitrite (-), no bacteria, 3+ blood and 1-2  RBC. No diagnostic imaging was performed while in the ER. She was given Norco, Rocephin 2gm, Toradol 30mg IV, Zofran 4 IV, and 1L NS while in the ER. She was admitted to the hospitalist service for further evaluation and management.? Urine C&S was positive for Klebsiella.? Pt has been afebrile for over 24 hours and is tolerating oral intake.? She is on room air and hemodynamically stable for discharge on oral antibiotics.  Time Spent on discharge  35 minutes  Objective  Vitals & Measurements  T:?36.7? ?C (Oral)? TMIN:?36.6? ?C (Oral)? TMAX:?36.8? ?C (Oral)? HR:?92(Peripheral)? RR:?17? BP:?119/79? SpO2:?94%?  Physical Exam  General:?obese, chronically ill appearing, appears much older than stated age, in no acute distress  Eye: PERRLA, EOMI, clear conjunctiva, eyelids normal  HEENT:?oropharynx without erythema/exudate, oropharynx and nasal mucosal surfaces moist  Neck: full range of motion, no thyromegaly or lymphadenopathy  Respiratory:?clear to auscultation bilaterally  Cardiovascular:?regular rate and rhythm  Gastrointestinal:?soft, non-tender, non-distended  Musculoskeletal:?right 2nd toe amputation  Neurologic: cranial nerves grossly intact, no signs of peripheral neurological deficit  Psychiatric: flat affect, disinterested  Patient Discharge Condition  stable  Discharge Disposition  home   Discharge Medication Reconciliation  Prescribed  acetaminophen-HYDROcodone (acetaminophen-hydrocodone 325 mg-5 mg oral tablet)?1 tab(s), Oral, q6hr  cefdinir (Omnicef 300 mg oral capsule)?300 mg, Oral, q12hr  Continue  gabapentin (gabapentin 300 mg oral capsule)?300 mg, Oral, TID  levothyroxine (levothyroxine 50 mcg (0.05 mg) oral tablet)?50 mcg, Oral, Daily  metFORMIN (metformin 500 mg oral tablet)?500 mg, Oral, BID  ondansetron (Zofran ODT 4 mg oral tablet, disintegrating)?4 mg, Oral, q6hr  pantoprazole (pantoprazole 20 mg ORAL EC-Tablet)?See Instructions  prasugrel (prasugrel 10 mg oral tablet)?See  Instructions  rivaroxaban (Xarelto 10mg Tablet)?10 mg, Oral, Daily  sertraline (Zoloft 100 mg oral tablet)?100 mg, Oral, Daily  Discontinue  amoxicillin (Amoxil 500 mg Cap)?1000 mg, Oral, BID  amoxicillin-clavulanate (Augmentin 875 mg-125 mg oral tablet)?1 tab(s), Oral, q12hr  atorvastatin (Lipitor 40 mg oral tablet)?40 mg, Oral, qPM  gabapentin (gabapentin 100 mg oral capsule)?100 mg, Oral, TID  levoFLOXacin (levoFLOXacin 500 mg oral tablet)?500 mg, Oral, Daily  linagliptin (linagliptin 5 mg oral tablet)?5 mg, Oral, Daily  metFORMIN (metFORMIN 1000 mg oral tablet)?1,000 mg, Oral, BID  predniSONE (prednisONE 20 mg oral tablet)?20 mg, Oral, BID  Education and Orders Provided  Urinary Tract Infection, Adult, Easy-to-Read  Pyelonephritis, Adult, Easy-to-Read  Discharge - 05/30/20 11:17:00 CDT, Home?  Discharge Activity - Activity as Tolerated?  Discharge Diet - Diabetic?  Follow up  Brianna Riddle  ????  Office will call w/follow up appointment  Follow-up with PCP in 3 to 5 days     [1]?Progress/SOAP Note; Bernadette ESCUDERO, Lorenzo CALI 05/29/2020 14:14 CDT

## 2022-05-04 NOTE — HISTORICAL [facility] CERNER
This is a historical note converted from [facility]. Formatting and pictures may have been removed.  Please reference [facility] for original formatting and attached multimedia. Consult reason: h/o SLE  ?   History of Present Illness  ?  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 She states about 3 days prior to admission she woke up noting a fluid filled blister on the anterior surface of her LLE. She then popped it and noted serous drainage. About 1 day ago she began running a fever with Tmax 102 which prompted her to come to ED. She was started on Zyvox (noted to have vanc allergy) and Zosyn for empiric coverage. Wound care RN evaluated patient and debrided wound who noted blistering to be superfical; MRI showed no osteo or abscess. Rheum consulted since patient has h/o SLE.  Today patient states she feels fine and denies any fevers since admission. She states she wants to go home. Denies any chest pain, SOB, N/V/D, LLE pain or edema.  ?  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  PSX:  Right Leg Stents: 01/16/18  WOUND VAC PLACED: 01/2017  Amputation of toe: 02/23/16  Hysterectomy: 07/01/13  Appendectomy;.  Cholecystectomy;.  Bilateral Great Toe Amputation  Ovarian Tumor Removal  Spleenectomy  Tonsillectomy  Left Fem-Pop Bypass  ?  FMH:  Mother: Multiple sclerosis  Grandfather: Colon cancer  ?  Allergies: heparin; vancomycin  ?  Social  Tobacco: Denies  ETOH abuse: Drinks occasionally  Illicit drug use: Admits to occasional marijuana use  ?   ROS  Negative except as stated in HPI  ?   Home medications  Home Medications (16) Active  atorvastatin 40 mg oral tablet?40 mg = 1 tab(s), Oral, Daily  Benlysta 200 mg/mL subcutaneous solution?200 mg, Subcutaneous, qWeek  ergocalciferol 50,000 intl units (1.25 mg) oral  capsule?50,000 IntUnit = 1 cap(s), Oral, qWeek  ferrous gluconate 324 mg (38 mg elemental iron) oral tablet?324 mg = 1 tab(s), Oral, BID  gabapentin 100 mg oral capsule?100 mg = 1 cap(s), Oral, TID  gabapentin 300 mg oral capsule?300 mg = 1 cap(s), Oral, Once a day (at bedtime)  hydroxychloroquine 200 mg oral tablet?200 mg = 1 tab(s), Oral, BID  ibuprofen 600 mg oral tablet?600 mg = 1 tab(s), PRN, Oral, q8hr  Myfortic 360 mg oral delayed release tablet?See Instructions  Pantoprazole 40 mg ORAL EC-Tablet?40 mg = 1 tab(s), Oral, Daily  prasugrel 10 mg oral tablet?10 mg = 1 tab(s), Oral, Daily  prednisONE 5 mg oral tablet?15 mg = 3 tab(s), Oral, Daily  SureComfort 70% topical pad?  Tradjenta 5 mg oral tablet?5 mg = 1 tab(s), Oral, Daily  Xarelto 10mg Tablet?10 mg = 1 tab(s), Oral, Daily  Zoloft 100 mg oral tablet?100 mg = 1 tab(s), Oral, Daily  ?  Physical exam  Vital Signs (last 24 hrs)_____??????????Last Charted___________  Temp Oral??????????????????????36.6 DegC ?(NOV 05 15:28)  Heart Rate Peripheral?????????????????72 bpm ?(NOV 05 15:28)  Resp Rate???????????????????????18 br/min ?(NOV 05 15:28)  SBP??????????????????????93 mmHg ?(NOV 05 15:28)  DBP??????????????????????L?57mmHg ?(NOV 05 15:28)  SpO2??????????????????????99 % ?(NOV 05 15:28)  Weight??????????????????????102 kg ?(NOV 05 03:11)  Height??????????????????????167 cm ?(NOV 05 03:11)  BMI??????????????????????36.57 ?(NOV 05 03:11)  ?  General: AAOx3, NAD, alert and cooperative  HEENT: PERRLA, EOMI. No oral ulcers. Malar rash present over R face  CVS: S1/S2 nml, RRR, no murmurs, rubs or gallops, no carotid bruits  Resp: CTA b/l, no wheezing  Skin: not jaundiced, warm, no rashes  Musculoskeletal: normal ROM. s/p great toe amputation b/l  Extremities: 2 cm appearing ulcer on anterior left shin without drainage with surrounding erythema. No edema, LLE nontender. Chronic erythema noted on b/l LUE, RUE, and RLE.  Neuro: no focal neurological deficits  ?    Labs  Labs Last 24 Hours?  ?Chemistry? Hematology/Coagulation?   Sodium Lvl: 140 mmol/L (11/05/20 05:04:00) WBC: 6.7 x10(3)/mcL (11/05/20 05:04:00)   Potassium Lvl: 3.7 mmol/L (11/05/20 05:04:00) RBC:?3.18 x10(6)/mcL?Low (11/05/20 05:04:00)   Chloride:?112 mmol/L?High (11/05/20 05:04:00) Hgb:?8.7 gm/dL?Low (11/05/20 05:04:00)   CO2: 22 mmol/L (11/05/20 05:04:00) Hct:?29.1 %?Low (11/05/20 05:04:00)   Calcium Lvl:?8.3 mg/dL?Low (11/05/20 05:04:00) Platelet: 234 x10(3)/mcL (11/05/20 05:04:00)   Magnesium Lvl: 1.76 mg/dL (11/05/20 05:04:00) MCV: 91.5 fL (11/05/20 05:04:00)   Glucose Lvl: 85 mg/dL (11/05/20 05:04:00) MCH: 27.4 pg (11/05/20 05:04:00)   BUN: 12 mg/dL (11/05/20 05:04:00) MCHC:?29.9 gm/dL?Low (11/05/20 05:04:00)   Creatinine: 0.69 mg/dL (11/05/20 05:04:00) RDW:?17.2 %?High (11/05/20 05:04:00)   Est Creat Clearance Ser: 91.43 mL/min (11/05/20 06:53:09) MPV: 10.4 fL (11/05/20 05:04:00)   BUN/Creat Ratio: 17 (11/04/20 21:30:00) Abs Neut: 3.31 x10(3)/mcL (11/05/20 05:04:00)   eGFR-AA: >105 (11/05/20 05:04:00) Segs Man: 53 % (11/05/20 05:04:00)   eGFR-BRUNO: 96 mL/min/1.73 m2 (11/05/20 05:04:00) Lymph Man: 38 % (11/05/20 05:04:00)   Bili Total: 0.4 mg/dL (11/05/20 05:04:00) Monocyte Man: 7 % (11/05/20 05:04:00)   Bili Direct: 0.3 mg/dL (11/05/20 05:04:00) Eos Man: 1 % (11/05/20 05:04:00)   Bili Indirect: 0.1 mg/dL (11/05/20 05:04:00) Basophil Man: 1 % (11/05/20 05:04:00)   AST:?54 unit/L?High (11/05/20 05:04:00) Hypochrom: 1+ (11/05/20 05:04:00)   ALT: 34 unit/L (11/05/20 05:04:00) Platelet Est: Adequate (11/05/20 05:04:00)   Alk Phos:?167 unit/L?High (11/05/20 05:04:00) Anisocyte: 1+ (11/05/20 05:04:00)   Total Protein:?6.3 gm/dL?Low (11/05/20 05:04:00) Poik: 1+ (11/05/20 05:04:00)   Albumin Lvl:?2.7 gm/dL?Low (11/05/20 05:04:00) Polychrom: 1+ (11/05/20 05:04:00)   Globulin:?3.6 gm/dL?High (11/05/20 05:04:00) RBC Morph: Abnormal (11/04/20 21:30:00)   A/G Ratio:?0.8 ratio?Low (11/05/20 05:04:00) Sed Rate:?75  mm/hr?High (11/04/20 21:30:00)   Phosphorus: 4.5 mg/dL (11/05/20 05:04:00)    Lactic Acid Lvl: 1.4 mmol/L (11/04/20 23:55:00)    CRP:?0.96 mg/dL?High (11/05/20 05:04:00)    U pH: 6.5 (11/05/20 05:00:00)    ?  ?  Radiology  Accession:?[id number]  Order:?XR Tibia-Fibula Left 2 Views  Report Dt/Tm:?11/05/2020 13:12  Report:?  EXAMINATION:  XR Tibia-Fibula Left 2 Views  ?  INDICATION:  Pain with wound  ?  Comparison: Radiograph dated 6/11/2020  ?  FINDINGS:  There is no acute fracture or malalignment identified. There are no  definite erosive bone changes. Surgical clips project over the medial  aspect of the knee and thigh. There are scattered vascular  calcifications.  ?  ?  IMPRESSION:  No acute abnormality identified.  ?  Accession:?[id number]  Order:?MRI Ext Lower Non Joint Left W/O Cont  Report Dt/Tm:?11/05/2020 09:53  Report:?  Comparison: Radiographs left leg used for comparison dated 11/4/2020.  ?  INDICATION: Nonhealing ulcer to the left leg.  ?  TECHNIQUE: Unenhanced, multiplanar, multisequence MR imaging of the  left leg was obtained.  ?  FINDINGS:  ?  Subcutaneous edema is present to the pretibial soft tissue. The ulcer  in question is not well demonstrated on todays study. No discrete  abscess identified.  ?  Subtle trabecular edema is present to the left tibial diaphysis and is  likely reactive. It does not appear significantly different than the  pattern seen to the right tibia. No periosteal reaction. The muscles  appear to be within normal limits. Neurovascular structures are normal  in course and caliber.  ?  IMPRESSION:  ?  Pretibial edema is present and nonspecific. This can be seen with  cellulitis but can also be seen with inflammatory arthropathy and  autoimmune disease. No MR evidence for osteomyelitis seen to the  underlying bone. No evidence for myositis or fasciitis.  ?  ?  Assessment and Plan  ?  1. SLE ([place] 1:2560, cutaneous lupus, hair loss,?hx of cytopenias, LAD, possibly vasculitis).  She had been on 20mg bid of prednisone for yrs w/out any DMARD. I restarted hydroxychloroquine 200mg bid in July. She does recall?trying low dose cellcept and having more infxns. Also thinks she had jaundice w/ Imuran. She has liver cirrhosis so unable to use MTX.  - Currently on Benlysta injections weekly, plaquenil 200mg BID, Myfortic 360mg BID, and prednisone 15mg daily  - Will DC Plaquenil (QT prolongation can occur with Zyvox), Benylsta, and Myfortic. Reducing prednisone to 10mg daily  - Can resume all medications once infectious process resolves  ?  2. Cellulitis of LLE  - Agree with Zyvox and Zosyn. Primary team to transition to PO regimen prior to DC  - Patient now afebrile with no leukocytosis  - Primary team to likely DC in AM. Agreed  - Please instruct patient to resume home medications once treatment for cellulitis is complete  ?  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 She had been on 20mg bid of prednisone for yrs w/out any DMARD, which is not improving things.  - Now on DMARD  - Will likely need coumadin. Patient refusing at this time. OK to continue DOAC  ?  4. 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 +B2 Glyco ab +  - Interestingly, her Anti-cardiolipin Ab is now negative with a new B2 glyco positive which is unusual  - Likely to require Coumadin instead of DOAC. Patient refusing  ?  ?  Thank you for the consult, additional recs per rheumatologist to follow   Pt seen, examined and plan developed in conjunction w/ Dr. Chacon. Pt known to me w/ SLE who is admitted w/ cellulitis. Hold myfortic, benlysta until infxn cleared - continue pred but at 10mg/d. Hold HCQ w/ Zosyn. Can resume meds once infxn resolved.  Maliha Smith DO, MPH

## 2022-05-04 NOTE — HISTORICAL [facility]
This is a historical note converted from [place]. Formatting and pictures may have been removed.  Please reference [place] for original formatting and attached multimedia. Admit and Discharge Dates  Admit Date: 11/04/2020  Discharge Date: 11/06/2020  Physicians  Attending Physician - Mathieu ESCUDERO, Merissa AZAR  Admitting Physician - Mathieu ESCUDERO, Merissa AZAR  Consulting Physician - Maliha Smith DO  Primary Care Physician - Janessa ESCUDERO, Brianna DANIEL  Discharge Diagnosis  Leg ulcer, left?L97.929  ?Left lower extremity cellulitis  SLE  Normocytic anemia  Severe PVD  Anxiety/depression  T2DM with neuropathy  Vitamin D deficiency  ?  ?  ?  Surgical Procedures  No procedures recorded for this visit.  Admission Information  51yo female with a past medical history of SLE, severe PVD, type 2 diabetes, cirrhosis, CAD status post CABG, history of sepsis secondary to Pasteurella of the right leg (8.2020) who presented to the ED with left lower extremity abscess. ?Patient reports that she woke up a 3 days ago with blisters on her left leg; they eventually popped open and started draining serous fluid. ?She started to develop 8/10 needlelike pain on the site and increased redness, which was not relieved with Tylenol. ?She also admitted to 101.1 degree fever during the afternoon on admission day as well as chills. ?She denied shortness of breath, chest pain, extremity swelling, recent trauma or bite/scratch.  ?  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 ?She also exhibited an SLE flare that presented itself as a rash to her chest, back, and right side of her face. ?She was admitted with left leg cellulitis and SLE flare. ?She was  subsequently placed on Zosyn and ?Zyvox. ?Wound care was consulted to evaluate the wound and it was cleaned, in addition to mechanically removing the top layer of ?scaring. ? The wound ?appearance was improved. ?Rheumatology was also consulted on the case. ?They discontinued her immunosuppressant medications while she is having ?this infection. ?Patient exhibited no leukocytosis and has been afebrile during her admission stay here. ?Patient would also like to go home. ?Rheumatologist recommended that she needed Coumadin however patient refuses at this time. ?He said that it was okay to use the DOAC. ?He discontinued the Plaquenil (due to QT prolonged prolongation which can occur with the Zyvox), the Benlysta, and Myfortic. ?The prednisone was to decreased to 10 mg daily during this infection. On 11/ 6 the CRP was 0.969 UA was negative.? In regards to the normocytic anemia patient continued her home ferrous gluconate 324 mg twice daily.? For the severe PVD patient was to continue her present xerogel 10 mg daily, Xarelto 10 mg daily.? She is also to continue her home atorvastatin 40 mg daily.? In regards to her anxiety patient can resume her Zoloft 200 mg daily.?After consideration, in agreements with rheumatology, the primary team ?will discharge the patient today on clindamycin 600 mg every 8 hours x10 days. She is to continue her Bactroban dressing changes with Vaseline gauze, and Ace bandages daily and as needed. ?Patient has been instructed on the dressing changes and has been given the necessary supplies. ?She also needs to follow-up with her rheumatologist ?in 1 to 2 weeks as well as her primary care physician in 1 to 2 weeks (see follow up).  ?  ?  ?  Significant Findings  ?  Reason For Exam  Abscess  ?  Radiology Report  Comparison: Radiographs left leg used for comparison dated 11/4/2020.  ?  INDICATION: Nonhealing ulcer to the left leg.  ?  TECHNIQUE: Unenhanced, multiplanar, multisequence MR imaging of  the  left leg was obtained.  ?  FINDINGS:  ?  Subcutaneous edema is present to the pretibial soft tissue. The ulcer  in question is not well demonstrated on todays study. No discrete  abscess identified.  ?  Subtle trabecular edema is present to the left tibial diaphysis and is  likely reactive. It does not appear significantly different than the  pattern seen to the right tibia. No periosteal reaction. The muscles  appear to be within normal limits. Neurovascular structures are normal  in course and caliber.  ?  IMPRESSION:  ?  Pretibial edema is present and nonspecific. This can be seen with  cellulitis but can also be seen with inflammatory arthropathy and  autoimmune disease. No MR evidence for osteomyelitis seen to the  underlying bone. No evidence for myositis or fasciitis.  ?  Signature Line  Electronically Signed By: Ting ESCUDERO, Elvin Dahl  Date/Time Signed: 11/05/2020 10:41  ?  ?  Reason For Exam  pain to anterior shin area, consistant with developing wound from ruptured blister;Other (please specify)  ?  Radiology Report  EXAMINATION:  XR Tibia-Fibula Left 2 Views  ?  INDICATION:  Pain with wound  ?  Comparison: Radiograph dated 6/11/2020  ?  FINDINGS:  There is no acute fracture or malalignment identified. There are no  definite erosive bone changes. Surgical clips project over the medial  aspect of the knee and thigh. There are scattered vascular  calcifications.  ?  ?  IMPRESSION:  No acute abnormality identified.  ?  Signature Line  Electronically Signed By: Alejandrina Goodman MD  Date/Time Signed: 11/05/2020 13:13  ?  ?  Objective  Vitals & Measurements  T:?36.5? ?C (Oral)? TMIN:?36.5? ?C (Oral)? TMAX:?37.0? ?C (Oral)? HR:?79(Peripheral)? RR:?18? BP:?114/70? SpO2:?96%?  Physical Exam  General:?NAD  Respiratory:?CTA, no w/r/c  Cardiovascular:?RRR, no murmurs or gallops; no carotid bruit, 1+ pulses radial and thready b/l DP; no lower extremity edema b/l  Integumentary:?very warm, tender ?and  guarding of the lower extremity. ?Area with without deep demarcation noted around the center of the wound; left lower extremity with mild erythema throughout anterior left leg, mild yellowish ?and serosangounous drainage on the vaseline gauze noted. Continues to have the violaceous maculopapular rash on right cheek, back chin and chest. ? The right first and second toe amputated and there is a surgical scar in right lower extremity calf.?  ?  Patient Discharge Condition  stable  Discharge Disposition  Home after she eats lunch   Discharge Medication Reconciliation  Discharge Med Rec is not complete  Education and Orders Provided  Antibiotic Medicine, Adult  How to Change Your Dressing, Easy-to-Read  Discharge - 11/06/20 10:37:00 CST, Home, Discharge Home after pt eats lunch and has no nausea or vomiting?  Follow up  Report to Emergency Department if symptoms return or worsen  Maliha Smith, within 1 to 2 weeks  ????f/up with Rheumatologist  Brianna Riddle, within 1 to 2 weeks  ????F/up with primary for post wolff with PCP  Call office to Schedule Appointment  Call office to Schedule Appointment  Car Seat Challenge  No Qualifying Data

## 2022-05-04 NOTE — HISTORICAL [facility] CERNER
This is a historical note converted from [employer]. Formatting and pictures may have been removed.  Please reference [employer]ner for original formatting and attached multimedia. Chief Complaint  Bilateral flank pain x 1 week with N/V and dark urine.  History of Present Illness  Ms. Ricks is a 50 year old female with a medical history of severe PAD s/p?SMA stent/B fem-pop bypass/B great toe and R 2nd toe amputations, CAD, COPD, NIDDM2, Lupus, Hypothyroidism, RA, anxiety/depression, and migraines. She presented to [facility] ER with complaints of bilateral flank pain, L > R,?over the last week with intermittent nausea/vomiting, subjective fever and dark, foul smelling urine. She was seen in the ED on 5/25/20 with bilateral flank pain and hematuria for several days. CT abd/pelvis negative for acute concerns. UA remarkable for UTI. She was discharged in stable condition from the ED; prescribed Brantley, Augmentin BID x 14 days, and Zofran.?Compliant with antibiotics as prescribed.  Of note, she was admitted to our service from 4/23/20-5/1/20 for RLE cellulitis, E Coli UTI, Right Fem-Pop Bypass Graft Occlusion, REANNA, and symptomatic bradycardia. ID was consulted; she received Rocephin IV x 4 doses during admission and was transitioned to Cefdinir BID x 10 days upon discharge.?  Initial ER VS: /96, , respirations 14, oral temp 38.1, and SPO2 95% on RA.? CO2 19, BUN/Cr 6.8/0.76, lactic acid 2.2 with repeat 1.7, CBC unremarkable. UA with WBC 40, 1+ leuko, nitrite (-), no bacteria, 3+ blood and 1-2 RBC. No diagnostic imaging was performed while in the ER. She was given Norco, Rocephin 2gm, Toradol 30mg IV, Zofran 4 IV, and 1L NS while in the ER. She was admitted to the hospitalist services for further evaluation and management.  Flank pain improved since ED.  Review of Systems  Except as documented, all other systems reviewed and negative.  Physical Exam  Vitals & Measurements  T:?37? ?C (Oral)? TMIN:?37? ?C (Oral)? TMAX:?38.1?  ?C (Oral)? HR:?98(Peripheral)? RR:?20? BP:?169/78? SpO2:?89%? WT:?100?kg?  General: Appears comfortable, no acute distress.  Cognition and speech:?Oriented, speech clear and coherent.  HEENT:?Normocephalic, normal hearing, oral mucosa is moist.  Neck:?No JVD, trachea at?midline, supple.  Respiratory:?Lungs CTA.?No accessory muscle use. ?Breath sounds are equal.  Cardiovascular:?Regular rhythm. Normal S1/S2.? No pedal edema.  Gastrointestinal:?Normoactive bowel sound, soft and non-tender.  Genitourinary:No suprapubic tenderness or CVA tenderness.  Integumentary:?Warm, dry, intact.  Musculoskeletal:?Normal strength, no tenderness, no swelling.  Skin:?Warm and dry, no rashes or lesions. BLE with circumferential erythema and chronic skin changes.  Neuro:?AAOx3, no focal neurological deficit, normal sensory.  Psych:?Cooperative. Appropriate mood and affect.  ?  Assessment:  SIRS - likely s/t pyelonephritis  Pyelonephritis - POA;?HX: E Coli UTI 4 weeks ago  Bilateral Flank Pain s/t above  Hyperglycemia; HX: NIDDM2  Severe PAD s/p?SMA stent/B fem-pop bypass/B great toe and R 2nd toe amputations on Xarelto  HX: CAD, COPD,?Lupus, Hypothyroidism, Anxiety/Depression  ?   Plan:?  - No imaging tonight; UA reviewed  - CT Abd/Pelvis WO on 5/25/20 - negative for acute concerns  - Blood Cultures x 2 pending, Urine Culture pending  - Urine Culture 4/23 - E Coli & 5/25 - Klebsiella, both sensitive to ceftriaxone  - Rocephin 1 gm IV daily - Day #1  - NS @ 100ml/hr  - Accu-Cheks AC & HS with SSI#2  - RESUME HOME prasugrel,?Xarelto  - Repeat labs in am  ?   Source of history: Self. Medical record.?  Present at bedside: None.?  History limitation: None.  Provider information: PCP:?FREDY Riddle MD  ?   Code status: Full code  DVT prophylaxis:?RESUME HOME Xarelto  ?  IYulia FNP-C, reviewed and discussed the case with Dr. Hardik Carmona.  ?  I, Hardik Carmona MD, assumed care of this patient at the time of this addendum and  assisted with the composition of the above assessment and plan. For this patient encounter, I reviewed the NP or PA documentation, treatment plan, and medical decision making; and I had face-to-face time with this patient. ?Labs and imaging were reviewed and I agree with history, physical and medical decision making as detailed above.??  ?  50-year-old female presents with nausea,?flank pain?bilaterally, and urinary tract infection. ?Recently evaluated for urinary tract infection,?in April?and earlier this month with urine cultures growing Klebsiella and E. coli. ?She is placed on ceftriaxone as both are sensitive.? Can discharge on oral antibiotic when tolerating oral intake which should be soon.  ?   Problem List/Past Medical History  Severe PAD s/p?SMA stent/B fem-pop bypass/B great toe and R 2nd toe amputations on Xarelto  CAD  COPD  NIDDM2 with neuropathy  Lupus  Hypothyroidism  GERD  Rheumatoid arthritis  Migraines  Anxiety/depression  Former tobacco use  Procedure/Surgical History  Right Leg Stents (01/16/2018)  Incision & Drainage Lower Extremity (Right) (01/09/2017)  Bypass Femoral Popliteal (Right) (11/07/2016)  Amputation of toe (02/23/2016)  Bronchoscopy Fluoroscopy (.) (09/23/2015)  Bypass Femoral Popliteal (Right) (07/28/2015)  Hysterectomy (07/01/2013)  Appendectomy;  Bilateral Great Toe Amputation  Cholecystectomy;  Left Fem-Pop Bypass  Ovarian Tumor Removal  Spleenectomy  Tonsillectomy   Medications  Inpatient  aspirin 81 mg oral tablet, CHEWABLE, 324 mg= 4 tab(s), Oral, Once  cefTRIAXone  Dextrose 10% in Water intravenous solution, 125 mL, IV, As Directed, PRN  Dextrose 10% in Water intravenous solution, 125 mL, IV, As Directed, PRN  Dextrose 10% in Water intravenous solution, 125 mL, IV, Once, PRN  Dextrose 10% in Water intravenous solution, 250 mL, IV, As Directed, PRN  glucagon, 1 mg= 1 EA, IM, q10min, PRN  glucagon, 1 mg= 1 EA, IM, q10min, PRN  insulin lispro, 2-14 units, Subcutaneous, As  Directed, PRN  Normal Saline (0.9% NS) IV 1,000 mL, 1000 mL, IV  Phenergan, 12.5 mg= 0.5 mL, IM, q4hr, PRN  Zofran, 4 mg= 2 mL, IV Push, q4hr, PRN  Zofran  Zofran 2 mg/mL injectable solution, 4 mg= 2 mL, IV Push, Once  Home  acetaminophen-HYDROcodone, Oral, Once  Augmentin 875 mg-125 mg oral tablet, 1 tab(s), Oral, q12hr  gabapentin 100 mg oral capsule, 100 mg= 1 cap(s), Oral, TID  gabapentin 300 mg oral capsule, 300 mg= 1 cap(s), Oral, TID, 6 refills  levoFLOXacin 500 mg oral tablet, 500 mg= 1 tab(s), Oral, Daily  levothyroxine 50 mcg (0.05 mg) oral tablet, 50 mcg= 1 tab(s), Oral, Daily, 2 refills  linagliptin 5 mg oral tablet, 5 mg= 1 tab(s), Oral, Daily, 6 refills  Lipitor 40 mg oral tablet, 40 mg= 1 tab(s), Oral, qPM, 6 refills  metFORMIN 1000 mg oral tablet, 1000 mg= 1 tab(s), Oral, BID, 6 refills  metformin 500 mg oral tablet, 500 mg= 1 tab(s), Oral, BID  Norco 5 mg-325 mg oral tablet, 1 tab(s), Oral, q6hr, PRN  pantoprazole 20 mg ORAL EC-Tablet, See Instructions, 2 refills  prasugrel 10 mg oral tablet, See Instructions  prednisONE 20 mg oral tablet, 20 mg= 1 tab(s), Oral, BID  Xarelto 10mg Tablet, 10 mg= 1 tab(s), Oral, Daily  Zofran ODT 4 mg oral tablet, disintegrating, 4 mg= 1 tab(s), Oral, q6hr  Zoloft 100 mg oral tablet, 100 mg= 1 tab(s), Oral, Daily, 2 refills  Allergies  heparin?(HIT (Heparin induced thrombocytopenia) antibody)  vancomycin?(Hives, Blotchy)  Social History  Denies EtOH use.  Current marijuana use; occasionally, unable to quantify.  Former tobacco use; quit years ago, unable to quantify.  Family History  Colon cancer: Grandfather.  Multiple sclerosis: Mother.  Lab Results  Labs Last 24 Hours?  ?Chemistry? Hematology/Coagulation?   Sodium Lvl: 137 mmol/L (05/28/20 15:18:00) WBC: 9.5 x10(3)/mcL (05/28/20 15:18:00)   Potassium Lvl: 4.1 mmol/L (05/28/20 15:18:00) RBC: 4.51 x10(6)/mcL (05/28/20 15:18:00)   Chloride: 106 mmol/L (05/28/20 15:18:00) Hgb: 13 gm/dL (05/28/20 15:18:00)   CO2:?19  mmol/L?Low (05/28/20 15:18:00) Hct: 42.3 % (05/28/20 15:18:00)   Calcium Lvl:?8.1 mg/dL?Low (05/28/20 15:18:00) Platelet: 238 x10(3)/mcL (05/28/20 15:18:00)   Glucose Lvl:?135 mg/dL?High (05/28/20 15:18:00) MCV: 93.8 fL (05/28/20 15:18:00)   BUN:?6.8 mg/dL?Low (05/28/20 15:18:00) MCH: 28.8 pg (05/28/20 15:18:00)   Creatinine: 0.76 mg/dL (05/28/20 15:18:00) MCHC:?30.7 gm/dL?Low (05/28/20 15:18:00)   eGFR-AA: >60 (05/28/20 15:18:00) RDW: 15.4 % (05/28/20 15:18:00)   eGFR-RBUNO: >60 (05/28/20 15:18:00) MPV: 9.8 fL (05/28/20 15:18:00)   Bili Total: 0.5 mg/dL (05/28/20 15:18:00) Abs Neut: 6.58 x10(3)/mcL (05/28/20 15:18:00)   Bili Direct: 0.1 mg/dL (05/28/20 15:18:00) Neutro Auto: 69 % (05/28/20 15:18:00)   Bili Indirect: 0.4 mg/dL (05/28/20 15:18:00) Lymph Auto: 23 % (05/28/20 15:18:00)   AST:?42 unit/L?High (05/28/20 15:18:00) Mono Auto: 4 % (05/28/20 15:18:00)   ALT: 27 unit/L (05/28/20 15:18:00) Eos Auto: 2 % (05/28/20 15:18:00)   Alk Phos: 119 unit/L (05/28/20 15:18:00) Abs Eos: 0.2 x10(3)/mcL (05/28/20 15:18:00)   Total Protein: 6.5 gm/dL (05/28/20 15:18:00) Basophil Auto: 1 % (05/28/20 15:18:00)   Albumin Lvl:?2.8 gm/dL?Low (05/28/20 15:18:00) Abs Neutro: 6.58 x10(3)/mcL (05/28/20 15:18:00)   Globulin:?3.7 gm/dL?High (05/28/20 15:18:00) Abs Lymph: 2.2 x10(3)/mcL (05/28/20 15:18:00)   A/G Ratio:?0.8 ratio?Low (05/28/20 15:18:00) Abs Mono: 0.4 x10(3)/mcL (05/28/20 15:18:00)   Lactic Acid Lvl: 1.7 mmol/L (05/28/20 19:01:00) Abs Baso: 0.1 x10(3)/mcL (05/28/20 15:18:00)    IG%: 1 (05/28/20 15:18:00)    IG#: 0 (05/28/20 15:18:00)   Diagnostic Results  None done on 5/28/20  ?   CT Abd/Pelvis WO 5/25/20:?  Impression:  1. No acute abnormality of the abdomen and pelvis.  2. Diffuse hepatic steatosis and heterogeneous perfusion of the right  lobe of the liver.  3. Diverticulosis without diverticulitis.  4. Superior mesenteric and bilateral iliac arterial stents. Patency of  these cannot be assessed without IV contrast.  ?

## 2022-05-05 ENCOUNTER — TELEPHONE (OUTPATIENT)
Dept: FAMILY MEDICINE | Facility: CLINIC | Age: 52
End: 2022-05-05

## 2022-05-05 ENCOUNTER — OFFICE VISIT (OUTPATIENT)
Dept: FAMILY MEDICINE | Facility: CLINIC | Age: 52
End: 2022-05-05
Payer: MEDICARE

## 2022-05-05 VITALS
SYSTOLIC BLOOD PRESSURE: 130 MMHG | WEIGHT: 234.5 LBS | OXYGEN SATURATION: 98 % | RESPIRATION RATE: 18 BRPM | TEMPERATURE: 98 F | HEIGHT: 71 IN | HEART RATE: 70 BPM | BODY MASS INDEX: 32.83 KG/M2 | DIASTOLIC BLOOD PRESSURE: 82 MMHG

## 2022-05-05 DIAGNOSIS — R45.4 IRRITABLE MOOD: ICD-10-CM

## 2022-05-05 DIAGNOSIS — G89.29 OTHER CHRONIC PAIN: ICD-10-CM

## 2022-05-05 DIAGNOSIS — I73.9 PERIPHERAL VASCULAR DISEASE: ICD-10-CM

## 2022-05-05 DIAGNOSIS — D68.62 LUPUS ANTICOAGULANT DISORDER: ICD-10-CM

## 2022-05-05 DIAGNOSIS — Z74.09 IMPAIRED MOBILITY: ICD-10-CM

## 2022-05-05 DIAGNOSIS — E11.29 TYPE 2 DIABETES MELLITUS WITH OTHER DIABETIC KIDNEY COMPLICATION, WITHOUT LONG-TERM CURRENT USE OF INSULIN: ICD-10-CM

## 2022-05-05 DIAGNOSIS — E78.5 HYPERLIPIDEMIA, UNSPECIFIED HYPERLIPIDEMIA TYPE: ICD-10-CM

## 2022-05-05 DIAGNOSIS — I50.9 CONGESTIVE HEART FAILURE, UNSPECIFIED HF CHRONICITY, UNSPECIFIED HEART FAILURE TYPE: ICD-10-CM

## 2022-05-05 DIAGNOSIS — I73.9 PERIPHERAL ARTERY DISEASE: Primary | ICD-10-CM

## 2022-05-05 DIAGNOSIS — K21.9 GASTROESOPHAGEAL REFLUX DISEASE, UNSPECIFIED WHETHER ESOPHAGITIS PRESENT: ICD-10-CM

## 2022-05-05 DIAGNOSIS — M06.00 SERONEGATIVE RHEUMATOID ARTHRITIS: ICD-10-CM

## 2022-05-05 DIAGNOSIS — G62.9 NEUROPATHY: ICD-10-CM

## 2022-05-05 DIAGNOSIS — E03.9 HYPOTHYROIDISM, UNSPECIFIED TYPE: ICD-10-CM

## 2022-05-05 PROBLEM — E11.9 DIABETES MELLITUS: Status: ACTIVE | Noted: 2022-05-05

## 2022-05-05 PROBLEM — I77.89 LUPUS VASCULITIS: Status: ACTIVE | Noted: 2022-05-05

## 2022-05-05 PROBLEM — M32.19 LUPUS VASCULITIS: Status: ACTIVE | Noted: 2022-05-05

## 2022-05-05 PROBLEM — K74.60 HEPATIC CIRRHOSIS: Status: ACTIVE | Noted: 2022-05-05

## 2022-05-05 PROCEDURE — 99214 OFFICE O/P EST MOD 30 MIN: CPT | Mod: ,,, | Performed by: FAMILY MEDICINE

## 2022-05-05 PROCEDURE — 3079F PR MOST RECENT DIASTOLIC BLOOD PRESSURE 80-89 MM HG: ICD-10-PCS | Mod: CPTII,,, | Performed by: FAMILY MEDICINE

## 2022-05-05 PROCEDURE — 3008F PR BODY MASS INDEX (BMI) DOCUMENTED: ICD-10-PCS | Mod: CPTII,,, | Performed by: FAMILY MEDICINE

## 2022-05-05 PROCEDURE — 3079F DIAST BP 80-89 MM HG: CPT | Mod: CPTII,,, | Performed by: FAMILY MEDICINE

## 2022-05-05 PROCEDURE — 99214 PR OFFICE/OUTPT VISIT, EST, LEVL IV, 30-39 MIN: ICD-10-PCS | Mod: ,,, | Performed by: FAMILY MEDICINE

## 2022-05-05 PROCEDURE — 3075F PR MOST RECENT SYSTOLIC BLOOD PRESS GE 130-139MM HG: ICD-10-PCS | Mod: CPTII,,, | Performed by: FAMILY MEDICINE

## 2022-05-05 PROCEDURE — 3075F SYST BP GE 130 - 139MM HG: CPT | Mod: CPTII,,, | Performed by: FAMILY MEDICINE

## 2022-05-05 PROCEDURE — 3008F BODY MASS INDEX DOCD: CPT | Mod: CPTII,,, | Performed by: FAMILY MEDICINE

## 2022-05-05 RX ORDER — HYDROCODONE BITARTRATE AND ACETAMINOPHEN 7.5; 325 MG/1; MG/1
1 TABLET ORAL EVERY 8 HOURS PRN
COMMUNITY
Start: 2022-05-03 | End: 2023-09-05 | Stop reason: SDUPTHER

## 2022-05-05 RX ORDER — GABAPENTIN 300 MG/1
300 CAPSULE ORAL
COMMUNITY
Start: 2021-08-24 | End: 2022-07-18 | Stop reason: SDUPTHER

## 2022-05-05 RX ORDER — BELIMUMAB 200 MG/ML
SOLUTION SUBCUTANEOUS
COMMUNITY
Start: 2021-11-29 | End: 2022-07-21 | Stop reason: SDUPTHER

## 2022-05-05 RX ORDER — PREDNISONE 1 MG/1
TABLET ORAL
COMMUNITY
Start: 2021-11-29 | End: 2022-12-28

## 2022-05-05 RX ORDER — PRASUGREL 10 MG/1
10 TABLET, FILM COATED ORAL DAILY
COMMUNITY
Start: 2022-04-28 | End: 2022-06-28 | Stop reason: SDUPTHER

## 2022-05-05 RX ORDER — SERTRALINE HYDROCHLORIDE 100 MG/1
100 TABLET, FILM COATED ORAL
COMMUNITY
Start: 2021-08-06 | End: 2022-12-21 | Stop reason: SDUPTHER

## 2022-05-05 RX ORDER — BARICITINIB 2 MG/1
2 TABLET, FILM COATED ORAL
COMMUNITY
Start: 2021-11-29 | End: 2022-07-21 | Stop reason: SDUPTHER

## 2022-05-05 RX ORDER — HYDROXYCHLOROQUINE SULFATE 200 MG/1
200 TABLET, FILM COATED ORAL 2 TIMES DAILY
COMMUNITY
Start: 2021-11-29 | End: 2023-05-05 | Stop reason: SDUPTHER

## 2022-05-05 RX ORDER — DIPHENHYDRAMINE HCL 25 MG
25 CAPSULE ORAL
Status: ON HOLD | COMMUNITY
Start: 2021-06-23 | End: 2022-08-20 | Stop reason: HOSPADM

## 2022-05-05 NOTE — ASSESSMENT & PLAN NOTE
We received paperwork for mobility eval however patient states she is not interested in chairs they offer as it would be too heavy and would require an attachment to a car to use.  She declines mobility eval today.  States will contact DME company to see if they offer a lighter weight chair and reschedule mobility exam if needed. She was given the paperwork.

## 2022-05-05 NOTE — TELEPHONE ENCOUNTER
See previous note from today 5/5/22      ----- Message from Saloni Patterson sent at 5/5/2022  9:46 AM CDT -----  Regarding: Orders  Type:  Needs Medical Advice    Who Called: [facility]  Symptoms (please be specific):    How long has patient had these symptoms:    Pharmacy name and phone #:    Would the patient rather a call back or a response via [facility]?   Best Call Back Number: [phone]  Additional Information: Needs to know if order was received for power wheelchair and to discuss it with MA

## 2022-05-05 NOTE — PROGRESS NOTES
Subjective:        Patient ID: Vanesa Ricks is a 52 y.o. female.    Chief Complaint: Follow-up (Patient in office today to see about getting a power chair for mobility.)      Patient presents for mobility evaluation however after paperwork received was reviewed with patient, she stated she did not want any of the chairs offered as they would be too heavy.  She is specifically interested in a zinger mobility chair.  She decided to take the paperwork and will contact them to see if they offer this or if she needs to get with another company and get new paperwork    She has a history of chronic pain: seeing pain mgmt in - Dr. Stockton    She has a history of RA, lupus (dx at age 14), lupus vasculitis.  She was seeing Dr. Smith, rheumatology.  Her lov was 11/29/21.  She is on plaquenil, benlysta, olumiant, xarelto and prednisone.  She has not had follow up since Dr. Smith has moved. Dr. Smith referred her to [facility] and she saw dr. Cabrales on 2/21/22.  He advised to continue lifelong xarelto and follow up in 6 months    She has a history of lupus vasculitis, PAD/PVD, CHF, HLD.  She has seen Dr. Espinoza for mesenteric stenting in 8/2019.  Had had 4 fem pops (2 on each leg).  Her cardiologist is Dr. Villa. She has had partial left great toe amputation and right great and second toes amputated. She has neuropathy and is also on gabapentin.    She has history of diabetes likely due to long term steroid use. Eye exams with dr. Hampton.  Foot exam today.     She has gerd and is on protonix    On zoloft for her mood    Has history of hypothyroid and is on synthroid.     Follow-up  Associated symptoms include arthralgias and myalgias.     Review of Systems   Constitutional: Negative.    HENT: Negative.    Eyes: Negative.    Respiratory: Negative.    Cardiovascular: Negative.    Gastrointestinal: Negative.    Genitourinary: Negative.    Musculoskeletal: Positive for arthralgias and myalgias.   Neurological: Negative.     Psychiatric/Behavioral: Negative.        Review of patient's allergies indicates:   Allergen Reactions    Heparin Anaphylaxis    Vancomycin      Other reaction(s): Blotchy, Hives      Vitals:    05/05/22 1044   BP: 130/82   Pulse: 70   Resp: 18   Temp: 97.9 °F (36.6 °C)      Social History     Socioeconomic History    Marital status: Single   Tobacco Use    Smoking status: Current Every Day Smoker    Smokeless tobacco: Never Used    Tobacco comment: patient reports she smokes marijuana   Substance and Sexual Activity    Alcohol use: Yes     Comment: socially    Drug use: Never    Sexual activity: Yes      History reviewed. No pertinent family history.       Objective:     Physical Exam  Vitals and nursing note reviewed.   Constitutional:       Appearance: Normal appearance. She is normal weight.   HENT:      Head: Normocephalic and atraumatic.      Nose: Nose normal.      Mouth/Throat:      Mouth: Mucous membranes are moist.      Pharynx: Oropharynx is clear.   Eyes:      Extraocular Movements: Extraocular movements intact.   Cardiovascular:      Rate and Rhythm: Normal rate and regular rhythm.      Pulses: Normal pulses.      Heart sounds: Normal heart sounds.   Pulmonary:      Effort: Pulmonary effort is normal.      Breath sounds: Normal breath sounds.   Musculoskeletal:         General: Normal range of motion.      Cervical back: Normal range of motion.        Feet:    Feet:      Comments: Red= amputation  X= decreased sensation  Skin:     General: Skin is warm and dry.   Neurological:      General: No focal deficit present.      Mental Status: She is alert and oriented to person, place, and time. Mental status is at baseline.   Psychiatric:         Mood and Affect: Mood normal.       Current Outpatient Medications on File Prior to Visit   Medication Sig Dispense Refill    baricitinib (OLUMIANT) 2 mg tablet Take 2 mg by mouth.      belimumab (BENLYSTA) 200 mg/mL Syrg   See Instructions, INJECT 200  MG SUBCUTANEOUSLY EVERY WEEK, # 4 syringe(s), 5 Refill(s), Pharmacy: [facility] #[id number], 175, cm, Height/Length Dosing, 11/29/21 7:50:00 CST, 109, kg, Weight Dosing, 11/29/21 7:50:00 CST      diphenhydrAMINE (BENADRYL) 25 mg capsule Take 25 mg by mouth.      gabapentin (NEURONTIN) 300 MG capsule Take 300 mg by mouth.      hydrOXYchloroQUINE (PLAQUENIL) 200 mg tablet Take 200 mg by mouth 2 (two) times daily.      prasugreL (EFFIENT) 10 mg Tab Take 10 mg by mouth once daily.      predniSONE (DELTASONE) 1 MG tablet TAKE 5 MG (5 TABLETS) BY MOUTH EVERY MORNING WITH FOOD, THEN TAPER FROM 5 MG PER DAY, BY 1 MG EVERY MONTH.      rivaroxaban (XARELTO) 10 mg Tab Take 10 mg by mouth.      sertraline (ZOLOFT) 100 MG tablet Take 100 mg by mouth.      HYDROcodone-acetaminophen (NORCO) 7.5-325 mg per tablet Take 1 tablet by mouth every 8 (eight) hours as needed.       No current facility-administered medications on file prior to visit.     Health Maintenance   Topic Date Due    Hepatitis C Screening  Never done    Lipid Panel  Never done    TETANUS VACCINE  Never done    Mammogram  09/17/2022      No results found for this or any previous visit.       Assessment & Plan:     Problem List Items Addressed This Visit        Neuro    Neuropathy     On gabapentin           Chronic pain     On norco 7.5mg TID per pain management, Dr. Stockton, in . She sees him monthly.               Psychiatric    Irritable mood  Well controlled on zoloft       Cardiac/Vascular    Hyperlipidemia     On lipitor. Keep appts with cardiology- dr. mckeon           Congestive heart failure     Keep appts with cards           Peripheral artery disease - Primary  On asa 81, cilostazol, and lifelong xarelto       Immunology/Multi System    Seronegative rheumatoid arthritis     Was previously seeing Dr. Smith at [facility].  LOV 11/29/2021.  Was to follow up in 2/2022 but Dr. Smith has moved and has not had a follow up appointment with new MD.                 Hematology    Lupus anticoagulant disorder     Keep appts with Rheum              Endocrine    Hypothyroidism     Stable on synthroid 50mcg daily.  Last tsh 8/2021 wnl           Diabetes mellitus     Likely due to long term steroid use    a1c 6.0 6/2020  Foot exam today  Urine micro- will order for wellness  Eye exam     Has decreased steroid use per rheum.  Is not on diabetic meds.                   GI    Gastroesophageal reflux disease     Stable on protonix              Other    Impaired mobility     We received paperwork for mobility eval however patient states she is not interested in chairs they offer as it would be too heavy and would require an attachment to a car to use. She relies on public transportation. She is asking for a [employer] mobility chair.   She declines mobility eval today.  States will contact [employer] company to see if they offer a lighter weight chair and reschedule mobility exam if needed. She was given the paperwork.                  Follow up in about 4 months (around 9/5/2022) for wellness.

## 2022-05-05 NOTE — ASSESSMENT & PLAN NOTE
Likely due to long term steroid use    a1c 6.0 6/2020  Foot exam today  Urine micro- will order for wellness  Eye exam - with dr. lorenzo    Has decreased steroid use per rheum.  Is not on diabetic meds.

## 2022-05-05 NOTE — TELEPHONE ENCOUNTER
"I spoke with tatyana. She advised when we fill out the paper work, to put "please see attached documents" under reason for visit. I notified Dr. helton      ----- Message from Declan Pandey sent at 5/5/2022  9:39 AM CDT -----  Tatyana with [employer] was looking to confirm it 11 pages of reports were received for this patient. Callback [phone] x [phone], The patient has a 10:00      "

## 2022-05-05 NOTE — ASSESSMENT & PLAN NOTE
Was previously seeing Dr. Smith at [facility].  LOV 11/29/2021.  Was to follow up in 2/2022 but Dr. Smith has moved and has not had a follow up appointment with new MD.

## 2022-05-11 ENCOUNTER — TELEPHONE (OUTPATIENT)
Dept: FAMILY MEDICINE | Facility: CLINIC | Age: 52
End: 2022-05-11
Payer: MEDICARE

## 2022-05-11 NOTE — TELEPHONE ENCOUNTER
Trista with [employer] called asking for LOV note to assist in getting patient a new power chair. I advised her that the patient stated during the visit, that the chairs offered in the paperwork that was faxed were not what she was interested in, and that she needed to call [facility] to go over other options so that we can receive new paperwork to order. Patient had stated she would call and schedule a new appt.  Trista verbalized understanding and requested the LOV note still be faxed so they have it on file, which I did fax to her, and she said they would be contacting the patient to review other options for her chair.

## 2022-06-10 ENCOUNTER — HOSPITAL ENCOUNTER (EMERGENCY)
Facility: HOSPITAL | Age: 52
Discharge: HOME | End: 2022-06-10
Attending: FAMILY MEDICINE
Payer: MEDICARE

## 2022-06-10 VITALS
SYSTOLIC BLOOD PRESSURE: 125 MMHG | OXYGEN SATURATION: 97 % | HEART RATE: 83 BPM | DIASTOLIC BLOOD PRESSURE: 74 MMHG | BODY MASS INDEX: 31.79 KG/M2 | WEIGHT: 227.06 LBS | HEIGHT: 71 IN | TEMPERATURE: 98 F | RESPIRATION RATE: 20 BRPM

## 2022-06-10 DIAGNOSIS — R21 RASH OF FACE: ICD-10-CM

## 2022-06-10 DIAGNOSIS — S80.812A ABRASION OF ANTERIOR LEFT LOWER LEG, INITIAL ENCOUNTER: Primary | ICD-10-CM

## 2022-06-10 LAB
ANION GAP SERPL CALC-SCNC: 8 MEQ/L
BASOPHILS # BLD AUTO: 0.04 X10(3)/MCL (ref 0–0.2)
BASOPHILS NFR BLD AUTO: 0.5 %
BUN SERPL-MCNC: 16.2 MG/DL (ref 9.8–20.1)
CALCIUM SERPL-MCNC: 9.3 MG/DL (ref 8.4–10.2)
CHLORIDE SERPL-SCNC: 109 MMOL/L (ref 98–107)
CO2 SERPL-SCNC: 23 MMOL/L (ref 22–29)
CREAT SERPL-MCNC: 0.77 MG/DL (ref 0.55–1.02)
CREAT/UREA NIT SERPL: 21
EOSINOPHIL # BLD AUTO: 0.19 X10(3)/MCL (ref 0–0.9)
EOSINOPHIL NFR BLD AUTO: 2.4 %
ERYTHROCYTE [DISTWIDTH] IN BLOOD BY AUTOMATED COUNT: 14.2 % (ref 11.5–17)
GLUCOSE SERPL-MCNC: 97 MG/DL (ref 74–100)
HCT VFR BLD AUTO: 40.3 % (ref 37–47)
HGB BLD-MCNC: 13.1 GM/DL (ref 12–16)
IMM GRANULOCYTES # BLD AUTO: 0.03 X10(3)/MCL (ref 0–0.02)
IMM GRANULOCYTES NFR BLD AUTO: 0.4 % (ref 0–0.43)
LYMPHOCYTES # BLD AUTO: 1.35 X10(3)/MCL (ref 0.6–4.6)
LYMPHOCYTES NFR BLD AUTO: 17.2 %
MCH RBC QN AUTO: 30.8 PG (ref 27–31)
MCHC RBC AUTO-ENTMCNC: 32.5 MG/DL (ref 33–36)
MCV RBC AUTO: 94.6 FL (ref 80–94)
MONOCYTES # BLD AUTO: 0.33 X10(3)/MCL (ref 0.1–1.3)
MONOCYTES NFR BLD AUTO: 4.2 %
NEUTROPHILS # BLD AUTO: 5.9 X10(3)/MCL (ref 2.1–9.2)
NEUTROPHILS NFR BLD AUTO: 75.3 %
NRBC BLD AUTO-RTO: 0 %
PLATELET # BLD AUTO: 262 X10(3)/MCL (ref 130–400)
PMV BLD AUTO: 9.7 FL (ref 9.4–12.4)
POTASSIUM SERPL-SCNC: 4.2 MMOL/L (ref 3.5–5.1)
RBC # BLD AUTO: 4.26 X10(6)/MCL (ref 4.2–5.4)
SODIUM SERPL-SCNC: 140 MMOL/L (ref 136–145)
WBC # SPEC AUTO: 7.9 X10(3)/MCL (ref 4.5–11.5)

## 2022-06-10 PROCEDURE — 80048 BASIC METABOLIC PNL TOTAL CA: CPT | Performed by: NURSE PRACTITIONER

## 2022-06-10 PROCEDURE — 36415 COLL VENOUS BLD VENIPUNCTURE: CPT | Performed by: NURSE PRACTITIONER

## 2022-06-10 PROCEDURE — 99284 EMERGENCY DEPT VISIT MOD MDM: CPT | Mod: 25

## 2022-06-10 PROCEDURE — 85025 COMPLETE CBC W/AUTO DIFF WBC: CPT | Performed by: NURSE PRACTITIONER

## 2022-06-10 RX ORDER — OXYCODONE AND ACETAMINOPHEN 5; 325 MG/1; MG/1
2 TABLET ORAL
Status: DISCONTINUED | OUTPATIENT
Start: 2022-06-10 | End: 2022-06-10 | Stop reason: HOSPADM

## 2022-06-10 RX ORDER — TRIAMCINOLONE ACETONIDE 0.25 MG/G
CREAM TOPICAL 2 TIMES DAILY
Qty: 30 G | Refills: 0 | Status: SHIPPED | OUTPATIENT
Start: 2022-06-10 | End: 2022-12-28

## 2022-06-10 RX ORDER — CEPHALEXIN 500 MG/1
500 CAPSULE ORAL 4 TIMES DAILY
Qty: 20 CAPSULE | Refills: 0 | Status: SHIPPED | OUTPATIENT
Start: 2022-06-10 | End: 2022-06-14

## 2022-06-10 NOTE — ED PROVIDER NOTES
Encounter Date: 6/10/2022       History     Chief Complaint   Patient presents with    Wound Infection     C/o blisters to left lower leg yesterday. States skin came off when she dried leg last pm and now has pain.      The patient presents with wound to left shin.  The onset was 1 days ago.  The course/duration of symptoms is constant.  Location: left lower extremity. The character of symptoms is pain.  Radiating symptom(s): none.  The degree of symptoms is moderate.  Risk factors consist of lupus, PAD, chronic pain for which she takes norco 7.5 mg - she requests stronger pain medication.  Prior episodes: none.  Associated symptoms: mild erythema, denies fever, denies chills and denies shortness of breath. She also reports rash to R cheek for 2 weeks. Tetanus UTD.    The history is provided by the patient.     Review of patient's allergies indicates:   Allergen Reactions    Heparin Anaphylaxis    Vancomycin      Other reaction(s): Blotchy, Hives     History reviewed. No pertinent past medical history.  History reviewed. No pertinent surgical history.  History reviewed. No pertinent family history.  Social History     Tobacco Use    Smoking status: Current Every Day Smoker    Smokeless tobacco: Never Used    Tobacco comment: patient reports she smokes marijuana   Substance Use Topics    Alcohol use: Yes     Comment: socially    Drug use: Never     Review of Systems   Constitutional: Negative for fever.   HENT: Negative for sore throat.    Respiratory: Negative for shortness of breath.    Cardiovascular: Negative for chest pain.   Gastrointestinal: Negative for nausea.   Genitourinary: Negative for dysuria.   Musculoskeletal: Negative for back pain.   Skin: Positive for rash and wound.   Neurological: Negative for weakness.   Hematological: Does not bruise/bleed easily.   All other systems reviewed and are negative.      Physical Exam     Initial Vitals [06/10/22 1406]   BP Pulse Resp Temp SpO2   125/74 83 20  98.1 °F (36.7 °C) 97 %      MAP       --         Physical Exam    Nursing note and vitals reviewed.  Constitutional: She appears well-developed and well-nourished.   HENT:   Head: Normocephalic and atraumatic.   Neck: Neck supple.   Normal range of motion.  Cardiovascular: Normal rate, regular rhythm, normal heart sounds and intact distal pulses.   Pulmonary/Chest: Breath sounds normal.   Abdominal: Abdomen is soft. Bowel sounds are normal.   Musculoskeletal:         General: Normal range of motion.      Cervical back: Normal range of motion and neck supple.     Neurological: She is alert. She has normal strength.   Skin: Skin is warm and dry.   Raised erythematous rash to R cheek. Small skin tear to L anterior lower extremity with mild surrounding erythema. No drainage noted.   Psychiatric: She has a normal mood and affect.         ED Course   Procedures  Labs Reviewed   BASIC METABOLIC PANEL - Abnormal; Notable for the following components:       Result Value    Chloride 109 (*)     All other components within normal limits   CBC WITH DIFFERENTIAL - Abnormal; Notable for the following components:    MCV 94.6 (*)     MCHC 32.5 (*)     IG# 0.03 (*)     All other components within normal limits   CBC W/ AUTO DIFFERENTIAL    Narrative:     The following orders were created for panel order CBC auto differential.  Procedure                               Abnormality         Status                     ---------                               -----------         ------                     CBC with Differential[[id number]]        Abnormal            Final result                 Please view results for these tests on the individual orders.          Imaging Results    None          Medications   oxyCODONE-acetaminophen 5-325 mg per tablet 2 tablet (has no administration in time range)     Medical Decision Making:   History:   Old Records Summarized: records from clinic visits and records from previous admission(s).  Clinical  Tests:   Lab Tests: Ordered and Reviewed                      Clinical Impression:   Final diagnoses:  [Z84.096L] Abrasion of anterior left lower leg, initial encounter (Primary)  [R21] Rash of face          ED Disposition Condition    Discharge Stable        ED Prescriptions     Medication Sig Dispense Start Date End Date Auth. Provider    cephALEXin (KEFLEX) 500 MG capsule Take 1 capsule (500 mg total) by mouth 4 (four) times daily. for 5 days 20 capsule 6/10/2022 6/15/2022 MARLEN Bravo    triamcinolone acetonide 0.025% (KENALOG) 0.025 % cream Apply topically 2 (two) times daily. Apply small amount to rash, rubbing in well, avoid getting in eyes. 30 g 6/10/2022  MARLEN Bravo        Follow-up Information     Follow up With Specialties Details Why Contact Info    follow up with your pcp in 3-5 days        [facility] Emergency Medicine  If symptoms worsen [address] [facility] [address] [phone]           MARLEN Bravo  06/10/22 2916

## 2022-06-13 ENCOUNTER — TELEPHONE (OUTPATIENT)
Dept: FAMILY MEDICINE | Facility: CLINIC | Age: 52
End: 2022-06-13
Payer: MEDICARE

## 2022-06-13 NOTE — TELEPHONE ENCOUNTER
----- Message from Louis Garcia sent at 6/13/2022 12:19 PM CDT -----  Regarding: Medical Advice  Type:  Needs Medical Advice    Who Called: pt   Symptoms (please be specific):  na   How long has patient had these symptoms:   na  Pharmacy name and phone #:   na   Would the patient rather a call back or a response via [facility]?  Call back   Best Call Back Number: [phone]  Additional Information:  pt didn't want to discuss via phone, stated it was private issues

## 2022-06-13 NOTE — TELEPHONE ENCOUNTER
Patient stated her skin on her legs are raw. Stated she went to ER 6/11 and was told to see dermatology. 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 Riddle. Patient forwarded to front office staff to schedule appt.

## 2022-06-14 ENCOUNTER — OFFICE VISIT (OUTPATIENT)
Dept: FAMILY MEDICINE | Facility: CLINIC | Age: 52
End: 2022-06-14
Payer: MEDICARE

## 2022-06-14 ENCOUNTER — DOCUMENTATION ONLY (OUTPATIENT)
Dept: ADMINISTRATIVE | Facility: HOSPITAL | Age: 52
End: 2022-06-14
Payer: MEDICARE

## 2022-06-14 VITALS
HEIGHT: 71 IN | DIASTOLIC BLOOD PRESSURE: 80 MMHG | OXYGEN SATURATION: 99 % | RESPIRATION RATE: 18 BRPM | HEART RATE: 65 BPM | TEMPERATURE: 98 F | BODY MASS INDEX: 32.11 KG/M2 | WEIGHT: 229.38 LBS | SYSTOLIC BLOOD PRESSURE: 118 MMHG

## 2022-06-14 DIAGNOSIS — M32.19 LUPUS VASCULITIS: ICD-10-CM

## 2022-06-14 DIAGNOSIS — L98.9 SKIN SORE: ICD-10-CM

## 2022-06-14 DIAGNOSIS — E66.9 CLASS 1 OBESITY WITH BODY MASS INDEX (BMI) OF 31.0 TO 31.9 IN ADULT, UNSPECIFIED OBESITY TYPE, UNSPECIFIED WHETHER SERIOUS COMORBIDITY PRESENT: ICD-10-CM

## 2022-06-14 DIAGNOSIS — I77.89 LUPUS VASCULITIS: ICD-10-CM

## 2022-06-14 DIAGNOSIS — I73.9 PERIPHERAL VASCULAR DISEASE: Primary | ICD-10-CM

## 2022-06-14 DIAGNOSIS — F17.210 CIGARETTE SMOKER: ICD-10-CM

## 2022-06-14 PROBLEM — Z00.00 MEDICARE ANNUAL WELLNESS VISIT, SUBSEQUENT: Status: ACTIVE | Noted: 2022-06-14

## 2022-06-14 PROBLEM — Q89.01 ASPLENIA: Status: ACTIVE | Noted: 2022-06-14

## 2022-06-14 PROCEDURE — 3074F PR MOST RECENT SYSTOLIC BLOOD PRESSURE < 130 MM HG: ICD-10-PCS | Mod: CPTII,,, | Performed by: FAMILY MEDICINE

## 2022-06-14 PROCEDURE — 99213 OFFICE O/P EST LOW 20 MIN: CPT | Mod: ,,, | Performed by: FAMILY MEDICINE

## 2022-06-14 PROCEDURE — 3008F BODY MASS INDEX DOCD: CPT | Mod: CPTII,,, | Performed by: FAMILY MEDICINE

## 2022-06-14 PROCEDURE — 99213 PR OFFICE/OUTPT VISIT, EST, LEVL III, 20-29 MIN: ICD-10-PCS | Mod: ,,, | Performed by: FAMILY MEDICINE

## 2022-06-14 PROCEDURE — 1159F MED LIST DOCD IN RCRD: CPT | Mod: CPTII,,, | Performed by: FAMILY MEDICINE

## 2022-06-14 PROCEDURE — 3079F PR MOST RECENT DIASTOLIC BLOOD PRESSURE 80-89 MM HG: ICD-10-PCS | Mod: CPTII,,, | Performed by: FAMILY MEDICINE

## 2022-06-14 PROCEDURE — 3079F DIAST BP 80-89 MM HG: CPT | Mod: CPTII,,, | Performed by: FAMILY MEDICINE

## 2022-06-14 PROCEDURE — 1159F PR MEDICATION LIST DOCUMENTED IN MEDICAL RECORD: ICD-10-PCS | Mod: CPTII,,, | Performed by: FAMILY MEDICINE

## 2022-06-14 PROCEDURE — 3008F PR BODY MASS INDEX (BMI) DOCUMENTED: ICD-10-PCS | Mod: CPTII,,, | Performed by: FAMILY MEDICINE

## 2022-06-14 PROCEDURE — 3074F SYST BP LT 130 MM HG: CPT | Mod: CPTII,,, | Performed by: FAMILY MEDICINE

## 2022-06-14 RX ORDER — SULFAMETHOXAZOLE AND TRIMETHOPRIM 800; 160 MG/1; MG/1
1 TABLET ORAL 2 TIMES DAILY
Qty: 28 TABLET | Refills: 0 | Status: SHIPPED | OUTPATIENT
Start: 2022-06-14 | End: 2022-06-28

## 2022-06-14 RX ORDER — MUPIROCIN 20 MG/G
OINTMENT TOPICAL 3 TIMES DAILY
Qty: 30 G | Refills: 3 | Status: SHIPPED | OUTPATIENT
Start: 2022-06-14 | End: 2023-08-02

## 2022-06-14 NOTE — ASSESSMENT & PLAN NOTE
No improvement s/p keflex from ER visit 6/10/22.  Labs 6/10/22 wnl.  No fever. Will change antibiotics to bactrim.  Will send in mupirocin ointment.  Declines wound care referral. Encourage smoking cessation.  Keep appointments with pain management. Follow up in 1 week or sooner if needed.  Contact clinic for concerns.  ER precautions discussed.

## 2022-06-14 NOTE — ASSESSMENT & PLAN NOTE
Previously seeing dr. chavez at [facility].  Has not seen anyone since she moved.  Will place referral today.  On plaquenil and prednisone PO

## 2022-06-28 RX ORDER — PRASUGREL 10 MG/1
10 TABLET, FILM COATED ORAL DAILY
Qty: 30 TABLET | Refills: 3 | Status: SHIPPED | OUTPATIENT
Start: 2022-06-28 | End: 2022-12-15

## 2022-06-28 NOTE — TELEPHONE ENCOUNTER
----- Message from Declan Pandey sent at 6/28/2022 10:11 AM CDT -----  .Type:  RX Refill Request    Who Called: Vanesa  Refill or New Rx:  RX Name and Strength: Blood thinner,   How is the patient currently taking it? (ex. 1XDay):  Is this a 30 day or 90 day RX:  Preferred Pharmacy with phone number: [email or web address] or Mail Order:  Ordering Provider: Janessa  Would the patient rather a call back or a response via [facility]?   Best Call Back Number: [phone]  Additional Information: The pharmacy has sent request in to be filled, they have heard nothing back from the office.

## 2022-07-18 RX ORDER — GABAPENTIN 300 MG/1
300 CAPSULE ORAL DAILY
Qty: 30 CAPSULE | Refills: 0 | Status: SHIPPED | OUTPATIENT
Start: 2022-07-18 | End: 2022-08-24 | Stop reason: SDUPTHER

## 2022-07-18 NOTE — TELEPHONE ENCOUNTER
----- Message from Lou Encinas sent at 7/18/2022  9:30 AM CDT -----  Regarding: refill  Type:  RX Refill Request    Who Called: pt  Refill or New Rx:refill  RX Name and Strength:gabapentin 300 mgs  How is the patient currently taking it? (ex. 1XDay): 1 day  Is this a 30 day or 90 day RX:30  Preferred Pharmacy with phone number: [facility] [employer]  Local or Mail Order:local  Ordering Provider:ravinder helton  Would the patient rather a call back or a response via [facility]? C/b  Best Call Back Number:[phone]  Additional Information:

## 2022-07-21 DIAGNOSIS — M06.9 RHEUMATOID ARTHRITIS, INVOLVING UNSPECIFIED SITE, UNSPECIFIED WHETHER RHEUMATOID FACTOR PRESENT: Primary | ICD-10-CM

## 2022-07-21 DIAGNOSIS — M32.9 LUPUS: ICD-10-CM

## 2022-07-21 RX ORDER — BELIMUMAB 200 MG/ML
200 SOLUTION SUBCUTANEOUS
Qty: 4 ML | Refills: 1 | Status: SHIPPED | OUTPATIENT
Start: 2022-07-21 | End: 2022-09-30

## 2022-07-21 RX ORDER — BARICITINIB 2 MG/1
2 TABLET, FILM COATED ORAL DAILY
Qty: 30 TABLET | Refills: 1 | Status: SHIPPED | OUTPATIENT
Start: 2022-07-21 | End: 2022-09-19

## 2022-07-21 NOTE — TELEPHONE ENCOUNTER
Received fax request for refill of Benlysta  Reviewed chart and noted Ol[facility]iant due for refill also  Added [id number]  Refills for both meds entered, pended and routed to Dr. Flowers

## 2022-08-14 ENCOUNTER — HOSPITAL ENCOUNTER (EMERGENCY)
Facility: HOSPITAL | Age: 52
Discharge: HOME | End: 2022-08-14
Attending: FAMILY MEDICINE
Payer: MEDICARE

## 2022-08-14 VITALS
HEIGHT: 71 IN | SYSTOLIC BLOOD PRESSURE: 138 MMHG | BODY MASS INDEX: 32.2 KG/M2 | HEART RATE: 81 BPM | DIASTOLIC BLOOD PRESSURE: 88 MMHG | RESPIRATION RATE: 18 BRPM | WEIGHT: 230 LBS | OXYGEN SATURATION: 96 % | TEMPERATURE: 98 F

## 2022-08-14 DIAGNOSIS — R11.2 NAUSEA AND VOMITING, INTRACTABILITY OF VOMITING NOT SPECIFIED, UNSPECIFIED VOMITING TYPE: Primary | ICD-10-CM

## 2022-08-14 DIAGNOSIS — I73.9 PAD (PERIPHERAL ARTERY DISEASE): ICD-10-CM

## 2022-08-14 DIAGNOSIS — N30.00 ACUTE CYSTITIS WITHOUT HEMATURIA: ICD-10-CM

## 2022-08-14 DIAGNOSIS — M79.606 CHRONIC PAIN OF LOWER EXTREMITY, UNSPECIFIED LATERALITY: ICD-10-CM

## 2022-08-14 DIAGNOSIS — G89.29 CHRONIC PAIN OF LOWER EXTREMITY, UNSPECIFIED LATERALITY: ICD-10-CM

## 2022-08-14 LAB
ABS NEUT CALC (OHS): 5.18 X10(3)/MCL (ref 2.1–9.2)
ALBUMIN SERPL-MCNC: 3.3 GM/DL (ref 3.5–5)
ALBUMIN/GLOB SERPL: 1 RATIO (ref 1.1–2)
ALP SERPL-CCNC: 161 UNIT/L (ref 40–150)
ALT SERPL-CCNC: 52 UNIT/L (ref 0–55)
APPEARANCE UR: CLEAR
AST SERPL-CCNC: 69 UNIT/L (ref 5–34)
BACTERIA #/AREA URNS AUTO: ABNORMAL /HPF
BILIRUB UR QL STRIP.AUTO: NEGATIVE MG/DL
BILIRUBIN DIRECT+TOT PNL SERPL-MCNC: 0.3 MG/DL
BUN SERPL-MCNC: 16.3 MG/DL (ref 9.8–20.1)
CALCIUM SERPL-MCNC: 9.3 MG/DL (ref 8.4–10.2)
CHLORIDE SERPL-SCNC: 107 MMOL/L (ref 98–107)
CO2 SERPL-SCNC: 24 MMOL/L (ref 22–29)
COLOR UR AUTO: ABNORMAL
CREAT SERPL-MCNC: 0.75 MG/DL (ref 0.55–1.02)
EOSINOPHIL NFR BLD MANUAL: 0.07 X10(3)/MCL (ref 0–0.9)
EOSINOPHIL NFR BLD MANUAL: 1 % (ref 0–8)
ERYTHROCYTE [DISTWIDTH] IN BLOOD BY AUTOMATED COUNT: 13.6 % (ref 11.5–17)
GFR SERPLBLD CREATININE-BSD FMLA CKD-EPI: >60 MLS/MIN/1.73/M2
GLOBULIN SER-MCNC: 3.3 GM/DL (ref 2.4–3.5)
GLUCOSE SERPL-MCNC: 124 MG/DL (ref 74–100)
GLUCOSE UR QL STRIP.AUTO: NORMAL MG/DL
HCT VFR BLD AUTO: 37.5 % (ref 37–47)
HGB BLD-MCNC: 12.9 GM/DL (ref 12–16)
HYALINE CASTS #/AREA URNS LPF: ABNORMAL /LPF
IMM GRANULOCYTES # BLD AUTO: 0.02 X10(3)/MCL (ref 0–0.04)
IMM GRANULOCYTES NFR BLD AUTO: 0.3 %
KETONES UR QL STRIP.AUTO: NEGATIVE MG/DL
LEUKOCYTE ESTERASE UR QL STRIP.AUTO: 25 UNIT/L
LIPASE SERPL-CCNC: 7 U/L
LYMPHOCYTES NFR BLD MANUAL: 1.92 X10(3)/MCL
LYMPHOCYTES NFR BLD MANUAL: 26 % (ref 13–40)
MCH RBC QN AUTO: 32.1 PG (ref 27–31)
MCHC RBC AUTO-ENTMCNC: 34.4 MG/DL (ref 33–36)
MCV RBC AUTO: 93.3 FL (ref 80–94)
MONOCYTES NFR BLD MANUAL: 0.22 X10(3)/MCL (ref 0.1–1.3)
MONOCYTES NFR BLD MANUAL: 3 % (ref 2–11)
NEUTROPHILS NFR BLD MANUAL: 70 % (ref 47–80)
NITRITE UR QL STRIP.AUTO: ABNORMAL
NRBC BLD AUTO-RTO: 0 %
PH UR STRIP.AUTO: 6.5 [PH]
PLATELET # BLD AUTO: 282 X10(3)/MCL (ref 130–400)
PLATELET # BLD EST: ADEQUATE 10*3/UL
PMV BLD AUTO: 9.6 FL (ref 7.4–10.4)
POTASSIUM SERPL-SCNC: 4.3 MMOL/L (ref 3.5–5.1)
PROT SERPL-MCNC: 6.6 GM/DL (ref 6.4–8.3)
PROT UR QL STRIP.AUTO: ABNORMAL MG/DL
RBC # BLD AUTO: 4.02 X10(6)/MCL (ref 4.2–5.4)
RBC #/AREA URNS AUTO: ABNORMAL /HPF
RBC MORPH BLD: NORMAL
RBC UR QL AUTO: ABNORMAL UNIT/L
SODIUM SERPL-SCNC: 139 MMOL/L (ref 136–145)
SP GR UR STRIP.AUTO: 1.01
SQUAMOUS #/AREA URNS LPF: ABNORMAL /HPF
UROBILINOGEN UR STRIP-ACNC: NORMAL MG/DL
WBC # SPEC AUTO: 7.4 X10(3)/MCL (ref 4.5–11.5)
WBC #/AREA URNS AUTO: ABNORMAL /HPF
YEAST BUDDING URNS QL: ABNORMAL /HPF

## 2022-08-14 PROCEDURE — 99284 EMERGENCY DEPT VISIT MOD MDM: CPT | Mod: 25

## 2022-08-14 PROCEDURE — 85025 COMPLETE CBC W/AUTO DIFF WBC: CPT | Performed by: NURSE PRACTITIONER

## 2022-08-14 PROCEDURE — 80053 COMPREHEN METABOLIC PANEL: CPT | Performed by: NURSE PRACTITIONER

## 2022-08-14 PROCEDURE — 87077 CULTURE AEROBIC IDENTIFY: CPT | Performed by: NURSE PRACTITIONER

## 2022-08-14 PROCEDURE — 25000003 PHARM REV CODE 250: Performed by: NURSE PRACTITIONER

## 2022-08-14 PROCEDURE — 81001 URINALYSIS AUTO W/SCOPE: CPT | Performed by: NURSE PRACTITIONER

## 2022-08-14 PROCEDURE — 63600175 PHARM REV CODE 636 W HCPCS: Performed by: NURSE PRACTITIONER

## 2022-08-14 PROCEDURE — 83690 ASSAY OF LIPASE: CPT | Performed by: NURSE PRACTITIONER

## 2022-08-14 PROCEDURE — 36415 COLL VENOUS BLD VENIPUNCTURE: CPT | Performed by: NURSE PRACTITIONER

## 2022-08-14 PROCEDURE — 96372 THER/PROPH/DIAG INJ SC/IM: CPT | Performed by: NURSE PRACTITIONER

## 2022-08-14 PROCEDURE — 96374 THER/PROPH/DIAG INJ IV PUSH: CPT

## 2022-08-14 PROCEDURE — 96361 HYDRATE IV INFUSION ADD-ON: CPT

## 2022-08-14 RX ORDER — NITROFURANTOIN 25; 75 MG/1; MG/1
100 CAPSULE ORAL 2 TIMES DAILY
Qty: 14 CAPSULE | Refills: 0 | Status: SHIPPED | OUTPATIENT
Start: 2022-08-14 | End: 2022-08-21

## 2022-08-14 RX ORDER — PROMETHAZINE HYDROCHLORIDE 25 MG/ML
25 INJECTION, SOLUTION INTRAMUSCULAR; INTRAVENOUS
Status: COMPLETED | OUTPATIENT
Start: 2022-08-14 | End: 2022-08-14

## 2022-08-14 RX ORDER — ONDANSETRON 4 MG/1
4 TABLET, ORALLY DISINTEGRATING ORAL EVERY 6 HOURS PRN
Qty: 12 TABLET | Refills: 0 | Status: SHIPPED | OUTPATIENT
Start: 2022-08-14 | End: 2022-12-28

## 2022-08-14 RX ORDER — MORPHINE SULFATE 2 MG/ML
4 INJECTION, SOLUTION INTRAMUSCULAR; INTRAVENOUS
Status: COMPLETED | OUTPATIENT
Start: 2022-08-14 | End: 2022-08-14

## 2022-08-14 RX ADMIN — SODIUM CHLORIDE 1000 ML: 9 INJECTION, SOLUTION INTRAVENOUS at 05:08

## 2022-08-14 RX ADMIN — PROMETHAZINE HYDROCHLORIDE 25 MG: 25 INJECTION INTRAMUSCULAR; INTRAVENOUS at 06:08

## 2022-08-14 RX ADMIN — MORPHINE SULFATE 4 MG: 2 INJECTION, SOLUTION INTRAMUSCULAR; INTRAVENOUS at 05:08

## 2022-08-14 NOTE — ED PROVIDER NOTES
Encounter Date: 8/14/2022       History     Chief Complaint   Patient presents with    Leg Pain     PT W HX OF PAD W STENTS AND BYPASS TO BILAT LEGS CO RT LOWER LEG PAIN W NV X 3 DAYS. ? DOPPLER PULSE.       The patient presents with reports occasional n/v for 2 days, she sees pain mgt provider for chronic leg pain d/t PAD, she has not been able to take her pain medication for 2 days due to nausea, she is here complaining of nausea and her chronic bilateral leg pain.  The onset was 2 days ago.  The course/duration of symptoms is fluctuating in intensity.  The character of symptoms is dull pain.  The degree at onset was minimal.  The Location of pain is bilateral lower extremities.  The degree at present is moderate.  Radiating pain: none. There are exacerbating factors to nausea is drinking and eating. Her bilateral lower extremity pain increases with movement.  There are relieving factors including none.  Therapy today: none.  Risk factors consist of PAD.  Associated symptoms: nausea, vomiting, denies chest pain, denies diarrhea, denies back pain, denies shortness of breath, denies fever, denies chills, denies headache and denies dizziness.  Normal bm this yesterday.         Review of patient's allergies indicates:   Allergen Reactions    Heparin Anaphylaxis    Vancomycin      Other reaction(s): Blotchy, Hives     Past Medical History:   Diagnosis Date    PAD (peripheral artery disease)     Systemic lupus erythematosus, organ or system involvement unspecified      Past Surgical History:   Procedure Laterality Date    HYSTERECTOMY  07/01/2013     History reviewed. No pertinent family history.  Social History     Tobacco Use    Smoking status: Current Some Day Smoker     Types: Cigarettes    Smokeless tobacco: Never Used    Tobacco comment: patient reports she smokes marijuana   Substance Use Topics    Alcohol use: Yes     Comment: socially    Drug use: Never     Review of Systems   Constitutional:  Negative for fever.   HENT: Negative for sore throat.    Respiratory: Negative for shortness of breath.    Cardiovascular: Negative for chest pain.   Gastrointestinal: Positive for nausea and vomiting.   Genitourinary: Negative for dysuria.   Musculoskeletal: Negative for back pain.   Skin: Negative for rash.   Neurological: Negative for weakness.   Hematological: Does not bruise/bleed easily.   All other systems reviewed and are negative.      Physical Exam     Initial Vitals [08/14/22 1655]   BP Pulse Resp Temp SpO2   138/88 81 16 97.9 °F (36.6 °C) 96 %      MAP       --         Physical Exam    Nursing note and vitals reviewed.  Constitutional: She appears well-developed and well-nourished.   HENT:   Head: Normocephalic and atraumatic.   Neck: Neck supple.   Normal range of motion.  Cardiovascular: Normal rate, regular rhythm, normal heart sounds and intact distal pulses.   Pulmonary/Chest: Breath sounds normal.   Abdominal: Abdomen is soft. Bowel sounds are normal. There is no abdominal tenderness.   Musculoskeletal:         General: Normal range of motion.      Cervical back: Normal range of motion and neck supple.      Comments: Bilateral feet warm with doppler dp/pt pulses     Neurological: She is alert. She has normal strength.   Skin: Skin is warm and dry.   Psychiatric: She has a normal mood and affect.         ED Course   Procedures  Labs Reviewed   COMPREHENSIVE METABOLIC PANEL - Abnormal; Notable for the following components:       Result Value    Glucose Level 124 (*)     Albumin Level 3.3 (*)     Albumin/Globulin Ratio 1.0 (*)     Alkaline Phosphatase 161 (*)     Aspartate Aminotransferase 69 (*)     All other components within normal limits   URINALYSIS, REFLEX TO URINE CULTURE - Abnormal; Notable for the following components:    Color, UA Light-Yellow (*)     Protein, UA 2+ (*)     Blood, UA 3+ (*)     Nitrites, UA 2+ (*)     Leukocyte Esterase, UA 25  (*)     WBC, UA 11-20 (*)     Bacteria, UA Many  (*)     RBC, UA 6-10 (*)     All other components within normal limits   CBC WITH DIFFERENTIAL - Abnormal; Notable for the following components:    RBC 4.02 (*)     MCH 32.1 (*)     All other components within normal limits   LIPASE - Normal   MANUAL DIFFERENTIAL - Normal   CULTURE, URINE   CBC W/ AUTO DIFFERENTIAL    Narrative:     The following orders were created for panel order CBC auto differential.  Procedure                               Abnormality         Status                     ---------                               -----------         ------                     CBC with Differential[[id number]]        Abnormal            Final result               Manual Differential[[id number]]          Normal              Final result                 Please view results for these tests on the individual orders.   EXTRA TUBES    Narrative:     The following orders were created for panel order EXTRA TUBES.  Procedure                               Abnormality         Status                     ---------                               -----------         ------                     Light Blue Top Hold[[id number]]                              In process                 Red Top Hold[[id number]]                                     In process                 Gold Top Hold[[id number]]                                    In process                   Please view results for these tests on the individual orders.   LIGHT BLUE TOP HOLD   RED TOP HOLD   GOLD TOP HOLD          Imaging Results    None          Medications   sodium chloride 0.9% bolus 1,000 mL (0 mLs Intravenous Stopped 8/14/22 1901)   morphine injection 4 mg (4 mg Intravenous Given 8/14/22 1743)   promethazine injection 25 mg (25 mg Intramuscular Given 8/14/22 1808)     Medical Decision Making:   History:   Old Records Summarized: records from clinic visits and records from previous admission(s).  Clinical Tests:   Lab Tests: Ordered and Reviewed  7:27 PM DISPOSITION: The  patient is resting comfortably in no acute distress.  She is hemodynamically stable and is without objective evidence for acute process requiring urgent intervention or hospitalization. I provided counseling to patient with regard to condition, the treatment plan, specific conditions for return, and the importance of follow up. Detailed written and verbal instructions provided to patient and she expressed a verbal understanding of the discharge instructions and overall management plan. Reiterated the importance of medication administration and safety and advised patient to follow up with primary care provider in 3-5 days or sooner if needed.  Answered questions at this time. The patient is stable for discharge.     She will continue pain medication as per her pain management provider.                        Clinical Impression:   Final diagnoses:  [R11.2] Nausea and vomiting, intractability of vomiting not specified, unspecified vomiting type (Primary)  [M79.606, G89.29] Chronic pain of lower extremity, unspecified laterality  [I73.9] PAD (peripheral artery disease)  [N30.00] Acute cystitis without hematuria          ED Disposition Condition    Discharge Stable        ED Prescriptions     Medication Sig Dispense Start Date End Date Auth. Provider    ondansetron (ZOFRAN-ODT) 4 MG TbDL Take 1 tablet (4 mg total) by mouth every 6 (six) hours as needed (nausea). 12 tablet 8/14/2022  MARLEN Bravo    nitrofurantoin, macrocrystal-monohydrate, (MACROBID) 100 MG capsule Take 1 capsule (100 mg total) by mouth 2 (two) times daily. for 7 days 14 capsule 8/14/2022 8/21/2022 MARLEN Bravo        Follow-up Information     Follow up With Specialties Details Why Contact Info    Brianna Riddle MD Family Medicine In 3 days  [address]  [address]  [facility] [address]  [phone]      [facility] Emergency Medicine  If symptoms worsen [address]  [address]  [phone]           Eric Dominguez, [facility]  08/14/22 1928

## 2022-08-15 NOTE — ED NOTES
Daughter Brianna Cramer here to transport patient home due to narcotic administration.  Instructions given to both.  Verbalizes understanding.

## 2022-08-17 LAB — BACTERIA UR CULT: ABNORMAL

## 2022-08-18 ENCOUNTER — HOSPITAL ENCOUNTER (INPATIENT)
Facility: HOSPITAL | Age: 52
LOS: 2 days | Discharge: HOME | DRG: 315 | End: 2022-08-20
Attending: EMERGENCY MEDICINE | Admitting: INTERNAL MEDICINE
Payer: MEDICARE

## 2022-08-18 DIAGNOSIS — I73.9 ARTERIAL INSUFFICIENCY OF LOWER EXTREMITY: ICD-10-CM

## 2022-08-18 DIAGNOSIS — I73.9 ARTERIAL INSUFFICIENCY OF LOWER EXTREMITY: Primary | ICD-10-CM

## 2022-08-18 DIAGNOSIS — E66.9 CLASS 1 OBESITY WITH BODY MASS INDEX (BMI) OF 31.0 TO 31.9 IN ADULT, UNSPECIFIED OBESITY TYPE, UNSPECIFIED WHETHER SERIOUS COMORBIDITY PRESENT: ICD-10-CM

## 2022-08-18 DIAGNOSIS — M79.604 RIGHT LEG PAIN: ICD-10-CM

## 2022-08-18 DIAGNOSIS — R07.9 CHEST PAIN: ICD-10-CM

## 2022-08-18 DIAGNOSIS — M79.89 RIGHT LEG SWELLING: ICD-10-CM

## 2022-08-18 DIAGNOSIS — M79.671 RIGHT FOOT PAIN: ICD-10-CM

## 2022-08-18 DIAGNOSIS — L98.9 SKIN SORE: ICD-10-CM

## 2022-08-18 DIAGNOSIS — I73.9 PERIPHERAL ARTERY DISEASE: ICD-10-CM

## 2022-08-18 DIAGNOSIS — F17.210 CIGARETTE SMOKER: ICD-10-CM

## 2022-08-18 LAB
ALBUMIN SERPL-MCNC: 3.2 GM/DL (ref 3.5–5)
ALBUMIN/GLOB SERPL: 1 RATIO (ref 1.1–2)
ALP SERPL-CCNC: 163 UNIT/L (ref 40–150)
ALT SERPL-CCNC: 50 UNIT/L (ref 0–55)
APPEARANCE UR: CLEAR
APTT PPP: 52.4 SECONDS (ref 23.2–33.7)
AST SERPL-CCNC: 61 UNIT/L (ref 5–34)
BACTERIA #/AREA URNS AUTO: ABNORMAL /HPF
BASOPHILS # BLD AUTO: 0.07 X10(3)/MCL (ref 0–0.2)
BASOPHILS NFR BLD AUTO: 0.9 %
BILIRUB UR QL STRIP.AUTO: NEGATIVE MG/DL
BILIRUBIN DIRECT+TOT PNL SERPL-MCNC: 0.4 MG/DL
BUN SERPL-MCNC: 14.2 MG/DL (ref 9.8–20.1)
CALCIUM SERPL-MCNC: 8.9 MG/DL (ref 8.4–10.2)
CHLORIDE SERPL-SCNC: 113 MMOL/L (ref 98–107)
CO2 SERPL-SCNC: 16 MMOL/L (ref 22–29)
COLOR UR AUTO: YELLOW
CREAT SERPL-MCNC: 0.71 MG/DL (ref 0.55–1.02)
EOSINOPHIL # BLD AUTO: 0.21 X10(3)/MCL (ref 0–0.9)
EOSINOPHIL NFR BLD AUTO: 2.6 %
ERYTHROCYTE [DISTWIDTH] IN BLOOD BY AUTOMATED COUNT: 14 % (ref 11.5–17)
EST. AVERAGE GLUCOSE BLD GHB EST-MCNC: 114 MG/DL
GFR SERPLBLD CREATININE-BSD FMLA CKD-EPI: >60 MLS/MIN/1.73/M2
GLOBULIN SER-MCNC: 3.1 GM/DL (ref 2.4–3.5)
GLUCOSE SERPL-MCNC: 97 MG/DL (ref 74–100)
GLUCOSE UR QL STRIP.AUTO: NEGATIVE MG/DL
HBA1C MFR BLD: 5.6 %
HCT VFR BLD AUTO: 40.4 % (ref 37–47)
HGB BLD-MCNC: 12.7 GM/DL (ref 12–16)
IMM GRANULOCYTES # BLD AUTO: 0.03 X10(3)/MCL (ref 0–0.04)
IMM GRANULOCYTES NFR BLD AUTO: 0.4 %
INR BLD: 1.14 (ref 0–1.3)
KETONES UR QL STRIP.AUTO: NEGATIVE MG/DL
LACTATE SERPL-SCNC: 1 MMOL/L (ref 0.5–2.2)
LEUKOCYTE ESTERASE UR QL STRIP.AUTO: ABNORMAL UNIT/L
LIPASE SERPL-CCNC: 16 U/L
LYMPHOCYTES # BLD AUTO: 1.92 X10(3)/MCL (ref 0.6–4.6)
LYMPHOCYTES NFR BLD AUTO: 24 %
MCH RBC QN AUTO: 31.5 PG (ref 27–31)
MCHC RBC AUTO-ENTMCNC: 31.4 MG/DL (ref 33–36)
MCV RBC AUTO: 100.2 FL (ref 80–94)
MONOCYTES # BLD AUTO: 0.45 X10(3)/MCL (ref 0.1–1.3)
MONOCYTES NFR BLD AUTO: 5.6 %
NEUTROPHILS # BLD AUTO: 5.3 X10(3)/MCL (ref 2.1–9.2)
NEUTROPHILS NFR BLD AUTO: 66.5 %
NITRITE UR QL STRIP.AUTO: POSITIVE
NRBC BLD AUTO-RTO: 0 %
PH UR STRIP.AUTO: 6 [PH]
PLATELET # BLD AUTO: 295 X10(3)/MCL (ref 130–400)
PMV BLD AUTO: 9.3 FL (ref 7.4–10.4)
POTASSIUM SERPL-SCNC: 4.2 MMOL/L (ref 3.5–5.1)
PROT SERPL-MCNC: 6.3 GM/DL (ref 6.4–8.3)
PROT UR QL STRIP.AUTO: ABNORMAL MG/DL
PROTHROMBIN TIME: 14.5 SECONDS (ref 12.5–14.5)
RBC # BLD AUTO: 4.03 X10(6)/MCL (ref 4.2–5.4)
RBC #/AREA URNS AUTO: <5 /HPF
RBC UR QL AUTO: ABNORMAL UNIT/L
SARS-COV-2 RDRP RESP QL NAA+PROBE: NEGATIVE
SODIUM SERPL-SCNC: 140 MMOL/L (ref 136–145)
SP GR UR STRIP.AUTO: 1.01 (ref 1–1.03)
SQUAMOUS #/AREA URNS AUTO: <5 /HPF
UROBILINOGEN UR STRIP-ACNC: 0.2 MG/DL
WBC # SPEC AUTO: 8 X10(3)/MCL (ref 4.5–11.5)
WBC #/AREA URNS AUTO: 21 /HPF

## 2022-08-18 PROCEDURE — 63600175 PHARM REV CODE 636 W HCPCS: Performed by: NURSE PRACTITIONER

## 2022-08-18 PROCEDURE — 25000003 PHARM REV CODE 250: Performed by: EMERGENCY MEDICINE

## 2022-08-18 PROCEDURE — 80053 COMPREHEN METABOLIC PANEL: CPT | Performed by: PHYSICIAN ASSISTANT

## 2022-08-18 PROCEDURE — 85025 COMPLETE CBC W/AUTO DIFF WBC: CPT | Performed by: PHYSICIAN ASSISTANT

## 2022-08-18 PROCEDURE — 96361 HYDRATE IV INFUSION ADD-ON: CPT

## 2022-08-18 PROCEDURE — 81001 URINALYSIS AUTO W/SCOPE: CPT | Performed by: PHYSICIAN ASSISTANT

## 2022-08-18 PROCEDURE — 25000003 PHARM REV CODE 250: Performed by: INTERNAL MEDICINE

## 2022-08-18 PROCEDURE — 83605 ASSAY OF LACTIC ACID: CPT | Performed by: PHYSICIAN ASSISTANT

## 2022-08-18 PROCEDURE — 63600175 PHARM REV CODE 636 W HCPCS: Performed by: EMERGENCY MEDICINE

## 2022-08-18 PROCEDURE — 99285 EMERGENCY DEPT VISIT HI MDM: CPT | Mod: 25

## 2022-08-18 PROCEDURE — 96375 TX/PRO/DX INJ NEW DRUG ADDON: CPT

## 2022-08-18 PROCEDURE — 25500020 PHARM REV CODE 255: Performed by: INTERNAL MEDICINE

## 2022-08-18 PROCEDURE — 11000001 HC ACUTE MED/SURG PRIVATE ROOM

## 2022-08-18 PROCEDURE — 87635 SARS-COV-2 COVID-19 AMP PRB: CPT | Performed by: EMERGENCY MEDICINE

## 2022-08-18 PROCEDURE — 63600175 PHARM REV CODE 636 W HCPCS: Performed by: INTERNAL MEDICINE

## 2022-08-18 PROCEDURE — 83690 ASSAY OF LIPASE: CPT | Performed by: PHYSICIAN ASSISTANT

## 2022-08-18 PROCEDURE — 36415 COLL VENOUS BLD VENIPUNCTURE: CPT | Performed by: PHYSICIAN ASSISTANT

## 2022-08-18 PROCEDURE — 85610 PROTHROMBIN TIME: CPT | Performed by: PHYSICIAN ASSISTANT

## 2022-08-18 PROCEDURE — 25000003 PHARM REV CODE 250: Performed by: NURSE PRACTITIONER

## 2022-08-18 PROCEDURE — 63600175 PHARM REV CODE 636 W HCPCS: Performed by: PHYSICIAN ASSISTANT

## 2022-08-18 PROCEDURE — 85730 THROMBOPLASTIN TIME PARTIAL: CPT | Performed by: PHYSICIAN ASSISTANT

## 2022-08-18 PROCEDURE — 96365 THER/PROPH/DIAG IV INF INIT: CPT

## 2022-08-18 PROCEDURE — 96372 THER/PROPH/DIAG INJ SC/IM: CPT | Performed by: EMERGENCY MEDICINE

## 2022-08-18 PROCEDURE — 83036 HEMOGLOBIN GLYCOSYLATED A1C: CPT | Performed by: NURSE PRACTITIONER

## 2022-08-18 PROCEDURE — 25000003 PHARM REV CODE 250: Performed by: PHYSICIAN ASSISTANT

## 2022-08-18 RX ORDER — HYDROCODONE BITARTRATE AND ACETAMINOPHEN 5; 325 MG/1; MG/1
1 TABLET ORAL EVERY 6 HOURS PRN
Status: DISCONTINUED | OUTPATIENT
Start: 2022-08-18 | End: 2022-08-20 | Stop reason: HOSPADM

## 2022-08-18 RX ORDER — TALC
6 POWDER (GRAM) TOPICAL NIGHTLY PRN
Status: DISCONTINUED | OUTPATIENT
Start: 2022-08-18 | End: 2022-08-20 | Stop reason: HOSPADM

## 2022-08-18 RX ORDER — HYDROXYCHLOROQUINE SULFATE 200 MG/1
200 TABLET, FILM COATED ORAL 2 TIMES DAILY
Status: DISCONTINUED | OUTPATIENT
Start: 2022-08-18 | End: 2022-08-20 | Stop reason: HOSPADM

## 2022-08-18 RX ORDER — SERTRALINE HYDROCHLORIDE 50 MG/1
100 TABLET, FILM COATED ORAL NIGHTLY
Status: DISCONTINUED | OUTPATIENT
Start: 2022-08-18 | End: 2022-08-20 | Stop reason: HOSPADM

## 2022-08-18 RX ORDER — GABAPENTIN 300 MG/1
300 CAPSULE ORAL DAILY
Status: DISCONTINUED | OUTPATIENT
Start: 2022-08-19 | End: 2022-08-20 | Stop reason: HOSPADM

## 2022-08-18 RX ORDER — ACETAMINOPHEN 325 MG/1
650 TABLET ORAL EVERY 4 HOURS PRN
Status: DISCONTINUED | OUTPATIENT
Start: 2022-08-18 | End: 2022-08-20 | Stop reason: HOSPADM

## 2022-08-18 RX ORDER — IBUPROFEN 200 MG
16 TABLET ORAL
Status: DISCONTINUED | OUTPATIENT
Start: 2022-08-18 | End: 2022-08-19

## 2022-08-18 RX ORDER — TRIAMCINOLONE ACETONIDE 5 MG/G
CREAM TOPICAL 2 TIMES DAILY
Status: DISCONTINUED | OUTPATIENT
Start: 2022-08-18 | End: 2022-08-20 | Stop reason: HOSPADM

## 2022-08-18 RX ORDER — ACETAMINOPHEN 325 MG/1
650 TABLET ORAL EVERY 8 HOURS PRN
Status: DISCONTINUED | OUTPATIENT
Start: 2022-08-18 | End: 2022-08-20 | Stop reason: HOSPADM

## 2022-08-18 RX ORDER — NALOXONE HCL 0.4 MG/ML
0.02 VIAL (ML) INJECTION
Status: DISCONTINUED | OUTPATIENT
Start: 2022-08-18 | End: 2022-08-20 | Stop reason: HOSPADM

## 2022-08-18 RX ORDER — ONDANSETRON 2 MG/ML
4 INJECTION INTRAMUSCULAR; INTRAVENOUS
Status: COMPLETED | OUTPATIENT
Start: 2022-08-18 | End: 2022-08-18

## 2022-08-18 RX ORDER — MORPHINE SULFATE 4 MG/ML
4 INJECTION, SOLUTION INTRAMUSCULAR; INTRAVENOUS
Status: COMPLETED | OUTPATIENT
Start: 2022-08-18 | End: 2022-08-18

## 2022-08-18 RX ORDER — HYDROMORPHONE HYDROCHLORIDE 2 MG/ML
1 INJECTION, SOLUTION INTRAMUSCULAR; INTRAVENOUS; SUBCUTANEOUS
Status: COMPLETED | OUTPATIENT
Start: 2022-08-18 | End: 2022-08-18

## 2022-08-18 RX ORDER — ENOXAPARIN SODIUM 100 MG/ML
1 INJECTION SUBCUTANEOUS
Status: DISCONTINUED | OUTPATIENT
Start: 2022-08-18 | End: 2022-08-19

## 2022-08-18 RX ORDER — MORPHINE SULFATE 4 MG/ML
4 INJECTION, SOLUTION INTRAMUSCULAR; INTRAVENOUS EVERY 4 HOURS PRN
Status: DISCONTINUED | OUTPATIENT
Start: 2022-08-18 | End: 2022-08-20 | Stop reason: HOSPADM

## 2022-08-18 RX ORDER — IBUPROFEN 200 MG
24 TABLET ORAL
Status: DISCONTINUED | OUTPATIENT
Start: 2022-08-18 | End: 2022-08-19

## 2022-08-18 RX ORDER — SODIUM CHLORIDE 0.9 % (FLUSH) 0.9 %
10 SYRINGE (ML) INJECTION EVERY 12 HOURS PRN
Status: DISCONTINUED | OUTPATIENT
Start: 2022-08-18 | End: 2022-08-20 | Stop reason: HOSPADM

## 2022-08-18 RX ORDER — IBUPROFEN 200 MG
24 TABLET ORAL
Status: DISCONTINUED | OUTPATIENT
Start: 2022-08-18 | End: 2022-08-18

## 2022-08-18 RX ORDER — MUPIROCIN 20 MG/G
OINTMENT TOPICAL 3 TIMES DAILY
Status: DISCONTINUED | OUTPATIENT
Start: 2022-08-18 | End: 2022-08-20 | Stop reason: HOSPADM

## 2022-08-18 RX ORDER — GLUCAGON 1 MG
1 KIT INJECTION
Status: DISCONTINUED | OUTPATIENT
Start: 2022-08-18 | End: 2022-08-19

## 2022-08-18 RX ORDER — SIMETHICONE 80 MG
1 TABLET,CHEWABLE ORAL 4 TIMES DAILY PRN
Status: DISCONTINUED | OUTPATIENT
Start: 2022-08-18 | End: 2022-08-20 | Stop reason: HOSPADM

## 2022-08-18 RX ORDER — GLUCAGON 1 MG
1 KIT INJECTION
Status: DISCONTINUED | OUTPATIENT
Start: 2022-08-18 | End: 2022-08-18

## 2022-08-18 RX ORDER — IBUPROFEN 200 MG
16 TABLET ORAL
Status: DISCONTINUED | OUTPATIENT
Start: 2022-08-18 | End: 2022-08-18

## 2022-08-18 RX ORDER — ENOXAPARIN SODIUM 100 MG/ML
100 INJECTION SUBCUTANEOUS
Status: COMPLETED | OUTPATIENT
Start: 2022-08-18 | End: 2022-08-18

## 2022-08-18 RX ADMIN — HYDROCODONE BITARTRATE AND ACETAMINOPHEN 1 TABLET: 5; 325 TABLET ORAL at 09:08

## 2022-08-18 RX ADMIN — ONDANSETRON 4 MG: 2 INJECTION INTRAMUSCULAR; INTRAVENOUS at 10:08

## 2022-08-18 RX ADMIN — SERTRALINE HYDROCHLORIDE 100 MG: 50 TABLET ORAL at 09:08

## 2022-08-18 RX ADMIN — HYDROMORPHONE HYDROCHLORIDE 1 MG: 2 INJECTION, SOLUTION INTRAMUSCULAR; INTRAVENOUS; SUBCUTANEOUS at 11:08

## 2022-08-18 RX ADMIN — ENOXAPARIN SODIUM 100 MG: 100 INJECTION SUBCUTANEOUS at 11:08

## 2022-08-18 RX ADMIN — HYDROXYCHLOROQUINE SULFATE 200 MG: 200 TABLET ORAL at 09:08

## 2022-08-18 RX ADMIN — SODIUM CHLORIDE 1000 ML: 9 INJECTION, SOLUTION INTRAVENOUS at 10:08

## 2022-08-18 RX ADMIN — CEFTRIAXONE SODIUM 1 G: 1 INJECTION, POWDER, FOR SOLUTION INTRAMUSCULAR; INTRAVENOUS at 11:08

## 2022-08-18 RX ADMIN — IOPAMIDOL 100 ML: 755 INJECTION, SOLUTION INTRAVENOUS at 03:08

## 2022-08-18 RX ADMIN — MUPIROCIN: 20 OINTMENT TOPICAL at 09:08

## 2022-08-18 RX ADMIN — MORPHINE SULFATE 4 MG: 4 INJECTION INTRAVENOUS at 10:08

## 2022-08-18 RX ADMIN — MORPHINE SULFATE 4 MG: 4 INJECTION INTRAVENOUS at 07:08

## 2022-08-18 RX ADMIN — NITROGLYCERIN 1 INCH: 20 OINTMENT TOPICAL at 11:08

## 2022-08-18 RX ADMIN — MORPHINE SULFATE 4 MG: 4 INJECTION INTRAVENOUS at 11:08

## 2022-08-18 NOTE — H&P
[facility] Hospital Medicine History & Physical Examination       Patient Name: Vanesa Ricks  MRN: [id number]  Patient Class: IP- Inpatient   Admission Date: 8/18/2022   Admitting Physician: LUIS Service   Length of Stay: 0  Attending Physician: Dr. HUMAIRA Lucas  Primary Care Provider: Brianna Riddle MD  Face-to-Face encounter date: 08/18/2022  Code Status: Full Code  Chief Complaint: Leg Pain (Pt to ER via POV for leg pain.  Also n/v.  Unable to take meds (including blood thinners).  Started 1 week ago.  Legs are discolored and right is more painful than left.  Pt was seen at [facility] last week for same and states they were unable to find a doppler pulse in feet, but was discharged anyway.)        Patient information was obtained from patient, patient's family, past medical records and ER records.     HISTORY OF PRESENT ILLNESS:   Vanesa Ricks is a 52 y.o. female who has a PMH which includes PAD/PVD, systemic lupus erythematosus, neuropathy, CHF, HLD, hypothyroidism, DM; presented to the ED at [facility] on 8/18/2022 with a primary complaint of N/V and associated leg pain. Pt also reports legs are discolored and more painful that her baseline. She reports waking up this am with red bumps on legs and redness to her feet.  She reports the symptoms started about a week ago and progressed. No reports of CP, SOB, diarrhea, fever, chills cough, congestion, or any sick contacts. Labs done unremarkable. Pt denies any urinary symptoms. US revealed bacteria, WBC, + LE, + nitrite, + blood. Xray to right foot; revealed soft tissue swelling and post-surgical changes without appreciable acute osseous abnormality. Read of US of BLE pending; CTA with runoff of BLE pending. Vascular services consulted per ED. PT is on blood thinners and has not been able to take blood thinners secondary to her N/V.  Pt is admitted to hospital medicine services for further management.  Vascular services  consulted.     PAST MEDICAL HISTORY:   PAD?PVD  Systemic Lupus  Neuropathy  CHF  HTN  HLD  DM    PAST SURGICAL HISTORY:   Hysterectomy  Angiogram  Fem pop  Cholecystectomy  Splenectomy  Abdominal arterial stent  Tonsillectomy  Appendectomy    ALLERGIES:   Heparin and Vancomycin    FAMILY HISTORY:   Reviewed and negative    SOCIAL HISTORY:     Social History     Tobacco Use    Smoking status: Current Some Day Smoker     Types: Cigarettes    Smokeless tobacco: Never Used    Tobacco comment: patient reports she smokes marijuana   Substance Use Topics    Alcohol use: Yes     Comment: socially        HOME MEDICATIONS:   As documented  Prior to Admission medications    Medication Sig Start Date End Date Taking? Authorizing Provider   baricitinib (OLUMIANT) 2 mg tablet Take 1 tablet (2 mg total) by mouth once daily. 7/21/22 9/19/22  Donell Flowers MD   belimumab (BENLYSTA) 200 mg/mL Syrg Inject 1 mL (200 mg total) into the skin every 7 days. 7/21/22 9/19/22  Donell Flowers MD   diphenhydrAMINE (BENADRYL) 25 mg capsule Take 25 mg by mouth. 6/23/21   Historical Provider   gabapentin (NEURONTIN) 300 MG capsule Take 1 capsule (300 mg total) by mouth once daily. 7/18/22   MICHAEL Lindo   HYDROcodone-acetaminophen (NORCO) 7.5-325 mg per tablet Take 1 tablet by mouth every 8 (eight) hours as needed. 5/3/22   Historical Provider   hydrOXYchloroQUINE (PLAQUENIL) 200 mg tablet Take 200 mg by mouth 2 (two) times daily. 11/29/21   Historical Provider   mupirocin (BACTROBAN) 2 % ointment Apply topically 3 (three) times daily. 6/14/22   Brianna Riddle MD   nitrofurantoin, macrocrystal-monohydrate, (MACROBID) 100 MG capsule Take 1 capsule (100 mg total) by mouth 2 (two) times daily. for 7 days 8/14/22 8/21/22  MARLEN Bravo   ondansetron (ZOFRAN-ODT) 4 MG TbDL Take 1 tablet (4 mg total) by mouth every 6 (six) hours as needed (nausea). 8/14/22   MARLEN Bravo   prasugreL (EFFIENT) 10 mg Tab Take 1 tablet  (10 mg total) by mouth once daily. 6/28/22   Brianna Riddle MD   predniSONE (DELTASONE) 1 MG tablet TAKE 5 MG (5 TABLETS) BY MOUTH EVERY MORNING WITH FOOD, THEN TAPER FROM 5 MG PER DAY, BY 1 MG EVERY MONTH. 11/29/21   Historical Provider   rivaroxaban (XARELTO) 10 mg Tab Take 10 mg by mouth. 8/24/21   Historical Provider   sertraline (ZOLOFT) 100 MG tablet Take 100 mg by mouth. 8/6/21 8/1/22  Historical Provider   triamcinolone acetonide 0.025% (KENALOG) 0.025 % cream Apply topically 2 (two) times daily. Apply small amount to rash, rubbing in well, avoid getting in eyes. 6/10/22   MARLEN Bravo       REVIEW OF SYSTEMS:   Except as documented, all other systems reviewed and negative     PHYSICAL EXAM:     VITAL SIGNS: 24 HRS MIN & MAX LAST   Temp  Min: 97.3 °F (36.3 °C)  Max: 97.3 °F (36.3 °C) 97.3 °F (36.3 °C)   BP  Min: 102/59  Max: 145/87 110/68   Pulse  Min: 55  Max: 93  (!) 55   Resp  Min: 14  Max: 20 16   SpO2  Min: 92 %  Max: 99 % 97 %       General appearance: chronically ill appearing looks older than stated age, non-toxic, no family at bedside  HENT: Atraumatic head. Moist mucous membranes of oral cavity.  Eyes: Normal extraocular movements.   Neck: Atraumatic, Supple.   Lungs: Clear to auscultation bilaterally. No wheezing present. Symmetrical expansion  Heart: Regular rate and rhythm. Cap refill brisk, peripheral pulses palpable  Abdomen: Soft, non-distended, non-tender. No rebound tenderness/guarding. Bowel sounds are normal.   Extremities: ONEAL; right great toe old amputation, left great toe partial amputation, generalized tenderness to RLE worse at right foot area  Skin: warm and dry, scattered erythematous skin eruptions papules to RLE and erythema to right foot; mild erythema to LLE with scattered scabbed over wounds, left face cheek erythematous plaque like skin eruption   Neuro: Motor and sensory exams grossly intact.no focal deficits  Psych/mental status: Appropriate mood and affect.  Responds appropriately to questions.     LABS AND IMAGING:     Recent Labs   Lab 08/14/22  1727 08/18/22  1055   WBC 7.4 8.0   RBC 4.02* 4.03*   HGB 12.9 12.7   HCT 37.5 40.4   MCV 93.3 100.2*   MCH 32.1* 31.5*   MCHC 34.4 31.4*   RDW 13.6 14.0    295   MPV 9.6 9.3       Recent Labs   Lab 08/14/22  1727 08/18/22  1055    140   K 4.3 4.2   CO2 24 16*   BUN 16.3 14.2   CREATININE 0.75 0.71   CALCIUM 9.3 8.9   ALBUMIN 3.3* 3.2*   ALKPHOS 161* 163*   ALT 52 50   AST 69* 61*   BILITOT 0.3 0.4       Microbiology Results (last 7 days)     Procedure Component Value Units Date/Time    Urine culture [[id number]] Collected: 08/18/22 1205    Order Status: Sent Specimen: Urine Updated: 08/18/22 1326           CV Ultrasound doppler arterial leg right  The right lower extremity demonstrated severe arterial flow reduction   consistent with inflow disease and an occluded fem-pop bypass graft.  The profunda artery is patent.  Dampened monophasic flow identified in the   posterior tibial artery.  No flow identified in the dorsalis pedis artery.  Patent left common femoral artery.  CV Ultrasound doppler venous DVT leg right  There was no evidence of deep or superficial vein thrombosis in the right   lower extremity.  X-Ray Foot Complete Right  Narrative: EXAMINATION:  XR FOOT COMPLETE 3 VIEW RIGHT    CLINICAL HISTORY:  . Pain in right foot    TECHNIQUE:  AP, lateral, and oblique views of the right foot were performed.    COMPARISON:  None    FINDINGS:  There are postsurgical changes of amputation of the 1st and 2nd rays at the metatarsophalangeal joints.  No fracture.  No appreciable erosions.  Dorsal calcaneal enthesophyte.    Mild forefoot soft tissue swelling.  No appreciable gas.  Impression: Soft tissue swelling and postsurgical changes without appreciable acute osseous abnormality.    Electronically signed by: Etelvina Cardenas  Date:    08/18/2022  Time:    10:54        ASSESSMENT & PLAN:   ASSESSMENT:  Acute RLE pain  and swelling  Acute right fem-pop graft occlusion  SLE- with skin eruptions  Nausea with vomiting  Acute cyctitis with hematuria- POA  HTN- essential  DM type II  CHF- no acute exacerbation  Chronic neuropathy  Weakness    PLAN:  Consult vascular services, appreciate assistance and recommendations.   CTA done and results are pending  IV hydration  Pt is allergic to heparin, she is on Xarelto and Effient at home, reports no bleeding  Labs in the am, monitor coagulation  Resume home medication once able to take PO  No abx at this time for cystitis as she has no symptoms, will follow  Acuchecks with sliding scale      VTE Prophylaxis: Lovenox for DVT prophylaxis and will be advised to be as mobile as possible  Patient condition:  Fair  __________________________________________________________________________  INPATIENT LIST OF MEDICATIONS     Scheduled Meds:see mar  Continuous Infusions:see mar  PRN Meds:.see mar      I, MICHAEL Covington have reviewed and discussed the case with Dr. IRMA Lucas.  Please see the following addendum for further assessment and plan from the attending MD.  MICHAEL Willams   08/18/2022

## 2022-08-18 NOTE — ED PROVIDER NOTES
Encounter Date: 8/18/2022    SCRIBE #1 NOTE: I, Uriel Juan, am scribing for, and in the presence of,  Alexy Coker MD. I have scribed the following portions of the note - Other sections scribed: HPI, ROS, PE.     History     Chief Complaint   Patient presents with    Leg Pain     Pt to ER via POV for leg pain.  Also n/v.  Unable to take meds (including blood thinners).  Started 1 week ago.  Legs are discolored and right is more painful than left.  Pt was seen at [facility] last week for same and states they were unable to find a doppler pulse in feet, but was discharged anyway.     51 y/o female with a history of peripheral artery disease presents to the ED with leg pain that has always been present but recently worsened in the past week, specifically in her right leg. She admits to nausea and vomiting that began Friday morning. She vomits when eating or swallowing her meds, so she has not been able to take her blood thinners prasugrel and xarelto. Pt also says that she woke up to red bumps on her right leg and redness on her right foot this morning. She denies fever and had her last bowel movement on Monday evening. Pt notes visiting [facility] last week where she was discharged.    The history is provided by the patient. No  was used.   Leg Pain   Illness onset: worsened 1 week ago. Pain location: both legs, but right leg is more painful than left.   Review of patient's allergies indicates:   Allergen Reactions    Heparin Anaphylaxis    Vancomycin      Other reaction(s): Blotchy, Hives     Past Medical History:   Diagnosis Date    PAD (peripheral artery disease)     Systemic lupus erythematosus, organ or system involvement unspecified      Past Surgical History:   Procedure Laterality Date    HYSTERECTOMY  07/01/2013     No family history on file.  Social History     Tobacco Use    Smoking status: Some Days     Types: Cigarettes    Smokeless tobacco: Never    Tobacco comments:     patient reports  she smokes marijuana   Substance Use Topics    Alcohol use: Yes     Comment: socially    Drug use: Never     Review of Systems   Constitutional:  Negative for chills, diaphoresis, fatigue and fever.   HENT:  Negative for congestion and trouble swallowing.    Eyes:  Negative for pain and discharge.   Respiratory:  Negative for cough and wheezing.    Cardiovascular:  Negative for chest pain and leg swelling.   Gastrointestinal:  Negative for abdominal pain, diarrhea, nausea and vomiting.   Genitourinary:  Negative for dysuria, flank pain, vaginal bleeding and vaginal discharge.   Musculoskeletal:  Negative for arthralgias.        Leg pain   Skin:  Negative for color change.        Red bumps to right leg, redness to right foot   Neurological:  Negative for syncope, speech difficulty and weakness.   Psychiatric/Behavioral:  Negative for agitation and confusion.    All other systems reviewed and are negative.    Physical Exam     Initial Vitals [08/18/22 1020]   BP Pulse Resp Temp SpO2   (!) 145/87 93 18 97.3 °F (36.3 °C) 99 %      MAP       --         Physical Exam    Nursing note and vitals reviewed.  HENT:   Head: Normocephalic.   Eyes: EOM are normal. Right eye exhibits no discharge. Left eye exhibits no discharge. No scleral icterus.   Neck: Neck supple.   Cardiovascular:  Normal rate, regular rhythm and normal heart sounds.           Pulmonary/Chest: Breath sounds normal. No stridor. No respiratory distress. She has no wheezes. She has no rhonchi. She has no rales.   Abdominal: She exhibits no distension. There is no abdominal tenderness.   Abdomen benign There is no rebound and no guarding.   Musculoskeletal:         General: No tenderness or edema. Normal range of motion.      Cervical back: Neck supple.      Comments: Missing great toe on right foot  Bilateral lower extremities chronic venous stasis changes       Neurological: She is alert and oriented to person, place, and time. She has normal strength.   Skin:  Skin is dry. No pallor.   Small erythematous papules to right leg  Increased redness to dorsal right foot   Psychiatric: She has a normal mood and affect. Her behavior is normal. Judgment and thought content normal.       ED Course   Procedures  Labs Reviewed   COMPREHENSIVE METABOLIC PANEL - Abnormal; Notable for the following components:       Result Value    Chloride 113 (*)     Carbon Dioxide 16 (*)     Protein Total 6.3 (*)     Albumin Level 3.2 (*)     Albumin/Globulin Ratio 1.0 (*)     Alkaline Phosphatase 163 (*)     Aspartate Aminotransferase 61 (*)     All other components within normal limits   APTT - Abnormal; Notable for the following components:    PTT 52.4 (*)     All other components within normal limits   URINALYSIS, REFLEX TO URINE CULTURE - Abnormal; Notable for the following components:    Protein, UA 2+ (*)     Blood, UA 1+ (*)     Nitrites, UA Positive (*)     Leukocyte Esterase, UA 1+ (*)     All other components within normal limits   CBC WITH DIFFERENTIAL - Abnormal; Notable for the following components:    RBC 4.03 (*)     .2 (*)     MCH 31.5 (*)     MCHC 31.4 (*)     All other components within normal limits   URINALYSIS, MICROSCOPIC - Abnormal; Notable for the following components:    WBC, UA 21 (*)     Bacteria, UA 1+ (*)     All other components within normal limits   PROTIME-INR - Normal   LACTIC ACID, PLASMA - Normal   LIPASE - Normal   SARS-COV-2 RNA AMPLIFICATION, QUAL - Normal   CBC W/ AUTO DIFFERENTIAL    Narrative:     The following orders were created for panel order CBC auto differential.  Procedure                               Abnormality         Status                     ---------                               -----------         ------                     CBC with Differential[[id number]]        Abnormal            Final result                 Please view results for these tests on the individual orders.   HEMOGLOBIN A1C          Imaging Results              CTA  Runoff ABD PEL Bilat Lower Ext (Final result)  Result time 08/18/22 16:29:11      Final result by Clarke Dwyer MD (08/18/22 16:29:11)                   Impression:      1. Right femoral to popliteal bypass graft occluded.  There is reconstitution of the trifurcation vessels with 2 vessel runoff on the right.  2. Left femoral to popliteal bypass widely patent though there may be some focal stenosis at the distal anastomosis.  Severe focal narrowing of the popliteal distal to the graft.  Three-vessel runoff on the left.  3. Suspected small right femoral pseudoaneurysm.  4. Occluded SMA stent.  5. Other findings above.      Electronically signed by: Clarke Dwyer  Date:    08/18/2022  Time:    16:29               Narrative:    EXAMINATION:  CTA RUNOFF ABD PEL BILAT LOWER EXT    CLINICAL HISTORY:  Claudication or leg ischemia;    TECHNIQUE:  Helical acquisition through the abdomen, pelvis and bilateral lower extremities without and with IV contrast targeting the arterial phase. 3D MIP reconstructions made available for review.  The DLP is 1360.  Automatic exposure control, adjustment of mA/kV or iterative reconstruction technique was used to reduce radiation.    COMPARISON:  26 December 2021    FINDINGS:  Abdominopelvic Vasculature:    No abdominal aortic aneurysm.  Mild atherosclerotic disease abdominal aorta.  Celiac widely patent.  SMA stent appears to be occluded distally.  ALDA is not visualized.  No significant renal artery narrowing.    Right lower extremity:    Widely patent common iliac with stent.  No significant internal or external iliac disease.  There is small common femoral pseudoaneurysm image 88 series 15 measuring 8 mm.  Deep femoral widely patent.  Native superficial femoral occluded.  Femoropopliteal bypass occluded.  Popliteal occluded.  There is reconstitution of the trifurcation vessels with posterior tibial and peroneal runoff.  The anterior tibial but comes poorly opacified distally.    Left  lower extremity:    Widely patent common iliac with stent.  Mild internal and external iliac disease.  Dilated common femoral may be postprocedural.  Deep femoral widely patent.  Native superficial femoral occluded.  Femoropopliteal bypass widely patent though there may be some stenosis at the distal anastomosis.  Severe focal popliteal narrowing image 172 series 15.  Three-vessel runoff.    Other Findings:    Lobular liver with hepatic steatosis.  Spleen is not seen.  No hydronephrosis.  No ascites.  No bowel obstruction.  Mild colonic diverticulosis.  Urinary bladder is not well distended.  There are no acute osseous findings.                                       X-Ray Foot Complete Right (Final result)  Result time 08/18/22 10:54:23      Final result by Etelvina Cardenas MD (08/18/22 10:54:23)                   Impression:      Soft tissue swelling and postsurgical changes without appreciable acute osseous abnormality.      Electronically signed by: Etelvina Cardenas  Date:    08/18/2022  Time:    10:54               Narrative:    EXAMINATION:  XR FOOT COMPLETE 3 VIEW RIGHT    CLINICAL HISTORY:  . Pain in right foot    TECHNIQUE:  AP, lateral, and oblique views of the right foot were performed.    COMPARISON:  None    FINDINGS:  There are postsurgical changes of amputation of the 1st and 2nd rays at the metatarsophalangeal joints.  No fracture.  No appreciable erosions.  Dorsal calcaneal enthesophyte.    Mild forefoot soft tissue swelling.  No appreciable gas.                                       Medications   sodium chloride 0.9% bolus 1,000 mL (0 mLs Intravenous Stopped 8/18/22 1204)   ondansetron injection 4 mg (4 mg Intravenous Given 8/18/22 1030)   morphine injection 4 mg (4 mg Intravenous Given 8/18/22 1030)   enoxaparin injection 100 mg (100 mg Subcutaneous Given 8/18/22 1130)   cefTRIAXone (ROCEPHIN) 1 g in dextrose 5 % in water (D5W) 5 % 50 mL IVPB (MB+) (0 g Intravenous Stopped 8/18/22 1204)    nitroGLYCERIN 2% TD oint ointment 1 inch (1 inch Transdermal Given 8/18/22 1145)   HYDROmorphone (PF) injection 1 mg (1 mg Intravenous Given 8/18/22 1145)   iopamidoL (ISOVUE-370) injection 100 mL (100 mLs Intravenous Given 8/18/22 1553)   ondansetron 4 mg/2 mL injection (4 mg  Given 8/19/22 0919)     Medical Decision Making:   Clinical Tests:   Lab Tests: Ordered and Reviewed  Radiological Study: Ordered and Reviewed        Scribe Attestation:   Scribe #1: I performed the above scribed service and the documentation accurately describes the services I performed. I attest to the accuracy of the note.    Attending Attestation:           Physician Attestation for Scribe:  Physician Attestation Statement for Scribe #1: I, Alexy Coker MD, reviewed documentation, as scribed by Uriel Juan in my presence, and it is both accurate and complete.           ED Course as of 10/01/22 0527   Thu Aug 18, 2022   1110 Paged Dr. Espinoza [JG]   [id number] Consult with Dr. Espinoza's nurse who says to give a CT of the abdomen with runoff to the lower extermities and give lovenox. [JG]   0061 Paged hospitalist [JG]      ED Course User Index  [JG] Uriel Juan             Clinical Impression:   Final diagnoses:  [M79.89] Right leg swelling  [M79.604] Right leg pain  [M79.671] Right foot pain  [I73.9] Arterial insufficiency of lower extremity        ED Disposition Condition    Admit                 Alexy Coker MD  10/01/22 0527

## 2022-08-18 NOTE — FIRST PROVIDER EVALUATION
"Medical screening exam completed.  I have conducted a focused provider triage encounter, findings are as follows:    Chief Complaint   Patient presents with    Leg Pain     Pt to ER via POV for leg pain.  Also n/v.  Unable to take meds (including blood thinners).  Started 1 week ago.  Legs are discolored and right is more painful than left.  Pt was seen at [facility] last week for same and states they were unable to find a doppler pulse in feet, but was discharged anyway.     Brief history of present illness:  52 y.o. female presents to the ED with n/v, and worsening RLE pain for the last week. Hx of PAD. Went to [facility] on Sunday for leg pain as well n/v, dc'ed home with UTI dx. Has not been able to take her medications including her blood thinners in the last week. Has redness and "dots" to RLE that started today as well. Sees Dr Villa and Dr Espinoza.    Vitals:    08/18/22 1020   BP: (!) 145/87   Pulse: 93   Resp: 18   Temp: 97.3 °F (36.3 °C)   TempSrc: Temporal   SpO2: 99%     Pertinent physical exam:  Awake, alert, ambulatory, non-labored respirations    Brief workup plan:  Labs, UA, US    Preliminary workup initiated; this workup will be continued and followed by the physician or advanced practice provider that is assigned to the patient when roomed.  "

## 2022-08-19 LAB
ALBUMIN SERPL-MCNC: 3.1 GM/DL (ref 3.5–5)
ALBUMIN/GLOB SERPL: 1.1 RATIO (ref 1.1–2)
ALP SERPL-CCNC: 153 UNIT/L (ref 40–150)
ALT SERPL-CCNC: 43 UNIT/L (ref 0–55)
AST SERPL-CCNC: 56 UNIT/L (ref 5–34)
BASOPHILS # BLD AUTO: 0.05 X10(3)/MCL (ref 0–0.2)
BASOPHILS NFR BLD AUTO: 0.6 %
BILIRUBIN DIRECT+TOT PNL SERPL-MCNC: 0.3 MG/DL
BUN SERPL-MCNC: 14.9 MG/DL (ref 9.8–20.1)
CALCIUM SERPL-MCNC: 8.7 MG/DL (ref 8.4–10.2)
CHLORIDE SERPL-SCNC: 109 MMOL/L (ref 98–107)
CO2 SERPL-SCNC: 21 MMOL/L (ref 22–29)
CREAT SERPL-MCNC: 0.78 MG/DL (ref 0.55–1.02)
EOSINOPHIL # BLD AUTO: 0.19 X10(3)/MCL (ref 0–0.9)
EOSINOPHIL NFR BLD AUTO: 2.4 %
ERYTHROCYTE [DISTWIDTH] IN BLOOD BY AUTOMATED COUNT: 14.5 % (ref 11.5–17)
GFR SERPLBLD CREATININE-BSD FMLA CKD-EPI: >60 MLS/MIN/1.73/M2
GLOBULIN SER-MCNC: 2.7 GM/DL (ref 2.4–3.5)
GLUCOSE SERPL-MCNC: 88 MG/DL (ref 74–100)
HCT VFR BLD AUTO: 38.5 % (ref 37–47)
HGB BLD-MCNC: 12.4 GM/DL (ref 12–16)
IMM GRANULOCYTES # BLD AUTO: 0.04 X10(3)/MCL (ref 0–0.04)
IMM GRANULOCYTES NFR BLD AUTO: 0.5 %
LYMPHOCYTES # BLD AUTO: 1.58 X10(3)/MCL (ref 0.6–4.6)
LYMPHOCYTES NFR BLD AUTO: 19.7 %
MCH RBC QN AUTO: 32.1 PG (ref 27–31)
MCHC RBC AUTO-ENTMCNC: 32.2 MG/DL (ref 33–36)
MCV RBC AUTO: 99.7 FL (ref 80–94)
MONOCYTES # BLD AUTO: 0.4 X10(3)/MCL (ref 0.1–1.3)
MONOCYTES NFR BLD AUTO: 5 %
NEUTROPHILS # BLD AUTO: 5.8 X10(3)/MCL (ref 2.1–9.2)
NEUTROPHILS NFR BLD AUTO: 71.8 %
NRBC BLD AUTO-RTO: 0 %
PLATELET # BLD AUTO: 282 X10(3)/MCL (ref 130–400)
PMV BLD AUTO: 10 FL (ref 7.4–10.4)
POCT GLUCOSE: 140 MG/DL (ref 70–110)
POTASSIUM SERPL-SCNC: 4.5 MMOL/L (ref 3.5–5.1)
PROT SERPL-MCNC: 5.8 GM/DL (ref 6.4–8.3)
RBC # BLD AUTO: 3.86 X10(6)/MCL (ref 4.2–5.4)
SODIUM SERPL-SCNC: 140 MMOL/L (ref 136–145)
WBC # SPEC AUTO: 8 X10(3)/MCL (ref 4.5–11.5)

## 2022-08-19 PROCEDURE — 63600175 PHARM REV CODE 636 W HCPCS: Performed by: NURSE PRACTITIONER

## 2022-08-19 PROCEDURE — 85025 COMPLETE CBC W/AUTO DIFF WBC: CPT | Performed by: NURSE PRACTITIONER

## 2022-08-19 PROCEDURE — 80053 COMPREHEN METABOLIC PANEL: CPT | Performed by: NURSE PRACTITIONER

## 2022-08-19 PROCEDURE — 63600175 PHARM REV CODE 636 W HCPCS: Performed by: INTERNAL MEDICINE

## 2022-08-19 PROCEDURE — 36415 COLL VENOUS BLD VENIPUNCTURE: CPT | Performed by: NURSE PRACTITIONER

## 2022-08-19 PROCEDURE — 11000001 HC ACUTE MED/SURG PRIVATE ROOM

## 2022-08-19 PROCEDURE — 25000003 PHARM REV CODE 250: Performed by: NURSE PRACTITIONER

## 2022-08-19 PROCEDURE — 25000003 PHARM REV CODE 250: Performed by: INTERNAL MEDICINE

## 2022-08-19 PROCEDURE — 63600175 PHARM REV CODE 636 W HCPCS

## 2022-08-19 RX ORDER — ONDANSETRON 2 MG/ML
4 INJECTION INTRAMUSCULAR; INTRAVENOUS EVERY 4 HOURS PRN
Status: DISCONTINUED | OUTPATIENT
Start: 2022-08-19 | End: 2022-08-20 | Stop reason: HOSPADM

## 2022-08-19 RX ORDER — ONDANSETRON 2 MG/ML
4 INJECTION INTRAMUSCULAR; INTRAVENOUS EVERY 6 HOURS PRN
Status: DISCONTINUED | OUTPATIENT
Start: 2022-08-19 | End: 2022-08-19

## 2022-08-19 RX ORDER — ONDANSETRON 2 MG/ML
INJECTION INTRAMUSCULAR; INTRAVENOUS
Status: COMPLETED
Start: 2022-08-19 | End: 2022-08-19

## 2022-08-19 RX ORDER — PRASUGREL 10 MG/1
10 TABLET, FILM COATED ORAL DAILY
Status: DISCONTINUED | OUTPATIENT
Start: 2022-08-19 | End: 2022-08-20 | Stop reason: HOSPADM

## 2022-08-19 RX ADMIN — HYDROCODONE BITARTRATE AND ACETAMINOPHEN 1 TABLET: 5; 325 TABLET ORAL at 09:08

## 2022-08-19 RX ADMIN — RIVAROXABAN 10 MG: 10 TABLET, FILM COATED ORAL at 08:08

## 2022-08-19 RX ADMIN — MORPHINE SULFATE 4 MG: 4 INJECTION INTRAVENOUS at 04:08

## 2022-08-19 RX ADMIN — ONDANSETRON 4 MG: 2 INJECTION INTRAMUSCULAR; INTRAVENOUS at 05:08

## 2022-08-19 RX ADMIN — MORPHINE SULFATE 4 MG: 4 INJECTION INTRAVENOUS at 09:08

## 2022-08-19 RX ADMIN — MUPIROCIN: 20 OINTMENT TOPICAL at 09:08

## 2022-08-19 RX ADMIN — ONDANSETRON 4 MG: 2 INJECTION INTRAMUSCULAR; INTRAVENOUS at 09:08

## 2022-08-19 RX ADMIN — SERTRALINE HYDROCHLORIDE 100 MG: 50 TABLET ORAL at 08:08

## 2022-08-19 RX ADMIN — ONDANSETRON 4 MG: 2 INJECTION INTRAMUSCULAR; INTRAVENOUS at 01:08

## 2022-08-19 RX ADMIN — HYDROXYCHLOROQUINE SULFATE 200 MG: 200 TABLET ORAL at 08:08

## 2022-08-19 RX ADMIN — HYDROCODONE BITARTRATE AND ACETAMINOPHEN 1 TABLET: 5; 325 TABLET ORAL at 11:08

## 2022-08-19 RX ADMIN — HYDROXYCHLOROQUINE SULFATE 200 MG: 200 TABLET ORAL at 09:08

## 2022-08-19 RX ADMIN — TRIAMCINOLONE ACETONIDE: 5 CREAM TOPICAL at 09:08

## 2022-08-19 RX ADMIN — MORPHINE SULFATE 4 MG: 4 INJECTION INTRAVENOUS at 05:08

## 2022-08-19 RX ADMIN — MORPHINE SULFATE 4 MG: 4 INJECTION INTRAVENOUS at 01:08

## 2022-08-19 RX ADMIN — ENOXAPARIN SODIUM 100 MG: 100 INJECTION SUBCUTANEOUS at 09:08

## 2022-08-19 RX ADMIN — GABAPENTIN 300 MG: 300 CAPSULE ORAL at 09:08

## 2022-08-19 RX ADMIN — PRASUGREL 10 MG: 10 TABLET, FILM COATED ORAL at 11:08

## 2022-08-19 NOTE — PROGRESS NOTES
[facility] Medicine Progress Note        Chief Complaint: Inpatient Follow-up for right leg pain and discoloration     HPI:    52-year-old female with known past medical history of peripheral arterial disease/peripheral vascular disease status post fem-pop bypass graft, systemic lupus erythematosus, neuropathy, hyperlipidemia, admitted 08/18/2022 with primary complaint of right leg pain started 1 week ago on Friday last week and also complains of nausea and vomiting unable to keep her medication down. Patient reports that at home she is on 2 blood thinners- Effient 10 mg daily and Xarelto 10 mg daily  Patient states that unable to take the blood thinner was not due to nausea and vomiting but it was that she could not swallow her pill and it would come back.  Patient reports in the last 7 days, she has taken only 2 doses.   Patient reports that the discoloration on the leg started Friday and started to become more and more painful.  Reports waking up with a red bumps on the discolored like and redness to her feet.  Patient denies any chest pain, shortness O breath, diarrhea, fever, chills, cough, any sick contacts.   In the ED CTA with runoff of bilateral lower extremities was done which showed occlusion of the right fem-pop bypass graft.  We were told that vascular has been consulted for no concern was placed in.  Consulted Dr. Feliz who informed us that the initial bypass was done by Dr. Espinoza so recommended we called Dr. Espinoza.  Consult placed in     Interval Hx:   Laying in bed, report vomiting 5 minutes after taking her pills this morning.  States was able to keep the general down.  Discussed awaiting vascular eval and recommendations.  Verbalized understanding  No family is at bedside  Case discussed with patient nurse on the floor    Objective/physical exam:  General:   laying comfortably in bed, afebrile.   Chest: Clear to auscultation bilaterally   Heart: S1, S2,  no appreciable murmur   Abdomen: Soft, nontender, BS +   MSK: Warm bilateral lower extremities, discoloration of both legs, tender right leg.  Multiple toe amputations noted on both legs.     Neurologic: Alert and oriented x4, moving all extremities with good strength      VITAL SIGNS: 24 HRS MIN & MAX LAST   Temp  Min: 97.9 °F (36.6 °C)  Max: 99.3 °F (37.4 °C) 97.9 °F (36.6 °C)   BP  Min: 101/55  Max: 140/72 119/76   Pulse  Min: 53  Max: 83  64   Resp  Min: 14  Max: 24 18   SpO2  Min: 90 %  Max: 96 % (!) 94 %       Recent Labs   Lab 08/14/22 1727 08/18/22  1055 08/19/22  0328   WBC 7.4 8.0 8.0   RBC 4.02* 4.03* 3.86*   HGB 12.9 12.7 12.4   HCT 37.5 40.4 38.5   MCV 93.3 100.2* 99.7*   MCH 32.1* 31.5* 32.1*   MCHC 34.4 31.4* 32.2*   RDW 13.6 14.0 14.5    295 282   MPV 9.6 9.3 10.0       Recent Labs   Lab 08/14/22 1727 08/18/22  1055 08/19/22  0328    140 140   K 4.3 4.2 4.5   CO2 24 16* 21*   BUN 16.3 14.2 14.9   CREATININE 0.75 0.71 0.78   CALCIUM 9.3 8.9 8.7   ALBUMIN 3.3* 3.2* 3.1*   ALKPHOS 161* 163* 153*   ALT 52 50 43   AST 69* 61* 56*   BILITOT 0.3 0.4 0.3          Microbiology Results (last 7 days)     Procedure Component Value Units Date/Time    Urine culture [[id number]] Collected: 08/18/22 1205    Order Status: Completed Specimen: Urine Updated: 08/19/22 0632     Urine Culture No Growth At 24 Hours           See below for Radiology    Scheduled Med:   gabapentin  300 mg Oral Daily    hydrOXYchloroQUINE  200 mg Oral BID    mupirocin   Topical (Top) TID    prasugreL  10 mg Oral Daily    rivaroxaban  10 mg Oral QHS    sertraline  100 mg Oral QHS    triamcinolone acetonide 0.5%   Topical (Top) BID        Continuous Infusions:       PRN Meds:  acetaminophen, acetaminophen, dextrose 10%, dextrose 10%, HYDROcodone-acetaminophen, melatonin, morphine, naloxone, ondansetron, simethicone, sodium chloride 0.9%       Assessment/Plan:  Acute RLE pain and swelling due to right fem-pop graft occlusion  - per Dr. Sarmiento, its not new and she has collaterals   SLE- with skin eruptions   Nausea with vomiting- e[pisodic   Acute cyctitis with hematuria- POA   Hx diabetes- no home diabetic meds on list - pt claims is prednisone related   HTN- essential   Non anion gap metabolic acidosis    CHF- no acute exacerbation   Chronic neuropathy   Current someday smoker           Admitted to hospital medicine team as inpatient on 8/18   Venous ultrasound bilateral lower extremity shows no evidence of deep or superficial vein thrombosis   Arterial ultrasound bilateral lower extremity showed right lower extremity with severe arterial flow reduction consistent with inflow disease and an occluded fem-pop bypass graft.  The profundus artery is patent.  No flow identified and dorsalis pedis artery.  Patent left common femoral artery   CTA runoff abdomen pelvis bilateral lower extremity shows right fem-pop bypass grafts occluded.  There is reconstitution of the trifurcation vessel with 2 vessels runoff on the right.  Left femoral to popliteal bypass widely patent with some focal stenosis at the distal anastomosis.  Severe focal narrowing of the popliteal distal to the graft.  Suspect small right femoral pseudoaneurysm. Occluded SMA stent.   Dr. Espinoza evaluated the patient, recommended no surgical intervention.  From his standpoint, patient is good for discharge home  Pt is allergic to heparin, received Lovenox therapeutic dose while awaiting vasc eval.  Now that no intervention is needed at this point, will resume her Xarelto and Effient  Will monitor patient till tomorrow morning if she tolerates and medications and food,  Cont IV hydration  till able to tolerate p.o.  Resume home medications for chronic medical conditions   No abx at this time for cystitis as she has no symptoms, will follow.  Noted patient was given 1 dose of ceftriaxone in the ED upon arrival   Urine cultures negative inter 24 hour  A1c 5.6, d/c insulin and  diabetic diet, start cardiac diet  Morning CBC CMP stable           VTE Prophylaxis:  Home medications Xarelto and Effient      Patient condition:  Stable      Discharge Planning and Disposition/Anticipated discharge:  Tomorrow if tolerates orally    All diagnosis and differential diagnosis have been reviewed; assessment and plan has been documented; I have personally reviewed the labs and test results that are presently available; I have reviewed the patients medication list; I have reviewed the consulting providers response and recommendations. I have reviewed or attempted to review medical records based upon their availability    All of the patient's questions have been  addressed and answered. Patient's is agreeable to the above stated plan. I will continue to monitor closely and make adjustments to medical management as needed.  _____________________________________________________________________    Nutrition Status:    Radiology:  CTA Runoff ABD PEL Bilat Lower Ext  Narrative: EXAMINATION:  CTA RUNOFF ABD PEL BILAT LOWER EXT    CLINICAL HISTORY:  Claudication or leg ischemia;    TECHNIQUE:  Helical acquisition through the abdomen, pelvis and bilateral lower extremities without and with IV contrast targeting the arterial phase. 3D MIP reconstructions made available for review.  The DLP is 1360.  Automatic exposure control, adjustment of mA/kV or iterative reconstruction technique was used to reduce radiation.    COMPARISON:  26 December 2021    FINDINGS:  Abdominopelvic Vasculature:    No abdominal aortic aneurysm.  Mild atherosclerotic disease abdominal aorta.  Celiac widely patent.  SMA stent appears to be occluded distally.  ALDA is not visualized.  No significant renal artery narrowing.    Right lower extremity:    Widely patent common iliac with stent.  No significant internal or external iliac disease.  There is small common femoral pseudoaneurysm image 88 series 15 measuring 8 mm.  Deep femoral widely  patent.  Native superficial femoral occluded.  Femoropopliteal bypass occluded.  Popliteal occluded.  There is reconstitution of the trifurcation vessels with posterior tibial and peroneal runoff.  The anterior tibial but comes poorly opacified distally.    Left lower extremity:    Widely patent common iliac with stent.  Mild internal and external iliac disease.  Dilated common femoral may be postprocedural.  Deep femoral widely patent.  Native superficial femoral occluded.  Femoropopliteal bypass widely patent though there may be some stenosis at the distal anastomosis.  Severe focal popliteal narrowing image 172 series 15.  Three-vessel runoff.    Other Findings:    Lobular liver with hepatic steatosis.  Spleen is not seen.  No hydronephrosis.  No ascites.  No bowel obstruction.  Mild colonic diverticulosis.  Urinary bladder is not well distended.  There are no acute osseous findings.  Impression: 1. Right femoral to popliteal bypass graft occluded.  There is reconstitution of the trifurcation vessels with 2 vessel runoff on the right.  2. Left femoral to popliteal bypass widely patent though there may be some focal stenosis at the distal anastomosis.  Severe focal narrowing of the popliteal distal to the graft.  Three-vessel runoff on the left.  3. Suspected small right femoral pseudoaneurysm.  4. Occluded SMA stent.  5. Other findings above.    Electronically signed by: Clarke Dwyer  Date:    08/18/2022  Time:    16:29  CV Ultrasound doppler arterial leg right  The right lower extremity demonstrated severe arterial flow reduction   consistent with inflow disease and an occluded fem-pop bypass graft.  The profunda artery is patent.  Dampened monophasic flow identified in the   posterior tibial artery.  No flow identified in the dorsalis pedis artery.  Patent left common femoral artery.  CV Ultrasound doppler venous DVT leg right  There was no evidence of deep or superficial vein thrombosis in the right   lower  extremity.  X-Ray Foot Complete Right  Narrative: EXAMINATION:  XR FOOT COMPLETE 3 VIEW RIGHT    CLINICAL HISTORY:  . Pain in right foot    TECHNIQUE:  AP, lateral, and oblique views of the right foot were performed.    COMPARISON:  None    FINDINGS:  There are postsurgical changes of amputation of the 1st and 2nd rays at the metatarsophalangeal joints.  No fracture.  No appreciable erosions.  Dorsal calcaneal enthesophyte.    Mild forefoot soft tissue swelling.  No appreciable gas.  Impression: Soft tissue swelling and postsurgical changes without appreciable acute osseous abnormality.    Electronically signed by: Etelvina Cardenas  Date:    08/18/2022  Time:    10:54      Roger Lucas MD   08/19/2022

## 2022-08-20 VITALS
BODY MASS INDEX: 32.2 KG/M2 | TEMPERATURE: 98 F | DIASTOLIC BLOOD PRESSURE: 59 MMHG | SYSTOLIC BLOOD PRESSURE: 97 MMHG | HEIGHT: 71 IN | WEIGHT: 230 LBS | HEART RATE: 67 BPM | OXYGEN SATURATION: 95 % | RESPIRATION RATE: 18 BRPM

## 2022-08-20 LAB — BACTERIA UR CULT: NORMAL

## 2022-08-20 PROCEDURE — 94761 N-INVAS EAR/PLS OXIMETRY MLT: CPT

## 2022-08-20 PROCEDURE — 25000003 PHARM REV CODE 250: Performed by: NURSE PRACTITIONER

## 2022-08-20 PROCEDURE — 25000003 PHARM REV CODE 250: Performed by: INTERNAL MEDICINE

## 2022-08-20 PROCEDURE — 63600175 PHARM REV CODE 636 W HCPCS: Performed by: INTERNAL MEDICINE

## 2022-08-20 RX ADMIN — MUPIROCIN: 20 OINTMENT TOPICAL at 08:08

## 2022-08-20 RX ADMIN — HYDROCODONE BITARTRATE AND ACETAMINOPHEN 1 TABLET: 5; 325 TABLET ORAL at 06:08

## 2022-08-20 RX ADMIN — HYDROXYCHLOROQUINE SULFATE 200 MG: 200 TABLET ORAL at 08:08

## 2022-08-20 RX ADMIN — PRASUGREL 10 MG: 10 TABLET, FILM COATED ORAL at 08:08

## 2022-08-20 RX ADMIN — TRIAMCINOLONE ACETONIDE: 5 CREAM TOPICAL at 08:08

## 2022-08-20 RX ADMIN — GABAPENTIN 300 MG: 300 CAPSULE ORAL at 08:08

## 2022-08-20 RX ADMIN — ONDANSETRON 4 MG: 2 INJECTION INTRAMUSCULAR; INTRAVENOUS at 06:08

## 2022-08-20 NOTE — DISCHARGE SUMMARY
[facility] Medicine Discharge Summary    Admit Date: 8/18/2022  Discharge Date and Time: 8/20/20228:08 AM  Admitting Physician:  Team  Discharging Physician: Roger Lucas MD.  Primary Care Physician: Brianna Riddle MD  Consults: Cardiothoracic/Vascular Surgery    Discharge Diagnoses:  Acute RLE pain and swelling due to right fem-pop graft occlusion - per Dr. Sarmiento, its not new and she has collaterals   SLE- with skin eruptions   Nausea with vomiting- e[pisodic   Acute cyctitis with hematuria- POA   Hx diabetes- no home diabetic meds on list - pt claims is prednisone related   HTN- essential   Non anion gap metabolic acidosis    CHF- no acute exacerbation   Chronic neuropathy   Current someday smoker     Hospital Course:   52-year-old female with known past medical history of peripheral arterial disease/peripheral vascular disease status post fem-pop bypass graft, systemic lupus erythematosus, neuropathy, hyperlipidemia, admitted 08/18/2022 with primary complaint of right leg pain started 1 week ago on Friday last week and also complains of nausea and vomiting unable to keep her medication down. Patient reports that at home she is on 2 blood thinners- Effient 10 mg daily and Xarelto 10 mg daily  Patient states that unable to take the blood thinner was not due to nausea and vomiting but it was that she could not swallow her pill and it would come back.  Patient reports in the last 7 days, she has taken only 2 doses.   Patient reports that the discoloration on the leg started Friday and started to become more and more painful.  Reports waking up with a red bumps on the discolored like and redness to her feet.  Patient denies any chest pain, shortness O breath, diarrhea, fever, chills, cough, any sick contacts.   In the ED CTA with runoff of bilateral lower extremities was done which showed occlusion of the right fem-pop bypass graft.  We were told that vascular has been  consulted for no concern was placed in.  Consulted Dr. Feliz who informed us that the initial bypass was done by Dr. Espinoza so recommended we called Dr. Espinoza.  Consult placed in   Admitted to hospital medicine team as inpatient on 8/18   Venous ultrasound bilateral lower extremity shows no evidence of deep or superficial vein thrombosis   Arterial ultrasound bilateral lower extremity showed right lower extremity with severe arterial flow reduction consistent with inflow disease and an occluded fem-pop bypass graft.  The profundus artery is patent.  No flow identified and dorsalis pedis artery.  Patent left common femoral artery   CTA runoff abdomen pelvis bilateral lower extremity shows right fem-pop bypass grafts occluded.  There is reconstitution of the trifurcation vessel with 2 vessels runoff on the right.  Left femoral to popliteal bypass widely patent with some focal stenosis at the distal anastomosis.  Severe focal narrowing of the popliteal distal to the graft.  Suspect small right femoral pseudoaneurysm. Occluded SMA stent.   Dr. Espinoza evaluated the patient, recommended no surgical intervention.  From his standpoint, patient is good for discharge home  Pt is allergic to heparin, received Lovenox therapeutic dose while awaiting vasc eval.  Now that no intervention is needed at this point, will resume her Xarelto and Effient  Will monitor patient till tomorrow morning if she tolerates and medications and food,  Cont IV hydration  till able to tolerate p.o.  Resume home medications for chronic medical conditions   No abx at this time for cystitis as she has no symptoms, will follow.  Noted patient was given 1 dose of ceftriaxone in the ED upon arrival   Urine cultures negative x48 hour  A1c 5.6, d/c insulin and diabetic diet, start cardiac diet  Pt has been tolerating her diet well. Advised to quit smoking with her advanced  PAD.   Also advised to follow with her PCP, her rheumatologist as well as  vascular     Pt was seen and examined on the day of discharge  Vitals:  VITAL SIGNS: 24 HRS MIN & MAX LAST   Temp  Min: 97.9 °F (36.6 °C)  Max: 98.4 °F (36.9 °C) 98.1 °F (36.7 °C)   BP  Min: 97/59  Max: 124/67 (!) 97/59   Pulse  Min: 62  Max: 74  67   Resp  Min: 16  Max: 20 18   SpO2  Min: 94 %  Max: 96 % 95 %       Physical Exam:  General:   laying comfortably in bed, afebrile.   Chest: Clear to auscultation bilaterally   Heart: S1, S2, no appreciable murmur   Abdomen: Soft, nontender, BS +   MSK: Warm bilateral lower extremities, discoloration of both legs, tender right leg.  Multiple toe amputations noted on both legs.     Neurologic: Alert and oriented x4, moving all extremities with good strength  Skin:  Rash on the face on the right    Procedures Performed: No admission procedures for hospital encounter.     Significant Diagnostic Studies: See Full reports for all details    Recent Labs   Lab 08/14/22 1727 08/18/22  1055 08/19/22  0328   WBC 7.4 8.0 8.0   RBC 4.02* 4.03* 3.86*   HGB 12.9 12.7 12.4   HCT 37.5 40.4 38.5   MCV 93.3 100.2* 99.7*   MCH 32.1* 31.5* 32.1*   MCHC 34.4 31.4* 32.2*   RDW 13.6 14.0 14.5    295 282   MPV 9.6 9.3 10.0       Recent Labs   Lab 08/14/22 1727 08/18/22  1055 08/19/22  0328    140 140   K 4.3 4.2 4.5   CO2 24 16* 21*   BUN 16.3 14.2 14.9   CREATININE 0.75 0.71 0.78   CALCIUM 9.3 8.9 8.7   ALBUMIN 3.3* 3.2* 3.1*   ALKPHOS 161* 163* 153*   ALT 52 50 43   AST 69* 61* 56*   BILITOT 0.3 0.4 0.3        Microbiology Results (last 7 days)     Procedure Component Value Units Date/Time    Urine culture [[id number]] Collected: 08/18/22 1205    Order Status: Completed Specimen: Urine Updated: 08/19/22 0632     Urine Culture No Growth At 24 Hours           CTA Runoff ABD PEL Bilat Lower Ext  Narrative: EXAMINATION:  CTA RUNOFF ABD PEL BILAT LOWER EXT    CLINICAL HISTORY:  Claudication or leg ischemia;    TECHNIQUE:  Helical acquisition through the abdomen, pelvis and bilateral  lower extremities without and with IV contrast targeting the arterial phase. 3D MIP reconstructions made available for review.  The DLP is 1360.  Automatic exposure control, adjustment of mA/kV or iterative reconstruction technique was used to reduce radiation.    COMPARISON:  26 December 2021    FINDINGS:  Abdominopelvic Vasculature:    No abdominal aortic aneurysm.  Mild atherosclerotic disease abdominal aorta.  Celiac widely patent.  SMA stent appears to be occluded distally.  ALDA is not visualized.  No significant renal artery narrowing.    Right lower extremity:    Widely patent common iliac with stent.  No significant internal or external iliac disease.  There is small common femoral pseudoaneurysm image 88 series 15 measuring 8 mm.  Deep femoral widely patent.  Native superficial femoral occluded.  Femoropopliteal bypass occluded.  Popliteal occluded.  There is reconstitution of the trifurcation vessels with posterior tibial and peroneal runoff.  The anterior tibial but comes poorly opacified distally.    Left lower extremity:    Widely patent common iliac with stent.  Mild internal and external iliac disease.  Dilated common femoral may be postprocedural.  Deep femoral widely patent.  Native superficial femoral occluded.  Femoropopliteal bypass widely patent though there may be some stenosis at the distal anastomosis.  Severe focal popliteal narrowing image 172 series 15.  Three-vessel runoff.    Other Findings:    Lobular liver with hepatic steatosis.  Spleen is not seen.  No hydronephrosis.  No ascites.  No bowel obstruction.  Mild colonic diverticulosis.  Urinary bladder is not well distended.  There are no acute osseous findings.  Impression: 1. Right femoral to popliteal bypass graft occluded.  There is reconstitution of the trifurcation vessels with 2 vessel runoff on the right.  2. Left femoral to popliteal bypass widely patent though there may be some focal stenosis at the distal anastomosis.  Severe  focal narrowing of the popliteal distal to the graft.  Three-vessel runoff on the left.  3. Suspected small right femoral pseudoaneurysm.  4. Occluded SMA stent.  5. Other findings above.    Electronically signed by: Clarke Dwyer  Date:    08/18/2022  Time:    16:29  CV Ultrasound doppler arterial leg right  The right lower extremity demonstrated severe arterial flow reduction   consistent with inflow disease and an occluded fem-pop bypass graft.  The profunda artery is patent.  Dampened monophasic flow identified in the   posterior tibial artery.  No flow identified in the dorsalis pedis artery.  Patent left common femoral artery.  CV Ultrasound doppler venous DVT leg right  There was no evidence of deep or superficial vein thrombosis in the right   lower extremity.  X-Ray Foot Complete Right  Narrative: EXAMINATION:  XR FOOT COMPLETE 3 VIEW RIGHT    CLINICAL HISTORY:  . Pain in right foot    TECHNIQUE:  AP, lateral, and oblique views of the right foot were performed.    COMPARISON:  None    FINDINGS:  There are postsurgical changes of amputation of the 1st and 2nd rays at the metatarsophalangeal joints.  No fracture.  No appreciable erosions.  Dorsal calcaneal enthesophyte.    Mild forefoot soft tissue swelling.  No appreciable gas.  Impression: Soft tissue swelling and postsurgical changes without appreciable acute osseous abnormality.    Electronically signed by: Etelvina Cardenas  Date:    08/18/2022  Time:    10:54         Medication List      CONTINUE taking these medications    BENLYSTA 200 mg/mL Syrg  Generic drug: belimumab  Inject 1 mL (200 mg total) into the skin every 7 days.     gabapentin 300 MG capsule  Commonly known as: NEURONTIN  Take 1 capsule (300 mg total) by mouth once daily.     HYDROcodone-acetaminophen 7.5-325 mg per tablet  Commonly known as: NORCO     hydrOXYchloroQUINE 200 mg tablet  Commonly known as: PLAQUENIL     mupirocin 2 % ointment  Commonly known as: BACTROBAN  Apply topically 3  (three) times daily.     nitrofurantoin (macrocrystal-monohydrate) 100 MG capsule  Commonly known as: MACROBID  Take 1 capsule (100 mg total) by mouth 2 (two) times daily. for 7 days     OLUMIANT 2 mg tablet  Generic drug: baricitinib  Take 1 tablet (2 mg total) by mouth once daily.     ondansetron 4 MG Tbdl  Commonly known as: ZOFRAN-ODT  Take 1 tablet (4 mg total) by mouth every 6 (six) hours as needed (nausea).     prasugreL 10 mg Tab  Commonly known as: EFFIENT  Take 1 tablet (10 mg total) by mouth once daily.     predniSONE 1 MG tablet  Commonly known as: DELTASONE     rivaroxaban 10 mg Tab  Commonly known as: XARELTO     triamcinolone acetonide 0.025% 0.025 % cream  Commonly known as: KENALOG  Apply topically 2 (two) times daily. Apply small amount to rash, rubbing in well, avoid getting in eyes.     ZOLOFT 100 MG tablet  Generic drug: sertraline        STOP taking these medications    diphenhydrAMINE 25 mg capsule  Commonly known as: BENADRYL             Explained in detail to the patient about the discharge plan, medications, and follow-up visits. Pt understands and agrees with the treatment plan  Discharge Disposition: Home or Self Care   Discharged Condition: stable  Diet-   Dietary Orders (From admission, onward)     Start     Ordered    08/19/22 1545  Diet Cardiac  Diet effective now         08/19/22 1544               Medications Per NH med rec  Activities as tolerated   Follow-up Information     Brianna Riddle MD. Schedule an appointment as soon as possible for a visit in 1 week(s).    Specialty: Family Medicine  Why: Post hospital discharge  Contact information:  [address]  [address]  [place] [facility] [address]  [phone]             Donell Flowers MD. Go to.    Specialty: Rheumatology  Why: As scheduled  Contact information:  [address]  [address]  [facility] [id number] [phone]             Francisco Espinoza MD. Schedule an appointment as soon as possible for a visit in 2  week(s).    Specialties: Cardiothoracic Surgery, Cardiology  Why: Post hospital discharge  Contact information:  [address] [place] [address] Drive  [address]  [facility] [address]  [phone]                       For further questions contact hospitalist office    Discharge time 34 minutes    For worsening symptoms, chest pain, shortness of breath, increased abdominal pain, high grade fever, stroke or stroke like symptoms, immediately go to the nearest Emergency Room or call 911 as soon as possible.      Roger Otero M.D on 8/20/2022. at 8:08 AM.

## 2022-08-22 ENCOUNTER — PATIENT OUTREACH (OUTPATIENT)
Dept: ADMINISTRATIVE | Facility: CLINIC | Age: 52
End: 2022-08-22
Payer: MEDICARE

## 2022-08-22 NOTE — PROGRESS NOTES
C3 nurse spoke with Vanesa Ricks for a TCC post hospital discharge follow up call. The patient has a scheduled [facility] appointment with Brianna Riddle MD   on 9/2/22 @ 9:00am.

## 2022-08-24 DIAGNOSIS — M32.9 LUPUS: ICD-10-CM

## 2022-08-24 DIAGNOSIS — G62.9 NEUROPATHY: Primary | ICD-10-CM

## 2022-08-24 RX ORDER — GABAPENTIN 300 MG/1
300 CAPSULE ORAL DAILY
Qty: 30 CAPSULE | Refills: 0 | Status: SHIPPED | OUTPATIENT
Start: 2022-08-24 | End: 2022-09-26 | Stop reason: SDUPTHER

## 2022-09-19 PROBLEM — Z00.00 MEDICARE ANNUAL WELLNESS VISIT, SUBSEQUENT: Status: RESOLVED | Noted: 2022-06-14 | Resolved: 2022-09-19

## 2022-09-26 DIAGNOSIS — G62.9 NEUROPATHY: ICD-10-CM

## 2022-09-26 RX ORDER — GABAPENTIN 300 MG/1
300 CAPSULE ORAL DAILY
Qty: 30 CAPSULE | Refills: 3 | Status: SHIPPED | OUTPATIENT
Start: 2022-09-26 | End: 2023-05-05 | Stop reason: SDUPTHER

## 2022-09-26 NOTE — TELEPHONE ENCOUNTER
----- Message from Veronique Russo sent at 9/26/2022  9:53 AM CDT -----  Regarding: Med Refill  .Type:  RX Refill Request    Who Called: Pt  Refill or New Rx:Refill  RX Name and Strength:gabapentin (NEURONTIN) 300 MG capsule  How is the patient currently taking it? (ex. 1XDay):1xday  Is this a 30 day or 90 day RX:30  Preferred Pharmacy with phone number:[id number]  [place] - [address]  Local or Mail Order:Local  Ordering Provider:Janessa  Would the patient rather a call back or a response via [facility]? Call back  Best Call Back Number:[phone]  Additional Information:

## 2022-09-30 DIAGNOSIS — M32.9 LUPUS: ICD-10-CM

## 2022-09-30 RX ORDER — BELIMUMAB 200 MG/ML
200 SOLUTION SUBCUTANEOUS WEEKLY
Qty: 4 EACH | Refills: 11 | Status: SHIPPED | OUTPATIENT
Start: 2022-09-30 | End: 2023-05-05 | Stop reason: SDUPTHER

## 2022-10-11 ENCOUNTER — HOSPITAL ENCOUNTER (EMERGENCY)
Facility: HOSPITAL | Age: 52
Discharge: HOME | End: 2022-10-11
Attending: STUDENT IN AN ORGANIZED HEALTH CARE EDUCATION/TRAINING PROGRAM
Payer: MEDICARE

## 2022-10-11 VITALS
TEMPERATURE: 97 F | OXYGEN SATURATION: 95 % | BODY MASS INDEX: 33.15 KG/M2 | WEIGHT: 236.75 LBS | HEIGHT: 71 IN | DIASTOLIC BLOOD PRESSURE: 79 MMHG | RESPIRATION RATE: 20 BRPM | HEART RATE: 86 BPM | SYSTOLIC BLOOD PRESSURE: 151 MMHG

## 2022-10-11 DIAGNOSIS — R05.9 COUGH: ICD-10-CM

## 2022-10-11 DIAGNOSIS — J44.1 COPD EXACERBATION: ICD-10-CM

## 2022-10-11 DIAGNOSIS — J06.9 UPPER RESPIRATORY TRACT INFECTION, UNSPECIFIED TYPE: ICD-10-CM

## 2022-10-11 DIAGNOSIS — B34.9 NONSPECIFIC SYNDROME SUGGESTIVE OF VIRAL ILLNESS: Primary | ICD-10-CM

## 2022-10-11 LAB
ALBUMIN SERPL-MCNC: 3.7 GM/DL (ref 3.5–5)
ALBUMIN/GLOB SERPL: 1.2 RATIO (ref 1.1–2)
ALP SERPL-CCNC: 131 UNIT/L (ref 40–150)
ALT SERPL-CCNC: 34 UNIT/L (ref 0–55)
APPEARANCE UR: CLEAR
AST SERPL-CCNC: 45 UNIT/L (ref 5–34)
BACTERIA #/AREA URNS AUTO: ABNORMAL /HPF
BASOPHILS # BLD AUTO: 0.06 X10(3)/MCL (ref 0–0.2)
BASOPHILS NFR BLD AUTO: 0.4 %
BILIRUB UR QL STRIP.AUTO: NEGATIVE MG/DL
BILIRUBIN DIRECT+TOT PNL SERPL-MCNC: 0.5 MG/DL
BUN SERPL-MCNC: 15.8 MG/DL (ref 9.8–20.1)
CALCIUM SERPL-MCNC: 9.1 MG/DL (ref 8.4–10.2)
CHLORIDE SERPL-SCNC: 107 MMOL/L (ref 98–107)
CO2 SERPL-SCNC: 20 MMOL/L (ref 22–29)
COLOR UR AUTO: ABNORMAL
CREAT SERPL-MCNC: 0.82 MG/DL (ref 0.55–1.02)
EOSINOPHIL # BLD AUTO: 0.24 X10(3)/MCL (ref 0–0.9)
EOSINOPHIL NFR BLD AUTO: 1.7 %
ERYTHROCYTE [DISTWIDTH] IN BLOOD BY AUTOMATED COUNT: 12.8 % (ref 11.5–17)
FLUAV AG UPPER RESP QL IA.RAPID: NOT DETECTED
FLUBV AG UPPER RESP QL IA.RAPID: NOT DETECTED
GFR SERPLBLD CREATININE-BSD FMLA CKD-EPI: >60 MLS/MIN/1.73/M2
GLOBULIN SER-MCNC: 3.2 GM/DL (ref 2.4–3.5)
GLUCOSE SERPL-MCNC: 102 MG/DL (ref 74–100)
GLUCOSE UR QL STRIP.AUTO: NORMAL MG/DL
HCT VFR BLD AUTO: 40.4 % (ref 37–47)
HGB BLD-MCNC: 13.6 GM/DL (ref 12–16)
HYALINE CASTS #/AREA URNS LPF: ABNORMAL /LPF
IMM GRANULOCYTES # BLD AUTO: 0.06 X10(3)/MCL (ref 0–0.04)
IMM GRANULOCYTES NFR BLD AUTO: 0.4 %
KETONES UR QL STRIP.AUTO: NEGATIVE MG/DL
LEUKOCYTE ESTERASE UR QL STRIP.AUTO: NEGATIVE UNIT/L
LYMPHOCYTES # BLD AUTO: 2.07 X10(3)/MCL (ref 0.6–4.6)
LYMPHOCYTES NFR BLD AUTO: 15.1 %
MCH RBC QN AUTO: 31.4 PG (ref 27–31)
MCHC RBC AUTO-ENTMCNC: 33.7 MG/DL (ref 33–36)
MCV RBC AUTO: 93.3 FL (ref 80–94)
MONOCYTES # BLD AUTO: 0.5 X10(3)/MCL (ref 0.1–1.3)
MONOCYTES NFR BLD AUTO: 3.6 %
MUCOUS THREADS URNS QL MICRO: ABNORMAL /LPF
NEUTROPHILS # BLD AUTO: 10.8 X10(3)/MCL (ref 2.1–9.2)
NEUTROPHILS NFR BLD AUTO: 78.8 %
NITRITE UR QL STRIP.AUTO: NEGATIVE
NRBC BLD AUTO-RTO: 0 %
PH UR STRIP.AUTO: 6 [PH]
PLATELET # BLD AUTO: 265 X10(3)/MCL (ref 130–400)
PMV BLD AUTO: 9.9 FL (ref 7.4–10.4)
POTASSIUM SERPL-SCNC: 4.4 MMOL/L (ref 3.5–5.1)
PROT SERPL-MCNC: 6.9 GM/DL (ref 6.4–8.3)
PROT UR QL STRIP.AUTO: ABNORMAL MG/DL
RBC # BLD AUTO: 4.33 X10(6)/MCL (ref 4.2–5.4)
RBC #/AREA URNS AUTO: ABNORMAL /HPF
RBC UR QL AUTO: ABNORMAL UNIT/L
SARS-COV-2 RNA RESP QL NAA+PROBE: NOT DETECTED
SODIUM SERPL-SCNC: 140 MMOL/L (ref 136–145)
SP GR UR STRIP.AUTO: 1.01
SQUAMOUS #/AREA URNS LPF: ABNORMAL /HPF
STREP A PCR (OHS): NOT DETECTED
UROBILINOGEN UR STRIP-ACNC: NORMAL MG/DL
WBC # SPEC AUTO: 13.7 X10(3)/MCL (ref 4.5–11.5)
WBC #/AREA URNS AUTO: ABNORMAL /HPF

## 2022-10-11 PROCEDURE — 0240U COVID/FLU A&B PCR: CPT | Performed by: STUDENT IN AN ORGANIZED HEALTH CARE EDUCATION/TRAINING PROGRAM

## 2022-10-11 PROCEDURE — 25000003 PHARM REV CODE 250: Performed by: STUDENT IN AN ORGANIZED HEALTH CARE EDUCATION/TRAINING PROGRAM

## 2022-10-11 PROCEDURE — 36415 COLL VENOUS BLD VENIPUNCTURE: CPT | Performed by: STUDENT IN AN ORGANIZED HEALTH CARE EDUCATION/TRAINING PROGRAM

## 2022-10-11 PROCEDURE — 80053 COMPREHEN METABOLIC PANEL: CPT | Performed by: STUDENT IN AN ORGANIZED HEALTH CARE EDUCATION/TRAINING PROGRAM

## 2022-10-11 PROCEDURE — 87651 STREP A DNA AMP PROBE: CPT | Performed by: STUDENT IN AN ORGANIZED HEALTH CARE EDUCATION/TRAINING PROGRAM

## 2022-10-11 PROCEDURE — 81001 URINALYSIS AUTO W/SCOPE: CPT | Performed by: STUDENT IN AN ORGANIZED HEALTH CARE EDUCATION/TRAINING PROGRAM

## 2022-10-11 PROCEDURE — 85025 COMPLETE CBC W/AUTO DIFF WBC: CPT | Performed by: STUDENT IN AN ORGANIZED HEALTH CARE EDUCATION/TRAINING PROGRAM

## 2022-10-11 PROCEDURE — 99284 EMERGENCY DEPT VISIT MOD MDM: CPT | Mod: 25,CS

## 2022-10-11 RX ORDER — CETIRIZINE HYDROCHLORIDE 10 MG/1
10 TABLET ORAL DAILY
Qty: 30 TABLET | Refills: 0 | Status: SHIPPED | OUTPATIENT
Start: 2022-10-11 | End: 2023-09-05

## 2022-10-11 RX ORDER — ACETAMINOPHEN 500 MG
1000 TABLET ORAL
Status: COMPLETED | OUTPATIENT
Start: 2022-10-11 | End: 2022-10-11

## 2022-10-11 RX ORDER — BENZONATATE 100 MG/1
100 CAPSULE ORAL 3 TIMES DAILY PRN
Qty: 20 CAPSULE | Refills: 0 | Status: SHIPPED | OUTPATIENT
Start: 2022-10-11 | End: 2022-10-21

## 2022-10-11 RX ORDER — DOXYCYCLINE 100 MG/1
100 CAPSULE ORAL 2 TIMES DAILY
Qty: 20 CAPSULE | Refills: 0 | Status: SHIPPED | OUTPATIENT
Start: 2022-10-11 | End: 2022-10-21

## 2022-10-11 RX ORDER — KETOROLAC TROMETHAMINE 10 MG/1
10 TABLET, FILM COATED ORAL
Status: COMPLETED | OUTPATIENT
Start: 2022-10-11 | End: 2022-10-11

## 2022-10-11 RX ORDER — FLUTICASONE PROPIONATE 50 MCG
1 SPRAY, SUSPENSION (ML) NASAL 2 TIMES DAILY PRN
Qty: 16 G | Refills: 0 | Status: SHIPPED | OUTPATIENT
Start: 2022-10-11 | End: 2022-12-28

## 2022-10-11 RX ADMIN — KETOROLAC TROMETHAMINE 10 MG: 10 TABLET, FILM COATED ORAL at 09:10

## 2022-10-11 RX ADMIN — ACETAMINOPHEN 1000 MG: 500 TABLET ORAL at 09:10

## 2022-10-12 NOTE — ED PROVIDER NOTES
Encounter Date: 10/11/2022       History     Chief Complaint   Patient presents with    Fever     Fever, cough, congestion, throat and body aches.      52-year-old female with significant history of lupus presents for fever cough congestion. states over the last day she has not felt well. reports baseline COPD as well and has a new productive sputum.  Has nasal congestion and an intermittent low-grade fever at home. endorses a mild sore throat. Also has some vague chest congestion.  Denies any pleuritic component, no significant shortness of breath.  No baseline oxygen requirement.  Normal urination and bowel output.  Does take prednisone 10 mg daily.  Compliant with her medications recently.  Denies taking any medications for HEENT relief.  No other complaints or concerns at this time.    Review of patient's allergies indicates:   Allergen Reactions    Heparin Anaphylaxis    Vancomycin      Other reaction(s): Blotchy, Hives     Past Medical History:   Diagnosis Date    PAD (peripheral artery disease)     Systemic lupus erythematosus, organ or system involvement unspecified      Past Surgical History:   Procedure Laterality Date    HYSTERECTOMY  07/01/2013     No family history on file.  Social History     Tobacco Use    Smoking status: Some Days     Types: Cigarettes    Smokeless tobacco: Never    Tobacco comments:     patient reports she smokes marijuana   Substance Use Topics    Alcohol use: Yes     Comment: socially    Drug use: Never     Review of Systems   Constitutional:  Positive for chills, fatigue and fever. Negative for diaphoresis.   HENT:  Positive for congestion, rhinorrhea and sore throat. Negative for drooling, ear discharge, ear pain, sinus pressure, sinus pain, tinnitus, trouble swallowing and voice change.    Eyes:  Negative for pain, discharge and itching.   Respiratory:  Positive for cough. Negative for chest tightness and shortness of breath.    Cardiovascular:  Negative for chest  pain and palpitations.   Gastrointestinal:  Negative for abdominal pain, nausea and vomiting.   Genitourinary:  Negative for dysuria, flank pain and hematuria.   Musculoskeletal:  Negative for back pain and myalgias.   Skin:  Negative for color change and rash.   Neurological:  Negative for dizziness, weakness and headaches.   Psychiatric/Behavioral:  Negative for confusion. The patient is not hyperactive.      Physical Exam     Initial Vitals [10/11/22 2031]   BP Pulse Resp Temp SpO2   (!) 151/79 86 20 97 °F (36.1 °C) 95 %      MAP       --         Physical Exam    Vitals reviewed.  Constitutional: She appears well-developed and well-nourished. She is not diaphoretic. No distress.   HENT:   Head: Normocephalic and atraumatic.   Right Ear: Tympanic membrane and ear canal normal.   Left Ear: Tympanic membrane and ear canal normal.   Nose: Mucosal edema and rhinorrhea present. Right sinus exhibits no maxillary sinus tenderness and no frontal sinus tenderness. Left sinus exhibits no maxillary sinus tenderness and no frontal sinus tenderness.   Mouth/Throat: Uvula is midline, oropharynx is clear and moist and mucous membranes are normal. No uvula swelling. No oropharyngeal exudate, posterior oropharyngeal edema, posterior oropharyngeal erythema or tonsillar abscesses.   Eyes: Conjunctivae and EOM are normal. Pupils are equal, round, and reactive to light.   Neck: Neck supple. No tracheal deviation present.   Normal range of motion.  Cardiovascular:  Normal rate, regular rhythm, normal heart sounds and intact distal pulses.           Pulmonary/Chest: No accessory muscle usage. No tachypnea. No respiratory distress. She has decreased breath sounds. She has wheezes. She has no rhonchi. She has no rales.   Abdominal: Abdomen is soft. There is no abdominal tenderness. There is no rebound and no guarding.   Musculoskeletal:         General: Normal range of motion.      Cervical back: Normal range of motion and neck supple.      Neurological: She is alert and oriented to person, place, and time. She has normal strength. GCS score is 15. GCS eye subscore is 4. GCS verbal subscore is 5. GCS motor subscore is 6.   Skin: Skin is warm and dry. Capillary refill takes less than 2 seconds. No rash noted.   Psychiatric: She has a normal mood and affect. Her behavior is normal. Judgment and thought content normal.       ED Course   Procedures  Labs Reviewed   COMPREHENSIVE METABOLIC PANEL - Abnormal; Notable for the following components:       Result Value    Carbon Dioxide 20 (*)     Glucose Level 102 (*)     Aspartate Aminotransferase 45 (*)     All other components within normal limits   URINALYSIS, REFLEX TO URINE CULTURE - Abnormal; Notable for the following components:    Protein, UA 2+ (*)     Blood, UA 3+ (*)     Squamous Epithelial Cells, UA Trace (*)     Mucous, UA Trace (*)     RBC, UA 6-10 (*)     All other components within normal limits   CBC WITH DIFFERENTIAL - Abnormal; Notable for the following components:    WBC 13.7 (*)     MCH 31.4 (*)     Neut # 10.8 (*)     IG# 0.06 (*)     All other components within normal limits   COVID/FLU A&B PCR - Normal   STREP GROUP A BY PCR - Normal   CBC W/ AUTO DIFFERENTIAL    Narrative:     The following orders were created for panel order CBC auto differential.  Procedure                               Abnormality         Status                     ---------                               -----------         ------                     CBC with Differential[[id number]]        Abnormal            Final result                 Please view results for these tests on the individual orders.   EXTRA TUBES    Narrative:     The following orders were created for panel order EXTRA TUBES.  Procedure                               Abnormality         Status                     ---------                               -----------         ------                     Light Blue Top Hold[[id number]]                               In process                 Red Top Hold[[id number]]                                     In process                   Please view results for these tests on the individual orders.   LIGHT BLUE TOP HOLD   RED TOP HOLD          Imaging Results              X-Ray Chest PA And Lateral (Final result)  Result time 10/11/22 21:46:42      Final result by Gorge Leiva MD (10/11/22 21:46:42)                   Impression:      No acute cardiopulmonary process identified.      Electronically signed by: Gorge Leiva  Date:    10/11/2022  Time:    21:46               Narrative:    EXAMINATION:  XR CHEST PA AND LATERAL    CLINICAL HISTORY:  Cough, unspecified    TECHNIQUE:  Two views    COMPARISON:  October 21, 2021.    FINDINGS:  Cardiopericardial silhouette is within normal limits. Lungs are without dense focal or segmental consolidation, congestion, pleural effusion or pneumothorax.                                       Medications   acetaminophen tablet 1,000 mg (1,000 mg Oral Given 10/11/22 2138)   ketorolac tablet 10 mg (10 mg Oral Given 10/11/22 2138)     Medical Decision Making:   Clinical Tests:   Lab Tests: Ordered and Reviewed  Radiological Study: Reviewed and Ordered  ED Management:  52-year-old female presents to ED for multiple medical complaints including subjective fevers and chills, nasal discharge, sore throat and nonproductive cough. In department vitals grossly stable.  No tachypnea or fever. No oxygen requirement.  Reports significant history of COPD.  On baseline steroids.  Exam demonstrates rhinorrhea and nasal mucosal edema. Has not yet attempted over-the-counter treatments at this time.  Additionally has mild diminished breath sounds bilaterally with minimal wheeze. Laboratory assessment grossly unremarkable with mild expected leukocytosis secondary to chronic steroid use.  Urine clear, electrolytes benign.  Strep and viral panel negative.  Chest x-ray formally interpreted negative per Radiology.  Given medications in department which pt voiced initial relief to.  At this time will continue symptomatic and supportive care at home.  Did provide antihistamine and nasal spray prescription with antitussive.  Secondary to history of COPD and new pulmonary findings I did cover with antibiotics.  Patient requires close outpatient follow-up and provided very strict return precautions.  Voiced understanding and ultimately released. (Kaia)                        Clinical Impression:   Final diagnoses:  [R05.9] Cough  [B34.9] Nonspecific syndrome suggestive of viral illness (Primary)  [J06.9] Upper respiratory tract infection, unspecified type  [J44.1] COPD exacerbation        ED Disposition Condition    Discharge Stable          ED Prescriptions       Medication Sig Dispense Start Date End Date Auth. Provider    doxycycline (VIBRAMYCIN) 100 MG Cap () Take 1 capsule (100 mg total) by mouth 2 (two) times daily. for 10 days 20 capsule 10/11/2022 10/21/2022 Dominick Marti MD    benzonatate (TESSALON) 100 MG capsule () Take 1 capsule (100 mg total) by mouth 3 (three) times daily as needed for Cough. 20 capsule 10/11/2022 10/21/2022 Dominick Marti MD    cetirizine (ZYRTEC) 10 MG tablet Take 1 tablet (10 mg total) by mouth once daily. 30 tablet 10/11/2022 11/10/2022 Dominick Marti MD    fluticasone propionate (FLONASE) 50 mcg/actuation nasal spray 1 spray (50 mcg total) by Each Nostril route 2 (two) times daily as needed for Rhinitis. 16 g 10/11/2022 -- Dominick Marti MD          Follow-up Information       Follow up With Specialties Details Why Contact Info    Brianna Riddle MD Family Medicine Schedule an appointment as soon as possible for a visit in 3 days  [address]  [address]  [facility] [address]  [phone]      [facility] Emergency Medicine  As needed, If symptoms worsen [address] [address] [phone]             Dominick Marti  MD  11/03/22 0122

## 2022-12-21 RX ORDER — SERTRALINE HYDROCHLORIDE 100 MG/1
100 TABLET, FILM COATED ORAL DAILY
Qty: 30 TABLET | Refills: 11 | Status: SHIPPED | OUTPATIENT
Start: 2022-12-21 | End: 2024-03-06 | Stop reason: SDUPTHER

## 2022-12-21 NOTE — TELEPHONE ENCOUNTER
----- Message from Kimberly Carlson sent at 12/21/2022  1:27 PM CST -----  Regarding: med refill  .Type:  RX Refill Request    Who Called: pt   Refill or New Rx:refill   RX Name and Strength:sertraline (ZOLOFT) 100 MG tablet  How is the patient currently taking it? (ex. 1XDay):1xday   Is this a 30 day or 90 day RX:  [facility] Pharm[facility] with phone number[id number] [address]  Local or Mail Order:local   Ordering Provider:Janessa   Would the patient rather a call back or a response via [facility]? Call back   Best Call Back Number:[phone]  Additional Information:

## 2022-12-28 ENCOUNTER — OFFICE VISIT (OUTPATIENT)
Dept: FAMILY MEDICINE | Facility: CLINIC | Age: 52
End: 2022-12-28
Payer: MEDICARE

## 2022-12-28 VITALS
DIASTOLIC BLOOD PRESSURE: 76 MMHG | BODY MASS INDEX: 33.22 KG/M2 | TEMPERATURE: 98 F | SYSTOLIC BLOOD PRESSURE: 124 MMHG | OXYGEN SATURATION: 97 % | HEIGHT: 71 IN | HEART RATE: 73 BPM | WEIGHT: 237.31 LBS | RESPIRATION RATE: 16 BRPM

## 2022-12-28 DIAGNOSIS — I73.9 PERIPHERAL VASCULAR DISEASE: ICD-10-CM

## 2022-12-28 DIAGNOSIS — G89.29 OTHER CHRONIC PAIN: ICD-10-CM

## 2022-12-28 DIAGNOSIS — Z74.09 IMPAIRED MOBILITY: ICD-10-CM

## 2022-12-28 DIAGNOSIS — Z12.31 BREAST CANCER SCREENING BY MAMMOGRAM: ICD-10-CM

## 2022-12-28 DIAGNOSIS — F17.210 CIGARETTE SMOKER: ICD-10-CM

## 2022-12-28 DIAGNOSIS — J40 BRONCHITIS: Primary | ICD-10-CM

## 2022-12-28 PROCEDURE — 1159F MED LIST DOCD IN RCRD: CPT | Mod: CPTII,,, | Performed by: FAMILY MEDICINE

## 2022-12-28 PROCEDURE — 1160F RVW MEDS BY RX/DR IN RCRD: CPT | Mod: CPTII,,, | Performed by: FAMILY MEDICINE

## 2022-12-28 PROCEDURE — 3078F DIAST BP <80 MM HG: CPT | Mod: CPTII,,, | Performed by: FAMILY MEDICINE

## 2022-12-28 PROCEDURE — 1159F PR MEDICATION LIST DOCUMENTED IN MEDICAL RECORD: ICD-10-PCS | Mod: CPTII,,, | Performed by: FAMILY MEDICINE

## 2022-12-28 PROCEDURE — 3074F SYST BP LT 130 MM HG: CPT | Mod: CPTII,,, | Performed by: FAMILY MEDICINE

## 2022-12-28 PROCEDURE — 99214 OFFICE O/P EST MOD 30 MIN: CPT | Mod: ,,, | Performed by: FAMILY MEDICINE

## 2022-12-28 PROCEDURE — 3008F BODY MASS INDEX DOCD: CPT | Mod: CPTII,,, | Performed by: FAMILY MEDICINE

## 2022-12-28 PROCEDURE — 99214 PR OFFICE/OUTPT VISIT, EST, LEVL IV, 30-39 MIN: ICD-10-PCS | Mod: ,,, | Performed by: FAMILY MEDICINE

## 2022-12-28 PROCEDURE — 3074F PR MOST RECENT SYSTOLIC BLOOD PRESSURE < 130 MM HG: ICD-10-PCS | Mod: CPTII,,, | Performed by: FAMILY MEDICINE

## 2022-12-28 PROCEDURE — 3078F PR MOST RECENT DIASTOLIC BLOOD PRESSURE < 80 MM HG: ICD-10-PCS | Mod: CPTII,,, | Performed by: FAMILY MEDICINE

## 2022-12-28 PROCEDURE — 1160F PR REVIEW ALL MEDS BY PRESCRIBER/CLIN PHARMACIST DOCUMENTED: ICD-10-PCS | Mod: CPTII,,, | Performed by: FAMILY MEDICINE

## 2022-12-28 PROCEDURE — 3008F PR BODY MASS INDEX (BMI) DOCUMENTED: ICD-10-PCS | Mod: CPTII,,, | Performed by: FAMILY MEDICINE

## 2022-12-28 RX ORDER — MELOXICAM 7.5 MG/1
7.5 TABLET ORAL DAILY
COMMUNITY
End: 2023-03-20

## 2022-12-28 RX ORDER — AZITHROMYCIN 250 MG/1
TABLET, FILM COATED ORAL
Qty: 6 TABLET | Refills: 0 | Status: SHIPPED | OUTPATIENT
Start: 2022-12-28 | End: 2023-01-02

## 2022-12-28 RX ORDER — BARICITINIB 2 MG/1
1 TABLET, FILM COATED ORAL
COMMUNITY
Start: 2022-10-03 | End: 2023-05-05

## 2022-12-28 RX ORDER — ALBUTEROL SULFATE 90 UG/1
2 AEROSOL, METERED RESPIRATORY (INHALATION) EVERY 6 HOURS PRN
Qty: 18 G | Refills: 0 | Status: SHIPPED | OUTPATIENT
Start: 2022-12-28

## 2022-12-28 RX ORDER — NALOXONE HYDROCHLORIDE 4 MG/.1ML
SPRAY NASAL
COMMUNITY
Start: 2022-09-23

## 2022-12-28 NOTE — PROGRESS NOTES
"Subjective:        Patient ID: Vanesa Ricks is a 52 y.o. female.    Chief Complaint: Follow-up (Lethargy q 1 week/States she has been coughing up bloody mucous every morning q 1 week. /SOB q 1 week)      Patient presents to the clinic unaccompanied with complaint.  CC from call center is "she has not been feeling that great."      Fatigue, no energy and sob x 1 week. Productive cough yellowish mucus with dark blood tinged.  Worse in am but occurs during day.  On xarelto and effient.  No fever. No ear pain. No nasal ocngestion or rhionorrhea.  No otc meds or suppleemnts.  No asthma history. No sick contacts. Not usually sick at this time of year. No pnd.   No recent travel.     She has a history of lupus vasculitis, PAD/PVD, CHF, HLD.  She has seen Dr. Espinoza for mesenteric stenting in 8/2019.  Had had 4 fem pops (2 on each leg).  Her cardiologist is Dr. Villa. She has had partial left great toe amputation and right great and second toes amputated. She has neuropathy and is also on gabapentin. Asking to see another vascular doctor for second opinion.      She has a history of chronic pain: seeing pain mgmt in [place]- Dr. Stockton.  She is on norco 7.5mg from his office.  May be establishing with new pain mgmt as it is difficult for her to get to [place] and they may ask her to start doing injections instead of just prescribing pain meds.     She is asking for handicap tag.  Sometimes uses cane or walker. Cannot walk more than 200 ft without stopping to rest.               At her visit 6/2022, she c/o her skin peeling off and pain. She states it started last week after she got out of shower she noticed some blisters on her anterior left shin.  They popped clear fluid.  Then her skin started peeling.  It is painful.   She went to the emergency room on Lu 10, 2022 for this issue. Cbc and cmp were wnl.   She was given Keflex and triamcinolone cream.  She states she was told she would be given pain meds but waited " over an hour and left without a prescription.  She follows with pain management and is on norco from their office. She denies fever.   She states she has not noticed any improvement in her symptoms however she was not able to  her steroid cream from the pharmacy yet.  She has history of RA and lupus and lupus vasculitis.  Has not seen rheum since dr. chavez moved.  Is on oral prednisone and plaquenil. Denies trauma or injury.  She states when this has happened in the past, she usually does not respond to oral antibiotics and needs IV antibiotics.  we changed to bactrim and sent in mupirocin ointment. She declined wound care referral.  Also encouraged smoking cessation     She has a history of RA, lupus (dx at age 14), lupus vasculitis.  She was seeing Dr. Chavez, rheumatology.  Her lov was 11/29/21.  She is on plaquenil, benlysta, olumiant, xarelto and prednisone.  She has not had follow up since Dr. Chavez has moved. Dr. Chavez referred her to [facility] and she saw dr. Cabrales on 2/21/22.  He advised to continue lifelong xarelto and follow up in 6 months     She has history of diabetes likely due to long term steroid use. Eye exams with dr. Hampton.  Foot exam 5/2022.      She has gerd and is on protonix     On zoloft for her mood     Has history of hypothyroid and is on synthroid.    Review of Systems   Constitutional:  Positive for fatigue. Negative for fever.   HENT: Negative.  Negative for congestion, nosebleeds, postnasal drip, rhinorrhea, sinus pressure, sinus pain, sneezing and sore throat.    Respiratory:  Positive for cough and shortness of breath. Negative for apnea, choking, chest tightness and wheezing.    Cardiovascular: Negative.  Negative for chest pain and palpitations.   Gastrointestinal: Negative.    Genitourinary: Negative.    Musculoskeletal:  Positive for arthralgias and gait problem.       Review of patient's allergies indicates:   Allergen Reactions    Heparin Anaphylaxis    Vancomycin       "Other reaction(s): Blotchy, Hives      Vitals:    12/28/22 1611   BP: 124/76   BP Location: Left arm   Pulse: 73   Resp: 16   Temp: 98.4 °F (36.9 °C)   TempSrc: Temporal   SpO2: 97%   Weight: 107.6 kg (237 lb 4.8 oz)   Height: 5' 11" (1.803 m)      Social History     Socioeconomic History    Marital status: Single   Tobacco Use    Smoking status: Every Day     Types: Cigarettes    Smokeless tobacco: Never    Tobacco comments:     patient reports she smokes marijuana   Substance and Sexual Activity    Alcohol use: Yes     Comment: socially    Drug use: Never    Sexual activity: Yes      History reviewed. No pertinent family history.       Objective:     Physical Exam  Vitals and nursing note reviewed.   Constitutional:       Appearance: Normal appearance. She is obese.   HENT:      Head: Normocephalic and atraumatic.      Nose: Nose normal.      Mouth/Throat:      Mouth: Mucous membranes are moist.      Pharynx: Oropharynx is clear.   Eyes:      Extraocular Movements: Extraocular movements intact.   Cardiovascular:      Rate and Rhythm: Normal rate and regular rhythm.      Pulses: Normal pulses.      Heart sounds: Normal heart sounds.   Pulmonary:      Effort: Pulmonary effort is normal.      Breath sounds: Examination of the right-upper field reveals wheezing. Examination of the left-upper field reveals wheezing. Examination of the right-lower field reveals wheezing. Examination of the left-lower field reveals wheezing. Wheezing present.   Musculoskeletal:         General: Normal range of motion.      Cervical back: Normal range of motion.   Skin:     General: Skin is warm and dry.   Neurological:      General: No focal deficit present.      Mental Status: She is alert and oriented to person, place, and time. Mental status is at baseline.   Psychiatric:         Mood and Affect: Mood normal.     Current Outpatient Medications on File Prior to Visit   Medication Sig Dispense Refill    meloxicam (MOBIC) 7.5 MG tablet " Take 7.5 mg by mouth once daily.      belimumab (BENLYSTA) 200 mg/mL Syrg Inject 1 mL (200 mg total) into the skin once a week. 4 each 11    cetirizine (ZYRTEC) 10 MG tablet Take 1 tablet (10 mg total) by mouth once daily. 30 tablet 0    gabapentin (NEURONTIN) 300 MG capsule Take 1 capsule (300 mg total) by mouth once daily. 30 capsule 3    HYDROcodone-acetaminophen (NORCO) 7.5-325 mg per tablet Take 1 tablet by mouth every 8 (eight) hours as needed.      hydrOXYchloroQUINE (PLAQUENIL) 200 mg tablet Take 200 mg by mouth 2 (two) times daily.      mupirocin (BACTROBAN) 2 % ointment Apply topically 3 (three) times daily. 30 g 3    naloxone (NARCAN) 4 mg/actuation Spry SMARTSIG:Both Nares      OLUMIANT 2 mg tablet Take 1 tablet by mouth.      prasugreL (EFFIENT) 10 mg Tab TAKE ONE TABLET BY MOUTH ONE TIME DAILY 30 tablet 0    rivaroxaban (XARELTO) 10 mg Tab Take 10 mg by mouth.      sertraline (ZOLOFT) 100 MG tablet Take 1 tablet (100 mg total) by mouth once daily. 30 tablet 11    [DISCONTINUED] fluticasone propionate (FLONASE) 50 mcg/actuation nasal spray 1 spray (50 mcg total) by Each Nostril route 2 (two) times daily as needed for Rhinitis. 16 g 0    [DISCONTINUED] ondansetron (ZOFRAN-ODT) 4 MG TbDL Take 1 tablet (4 mg total) by mouth every 6 (six) hours as needed (nausea). (Patient not taking: Reported on 8/22/2022) 12 tablet 0    [DISCONTINUED] predniSONE (DELTASONE) 1 MG tablet TAKE 5 MG (5 TABLETS) BY MOUTH EVERY MORNING WITH FOOD, THEN TAPER FROM 5 MG PER DAY, BY 1 MG EVERY MONTH.      [DISCONTINUED] triamcinolone acetonide 0.025% (KENALOG) 0.025 % cream Apply topically 2 (two) times daily. Apply small amount to rash, rubbing in well, avoid getting in eyes. 30 g 0     No current facility-administered medications on file prior to visit.     Health Maintenance   Topic Date Due    Mammogram  09/17/2022    TETANUS VACCINE  12/28/2023 (Originally 3/18/1988)    Lipid Panel  10/01/2025    Hepatitis C Screening   Completed      Results for orders placed or performed during the hospital encounter of 10/11/22   COVID/FLU A&B PCR   Result Value Ref Range    Influenza A PCR Not Detected Not Detected    Influenza B PCR Not Detected Not Detected    SARS-CoV-2 PCR Not Detected Not Detected   Strep Group A by PCR   Result Value Ref Range    STREP A PCR ([facility]) Not Detected Not Detected   Comprehensive metabolic panel   Result Value Ref Range    Sodium Level 140 136 - 145 mmol/L    Potassium Level 4.4 3.5 - 5.1 mmol/L    Chloride 107 98 - 107 mmol/L    Carbon Dioxide 20 (L) 22 - 29 mmol/L    Glucose Level 102 (H) 74 - 100 mg/dL    Blood Urea Nitrogen 15.8 9.8 - 20.1 mg/dL    Creatinine 0.82 0.55 - 1.02 mg/dL    Calcium Level Total 9.1 8.4 - 10.2 mg/dL    Protein Total 6.9 6.4 - 8.3 gm/dL    Albumin Level 3.7 3.5 - 5.0 gm/dL    Globulin 3.2 2.4 - 3.5 gm/dL    Albumin/Globulin Ratio 1.2 1.1 - 2.0 ratio    Bilirubin Total 0.5 <=1.5 mg/dL    Alkaline Phosphatase 131 40 - 150 unit/L    Alanine Aminotransferase 34 0 - 55 unit/L    Aspartate Aminotransferase 45 (H) 5 - 34 unit/L    eGFR >60 mls/min/1.73/m2   Urinalysis, Reflex to Urine Culture Urine, Clean Catch    Specimen: Urine, Clean Catch   Result Value Ref Range    Color, UA Light-Yellow Yellow, Light-Yellow, Dark Yellow, Lizz, Straw    Appearance, UA Clear Clear    Specific Gravity, UA 1.008     pH, UA 6.0 5.0, 5.5, 6.0, 6.5, 7.0, 7.5, 8.0, 8.5    Protein, UA 2+ (A) Negative mg/dL    Glucose, UA Normal Negative, Normal mg/dL    Ketones, UA Negative Negative mg/dL    Blood, UA 3+ (A) Negative unit/L    Bilirubin, UA Negative Negative mg/dL    Urobilinogen, UA Normal 0.2, 1.0, Normal mg/dL    Nitrites, UA Negative Negative    Leukocyte Esterase, UA Negative Negative unit/L    WBC, UA 0-5 None Seen, 0-2, 3-5, 0-5 /HPF    Bacteria, UA None Seen None Seen /HPF    Squamous Epithelial Cells, UA Trace (A) None Seen /HPF    Mucous, UA Trace (A) None Seen /LPF    Hyaline Casts, UA None Seen  None Seen /lpf    RBC, UA 6-10 (A) None Seen, 0-2, 3-5, 0-5 /HPF   CBC with Differential   Result Value Ref Range    WBC 13.7 (H) 4.5 - 11.5 x10(3)/mcL    RBC 4.33 4.20 - 5.40 x10(6)/mcL    Hgb 13.6 12.0 - 16.0 gm/dL    Hct 40.4 37.0 - 47.0 %    MCV 93.3 80.0 - 94.0 fL    MCH 31.4 (H) 27.0 - 31.0 pg    MCHC 33.7 33.0 - 36.0 mg/dL    RDW 12.8 11.5 - 17.0 %    Platelet 265 130 - 400 x10(3)/mcL    MPV 9.9 7.4 - 10.4 fL    Neut % 78.8 %    Lymph % 15.1 %    Mono % 3.6 %    Eos % 1.7 %    Basophil % 0.4 %    Lymph # 2.07 0.6 - 4.6 x10(3)/mcL    Neut # 10.8 (H) 2.1 - 9.2 x10(3)/mcL    Mono # 0.50 0.1 - 1.3 x10(3)/mcL    Eos # 0.24 0 - 0.9 x10(3)/mcL    Baso # 0.06 0 - 0.2 x10(3)/mcL    IG# 0.06 (H) 0 - 0.04 x10(3)/mcL    IG% 0.4 %    NRBC% 0.0 %          Assessment & Plan:     Active Problem List with Overview Notes    Diagnosis Date Noted    Bronchitis 12/28/2022    Breast cancer screening by mammogram 12/28/2022    Arterial insufficiency of lower extremity-chronic 08/18/2022    Asplenia 06/14/2022    Cigarette smoker 06/14/2022    Class 1 obesity with body mass index (BMI) of 31.0 to 31.9 in adult 06/14/2022    Skin sore 06/14/2022    Lupus anticoagulant disorder 05/05/2022    Seronegative rheumatoid arthritis 05/05/2022    Neuropathy 05/05/2022     Problem automatically updated based on documentation on Capillary Refills, Brachial Pulses, Radial Pulses, Femoral Pulses, Dorsalis Pulses, Ulnar pulses, Popliteal Pulses, Postibial Pulses, Edemas, Affect/Behavior, Orientation Assessment, Arterial Line Site.      Peripheral vascular disease 05/05/2022     Problem automatically updated based on documentation on Capillary Refills, Brachial Pulses, Radial Pulses, Femoral Pulses, Dorsalis Pulses, Ulnar pulses, Popliteal Pulses, Postibial Pulses, Edemas, Affect/Behavior, Orientation Assessment, Arterial Line Site.      Irritable mood 05/05/2022    Hepatic cirrhosis 05/05/2022    Hyperlipidemia 05/05/2022    Hypothyroidism  05/05/2022     Problem automatically updated based on documentation on Capillary Refills, Brachial Pulses, Radial Pulses, Femoral Pulses, Dorsalis Pulses, Ulnar pulses, Popliteal Pulses, Postibial Pulses, Edemas, Affect/Behavior, Orientation Assessment, Arterial Line Site.      Gastroesophageal reflux disease 05/05/2022    Diabetes mellitus 05/05/2022    Congestive heart failure 05/05/2022    Lupus vasculitis 05/05/2022    Peripheral artery disease 05/05/2022    Impaired mobility 05/05/2022    Chronic pain 05/05/2022       1. Bronchitis  Assessment & Plan:  Will get imaging today. Will call with results when available. Sputum culture ordered.  zpack sent in. Take as directed. proair also sent in. Use as directed. Encourage fluids and rest.  Contact clinic if not improved. Patient is agreeable to plan and verbalized understanding    Orders:  -     X-Ray Chest PA And Lateral; Future; Expected date: 12/28/2022  -     Respiratory Culture; Future; Expected date: 12/28/2022  -     azithromycin (Z-ALVARO) 250 MG tablet; Take 2 tablets by mouth on day 1; Take 1 tablet by mouth on days 2-5  Dispense: 6 tablet; Refill: 0  -     albuterol (PROAIR HFA) 90 mcg/actuation inhaler; Inhale 2 puffs into the lungs every 6 (six) hours as needed for Wheezing or Shortness of Breath. Rescue  Dispense: 18 g; Refill: 0    2. Other chronic pain  Assessment & Plan:  On norco 7.5mg TID per pain management, Dr. Stockton, in . She sees him monthly.       3. Cigarette smoker  Assessment & Plan:  Smoking cessation referral placed    Orders:  -     Ambulatory referral/consult to Smoking Cessation Program; Future; Expected date: 01/04/2023    4. Impaired mobility  Assessment & Plan:  Handicap form completed and given to patient. Copy scanned in chart      5. Peripheral vascular disease  Overview:  Problem automatically updated based on documentation on Capillary Refills, Brachial Pulses, Radial Pulses, Femoral Pulses, Dorsalis Pulses, Ulnar pulses,  Popliteal Pulses, Postibial Pulses, Edemas, Affect/Behavior, Orientation Assessment, Arterial Line Site.    Assessment & Plan:  On xarelto and effient.  Will place referral to another vascular md for second opinion    Orders:  -     Ambulatory referral/consult to Vascular Surgery; Future; Expected date: 01/04/2023    6. Breast cancer screening by mammogram  Assessment & Plan:  mmg ordered    Orders:  -     Mammo Digital Screening Bilat; Future; Expected date: 12/28/2022         Follow up in about 3 months (around 3/28/2023) for Medicare Wellness with labs.

## 2022-12-28 NOTE — ASSESSMENT & PLAN NOTE
Will get imaging today. Will call with results when available. Sputum culture ordered.  zpack sent in. Take as directed. proair also sent in. Use as directed. Encourage fluids and rest.  Contact clinic if not improved. Patient is agreeable to plan and verbalized understanding

## 2022-12-29 ENCOUNTER — HOSPITAL ENCOUNTER (OUTPATIENT)
Dept: RADIOLOGY | Facility: HOSPITAL | Age: 52
Discharge: HOME | End: 2022-12-29
Attending: FAMILY MEDICINE
Payer: MEDICARE

## 2022-12-29 DIAGNOSIS — J40 BRONCHITIS: ICD-10-CM

## 2022-12-29 PROCEDURE — 71046 X-RAY EXAM CHEST 2 VIEWS: CPT | Mod: TC

## 2022-12-29 NOTE — PROGRESS NOTES
Please inform patient of results.    1. Cxr is clear. Continue on treatment plan as discussed in clinic. Contact clinic for concerns    Other labwork within acceptable ranges.

## 2022-12-31 ENCOUNTER — HOSPITAL ENCOUNTER (EMERGENCY)
Facility: HOSPITAL | Age: 52
Discharge: HOME | End: 2022-12-31
Attending: EMERGENCY MEDICINE
Payer: MEDICARE

## 2022-12-31 VITALS
OXYGEN SATURATION: 94 % | SYSTOLIC BLOOD PRESSURE: 120 MMHG | BODY MASS INDEX: 32.08 KG/M2 | RESPIRATION RATE: 18 BRPM | HEART RATE: 60 BPM | TEMPERATURE: 98 F | WEIGHT: 230 LBS | DIASTOLIC BLOOD PRESSURE: 71 MMHG

## 2022-12-31 DIAGNOSIS — J18.9 ATYPICAL PNEUMONIA: ICD-10-CM

## 2022-12-31 DIAGNOSIS — R04.2 COUGHING UP BLOOD: ICD-10-CM

## 2022-12-31 DIAGNOSIS — R04.2 HEMOPTYSIS: Primary | ICD-10-CM

## 2022-12-31 LAB
ALBUMIN SERPL-MCNC: 3.3 G/DL (ref 3.5–5)
ALBUMIN/GLOB SERPL: 0.9 RATIO (ref 1.1–2)
ALP SERPL-CCNC: 126 UNIT/L (ref 40–150)
ALT SERPL-CCNC: 27 UNIT/L (ref 0–55)
AST SERPL-CCNC: 36 UNIT/L (ref 5–34)
BASOPHILS # BLD AUTO: 0.06 X10(3)/MCL (ref 0–0.2)
BASOPHILS NFR BLD AUTO: 0.9 %
BILIRUBIN DIRECT+TOT PNL SERPL-MCNC: 0.5 MG/DL
BUN SERPL-MCNC: 17.8 MG/DL (ref 9.8–20.1)
CALCIUM SERPL-MCNC: 9.1 MG/DL (ref 8.4–10.2)
CHLORIDE SERPL-SCNC: 110 MMOL/L (ref 98–107)
CO2 SERPL-SCNC: 23 MMOL/L (ref 22–29)
CREAT SERPL-MCNC: 0.87 MG/DL (ref 0.55–1.02)
EOSINOPHIL # BLD AUTO: 0.2 X10(3)/MCL (ref 0–0.9)
EOSINOPHIL NFR BLD AUTO: 2.9 %
ERYTHROCYTE [DISTWIDTH] IN BLOOD BY AUTOMATED COUNT: 13.9 % (ref 11–14.5)
FLUAV AG UPPER RESP QL IA.RAPID: NOT DETECTED
FLUBV AG UPPER RESP QL IA.RAPID: NOT DETECTED
GFR SERPLBLD CREATININE-BSD FMLA CKD-EPI: >60 MLS/MIN/1.73/M2
GLOBULIN SER-MCNC: 3.5 GM/DL (ref 2.4–3.5)
GLUCOSE SERPL-MCNC: 144 MG/DL (ref 74–100)
HCT VFR BLD AUTO: 43.5 % (ref 37–47)
HGB BLD-MCNC: 14.1 GM/DL (ref 12–16)
IMM GRANULOCYTES # BLD AUTO: 0.02 X10(3)/MCL (ref 0–0.04)
IMM GRANULOCYTES NFR BLD AUTO: 0.3 %
LYMPHOCYTES # BLD AUTO: 2.37 X10(3)/MCL (ref 0.6–4.6)
LYMPHOCYTES NFR BLD AUTO: 34.2 %
MCH RBC QN AUTO: 30.9 PG
MCHC RBC AUTO-ENTMCNC: 32.4 MG/DL (ref 33–36)
MCV RBC AUTO: 95.2 FL (ref 80–94)
MONOCYTES # BLD AUTO: 0.4 X10(3)/MCL (ref 0.1–1.3)
MONOCYTES NFR BLD AUTO: 5.8 %
NEUTROPHILS # BLD AUTO: 3.88 X10(3)/MCL (ref 2.1–9.2)
NEUTROPHILS NFR BLD AUTO: 55.9 %
NRBC BLD AUTO-RTO: 0 % (ref 0–1)
PLATELET # BLD AUTO: 263 X10(3)/MCL (ref 140–371)
PMV BLD AUTO: 9.6 FL (ref 9.4–12.4)
POTASSIUM SERPL-SCNC: 4.2 MMOL/L (ref 3.5–5.1)
PROT SERPL-MCNC: 6.8 GM/DL (ref 6.4–8.3)
RBC # BLD AUTO: 4.57 X10(6)/MCL (ref 4.2–5.4)
SARS-COV-2 RNA RESP QL NAA+PROBE: NOT DETECTED
SODIUM SERPL-SCNC: 142 MMOL/L (ref 136–145)
WBC # SPEC AUTO: 6.9 X10(3)/MCL (ref 4.5–11.5)

## 2022-12-31 PROCEDURE — 85025 COMPLETE CBC W/AUTO DIFF WBC: CPT | Performed by: NURSE PRACTITIONER

## 2022-12-31 PROCEDURE — 0240U COVID/FLU A&B PCR: CPT | Performed by: NURSE PRACTITIONER

## 2022-12-31 PROCEDURE — 94640 AIRWAY INHALATION TREATMENT: CPT

## 2022-12-31 PROCEDURE — 80053 COMPREHEN METABOLIC PANEL: CPT | Performed by: NURSE PRACTITIONER

## 2022-12-31 PROCEDURE — 99285 EMERGENCY DEPT VISIT HI MDM: CPT | Mod: 25,CS

## 2022-12-31 PROCEDURE — 25000242 PHARM REV CODE 250 ALT 637 W/ HCPCS: Performed by: EMERGENCY MEDICINE

## 2022-12-31 RX ORDER — DOXYCYCLINE 100 MG/1
100 CAPSULE ORAL 2 TIMES DAILY
Qty: 20 CAPSULE | Refills: 0 | Status: SHIPPED | OUTPATIENT
Start: 2022-12-31 | End: 2023-01-04

## 2022-12-31 RX ORDER — ALBUTEROL SULFATE 0.83 MG/ML
2.5 SOLUTION RESPIRATORY (INHALATION)
Status: COMPLETED | OUTPATIENT
Start: 2022-12-31 | End: 2022-12-31

## 2022-12-31 RX ADMIN — ALBUTEROL SULFATE 2.5 MG: 2.5 SOLUTION RESPIRATORY (INHALATION) at 02:12

## 2022-12-31 NOTE — ED PROVIDER NOTES
Encounter Date: 2022    SCRIBE #1 NOTE: I, Bert Harden, am scribing for, and in the presence of,  Juno Garay MD. I have scribed the following portions of the note - Other sections scribed: hpi,ros,pe.     History     Chief Complaint   Patient presents with    Night Sweats    Hemoptysis     Pt presents c/o night sweats with hemoptysis.  Onset x 2 days.  Denies lung/cardiac dx.       53 y/o female presenting to the ED complaining of Hemoptysis onset 2 days ago. Patient states yesterday blood was mixed with mucus but today she's coughing only blood; she describes the color as bright red. She denies fever, abdominal pain, back pain, chest pain, dysuria, nose bleeds, hematuria, or blood in stool. She reports being prescribed an inhaler yesterday; states she used it only once last night. Patient also reports taking XARELTO and denies history of heart problems.     Past medical history includes, peripheral artery disease and Lupus  Surgical history: Artery bypass bilaterally, Splenectomy, cholecystectomy, Stent in artery, and Toe amputations; first and second toe on right foot and partial first toe on left foot.  Immunizations: Pneumonia vaccine but Covid vaccine   Social: Former tobacco smoker, quit 9 years ago, smokes marijuana but denies ETOH use. Patient is disabled, single, lives with significant other, and has no children. She reports tubal pregnancy on the right side.   Family history: Father is ,  from alcohol abuse. Mother is still alive, medical history includes, Multiple sclerosis       The history is provided by the patient. No  was used.   Hemoptysis  This is a new problem. The current episode started 2 days ago. The problem occurs rarely. The problem has not changed since onset.Pertinent negatives include no chest pain and no abdominal pain. Nothing aggravates the symptoms. Nothing relieves the symptoms.   Review of patient's allergies indicates:   Allergen  Reactions    Heparin Anaphylaxis    Vancomycin      Other reaction(s): Blotchy, Hives     Past Medical History:   Diagnosis Date    PAD (peripheral artery disease)     Systemic lupus erythematosus, organ or system involvement unspecified      Past Surgical History:   Procedure Laterality Date    HYSTERECTOMY  07/01/2013     No family history on file.  Social History     Tobacco Use    Smoking status: Every Day     Types: Cigarettes    Smokeless tobacco: Never    Tobacco comments:     patient reports she smokes marijuana   Substance Use Topics    Alcohol use: Yes     Comment: socially    Drug use: Never     Review of Systems   Constitutional:  Negative for fever.   HENT:  Negative for nosebleeds.    Respiratory:  Positive for cough (coughs blood) and hemoptysis.    Cardiovascular:  Negative for chest pain.   Gastrointestinal:  Negative for abdominal pain.   Genitourinary:  Negative for dysuria and hematuria.   Musculoskeletal:  Negative for back pain.     Physical Exam     Initial Vitals [12/31/22 1158]   BP Pulse Resp Temp SpO2   (!) 146/95 88 20 98.1 °F (36.7 °C) (!) 94 %      MAP       --         Physical Exam    Nursing note and vitals reviewed.  Constitutional: She appears well-developed and well-nourished.   Port line stain on right cheek   HENT:   Head: Normocephalic and atraumatic.   Mouth/Throat: Oropharynx is clear and moist.   Eyes: Pupils are equal, round, and reactive to light.   Neck: Neck supple.   Normal range of motion.  Cardiovascular:  Normal rate, regular rhythm, normal heart sounds and intact distal pulses.           Pulmonary/Chest: She has wheezes (Diffused) in the right upper field, the right middle field, the right lower field, the left upper field, the left middle field and the left lower field.   Abdominal: Abdomen is soft. Bowel sounds are normal. There is no abdominal tenderness. There is no rebound.   Musculoskeletal:         General: No tenderness. Normal range of motion.      Cervical  back: Normal range of motion and neck supple.      Comments: Amputations to first and second toes on right foot.  Partial first toe on left foot.   Feet are warm and perfused.     Neurological: She is alert and oriented to person, place, and time. She has normal strength. No sensory deficit. GCS score is 15. GCS eye subscore is 4. GCS verbal subscore is 5. GCS motor subscore is 6.   Skin: Skin is warm and dry.   Hyperpigmentation on legs     Psychiatric: She has a normal mood and affect.       ED Course   Procedures  Labs Reviewed   COMPREHENSIVE METABOLIC PANEL - Abnormal; Notable for the following components:       Result Value    Chloride 110 (*)     Glucose Level 144 (*)     Albumin Level 3.3 (*)     Albumin/Globulin Ratio 0.9 (*)     Aspartate Aminotransferase 36 (*)     All other components within normal limits   CBC WITH DIFFERENTIAL - Abnormal; Notable for the following components:    MCV 95.2 (*)     MCHC 32.4 (*)     All other components within normal limits   COVID/FLU A&B PCR - Normal    Narrative:     The Xpert Xpress SARS-CoV-2/FLU/RSV plus is a rapid, multiplexed real-time PCR test intended for the simultaneous qualitative detection and differentiation of SARS-CoV-2, Influenza A, Influenza B, and respiratory syncytial virus (RSV) viral RNA in either nasopharyngeal swab or nasal swab specimens.         CBC W/ AUTO DIFFERENTIAL    Narrative:     The following orders were created for panel order CBC Auto Differential.  Procedure                               Abnormality         Status                     ---------                               -----------         ------                     CBC with Differential[[id number]]        Abnormal            Final result                 Please view results for these tests on the individual orders.          Imaging Results              CT Chest Without Contrast (Final result)  Result time 12/31/22 17:57:03      Final result by Gorge Leiva MD (12/31/22 17:57:03)                    Impression:      1. Right upper lung lobe minimal scattered ground-glass attenuations reflect minimal nonspecific pneumonitis.    2. Otherwise, no significant acute findings.      Electronically signed by: Gorge Leiva  Date:    12/31/2022  Time:    17:57               Narrative:    EXAMINATION:  CT CHEST WITHOUT CONTRAST    CLINICAL HISTORY:  Hemoptysis;    TECHNIQUE:  Multidetector axial images were performed from thoracic inlet to below hemidiaphragms without intravenous contrast administration. Sagittal and coronal reformations performed.    Dose length product of 269 mGycm. Automated exposure control was utilized to minimize radiation dose.    COMPARISON:  CT chest August 8, 2021    FINDINGS:  There are mild emphysematous changes which involve bilateral upper lung lobes.  Within right upper lung lobe are few minimal scattered ground-glass attenuation which reflect minimal nonspecific pneumonitis.  Otherwise, there is no consolidation or pulmonary edema.  No fluid within the pleural or the pericardial spaces.    Chest lymph nodes are not enlarged in size.   The thoracic aorta is without aneurysmal dilatation.  Coronary arteries calcified plaques.  Cardiac chamber size is within normal limits.  There is partially visualized stent within the common bile duct which was also present on the previous exam August 18, 2022.   Adrenals are not enlarged in size.                                       X-Ray Chest PA And Lateral (Final result)  Result time 12/31/22 12:30:21      Final result by Mohit Harris MD (12/31/22 12:30:21)                   Impression:      No acute cardiopulmonary process.      Electronically signed by: Mohit Harris  Date:    12/31/2022  Time:    12:30               Narrative:    EXAMINATION:  XR CHEST PA AND LATERAL    CLINICAL HISTORY:  Hemoptysis    TECHNIQUE:  Two views of the chest    COMPARISON:  12/29/2022    FINDINGS:  No focal opacification, pleural effusion, or  pneumothorax.    The cardiomediastinal silhouette is within normal limits.    No acute osseous abnormality.                                       Medications   albuterol nebulizer solution 2.5 mg (2.5 mg Nebulization Given 12/31/22 3726)     Medical Decision Making:   Initial Assessment:   Patient came to the emergency department complaining of hemoptysis going on 2 or 3 days she had a complete workup done as an outpatient by her doctor but she never got any results she is very concerned she does take anticoagulation.  For peripheral artery disease.  Xarelto specifically.  The patient denies fever chills nausea vomiting or very hard time breathing she has had some wheezing since this started.  In her doctor prescribed an albuterol inhaler which she did  yesterday.  She had a chest x-ray done yesterday that was normal.  She is concerned she is a previous smoker.  And she still smokes marijuana.  Differential Diagnosis:   Pulmonary embolus, pneumonitis, bronchitis the most common cause of hemoptysis, pneumonia, lung mass.  Clinical Tests:   Lab Tests: Ordered and Reviewed  The following lab test(s) were unremarkable: CBC and CMP       <> Summary of Lab: Normal hemoglobin hematocrit actually on the high side blood work otherwise very unremarkable.  Patient has no resting tachycardia she has no pleuritic nature pain she does not have chest pain denies any any chest pain  I reviewed the chest x-ray no acute cardiopulmonary disease the CT scan shows minor haziness in the right upper lung possible atypical pneumonitis no evidence of other acute abnormalities.  I concur with the radiologist's both the chest x-ray and the CT chest.  Radiological Study: Ordered and Reviewed  ED Management:  Patient got nebulizer treatment here was doing much better after that CT scan took a very long time and I apologized to the patient for the delay.  Just backed up and scan that did returned negative.  for except for atypical  pneumonitis  Patient is ready for discharge I did talk to her about doxycycline for atypical pneumonitis.  I again apologized to her for the long wait today and she is released to follow up with Dr. Brianna Riddle her regular doctor.        Scribe Attestation:   Scribe #1: I performed the above scribed service and the documentation accurately describes the services I performed. I attest to the accuracy of the note.    Attending Attestation:           Physician Attestation for Scribe:  Physician Attestation Statement for Scribe #1: I, reviewed documentation, as scribed by Bert Harden in my presence, and it is both accurate and complete.           ED Course as of 12/31/22 1836   Sat Dec 31, 2022   1814 Because of the minimum ground-glass signs on the x-ray I will treat her for an atypical pneumonitis with doxycycline and patient will be discharged home I explained this to the patient.  I did explain to the patient that it distal prolonged ED stay due to CT being backed up in the entire hospital being this busy and I apologized to her [DM]   1817 Patient is better wheezing wise since the nebulizer treatment she is encouraged to use the inhaler [DM]      ED Course User Index  [DM] Juno Garay MD                 Clinical Impression:   Final diagnoses:  [R04.2] Coughing up blood  [R04.2] Hemoptysis (Primary)  [J18.9] Atypical pneumonia        ED Disposition Condition    Discharge Stable          ED Prescriptions       Medication Sig Dispense Start Date End Date Auth. Provider    doxycycline (VIBRAMYCIN) 100 MG Cap Take 1 capsule (100 mg total) by mouth 2 (two) times daily. for 10 days 20 capsule 12/31/2022 1/10/2023 Juno Garay MD          Follow-up Information       Follow up With Specialties Details Why Contact Info    Brianna Riddle MD Family Medicine In 1 week As needed [address] [facility]  [address]  [place] [address]  [phone]               Juno Garay MD  12/31/22 1817       Juno  ANDREW Garay MD  12/31/22 [id number]

## 2022-12-31 NOTE — FIRST PROVIDER EVALUATION
Medical screening examination initiated.  I have conducted a focused provider triage encounter, findings are as follows:    Brief history of present illness:  51y/o F presents to the ED with c/o coughing up blood clots with night sweats at home. Onset 2 days    There were no vitals filed for this visit.    Pertinent physical exam:  AAA x 3    Brief workup plan:  Labs/imaging     Preliminary workup initiated; this workup will be continued and followed by the physician or advanced practice provider that is assigned to the patient when roomed.

## 2023-01-01 NOTE — DISCHARGE INSTRUCTIONS
Drink extra fluids return for any emergency problem     continue your previous medications     finish the week course of doxycycline    Use the inhaler

## 2023-01-04 RX ORDER — SULFAMETHOXAZOLE AND TRIMETHOPRIM 800; 160 MG/1; MG/1
1 TABLET ORAL 2 TIMES DAILY
Qty: 14 TABLET | Refills: 0 | Status: SHIPPED | OUTPATIENT
Start: 2023-01-04 | End: 2023-01-11

## 2023-01-18 ENCOUNTER — TELEPHONE (OUTPATIENT)
Dept: FAMILY MEDICINE | Facility: CLINIC | Age: 53
End: 2023-01-18
Payer: MEDICARE

## 2023-01-18 NOTE — TELEPHONE ENCOUNTER
----- Message from Erica Meadows sent at 1/18/2023  3:22 PM CST -----  Regarding: med info  Type:  Needs Medical Advice    Who Called: [facility]  Additional Information: patient has cancelled her appt with [facility]

## 2023-03-08 LAB
LEFT EYE DM RETINOPATHY: NEGATIVE
RIGHT EYE DM RETINOPATHY: NEGATIVE

## 2023-03-14 RX ORDER — BARICITINIB 2 MG/1
1 TABLET, FILM COATED ORAL DAILY
Qty: 30 TABLET | Refills: 6 | OUTPATIENT
Start: 2023-03-14

## 2023-03-14 NOTE — TELEPHONE ENCOUNTER
We can send in notes that we have but since we do not prescribe that med and will not be prescribing in the future it is best if her new rheumatologist completes the PA.  We can place a referral if needed.

## 2023-03-14 NOTE — TELEPHONE ENCOUNTER
So I actually spoke with the patient's pharmacy about this medication. They were requesting us send them supporting documents such as office visit notes to complete a PA for this medication. I did explain that this is not a medication that we prescribed for the patient and that they would need to reach out to the physician that did prescribe it. The pharmacist explained that they did contact the physician who prescribed it but the patient had not been seen there since 2021, and they could not use their office visit notes since they were too old. They told me the patient was switching doctors but needed the refill in the meantime. Patient is not requesting that we take over the script to be able to send the refills in and complete the PA with supporting documents. Please advise.

## 2023-03-14 NOTE — TELEPHONE ENCOUNTER
----- Message from Dayna Calabrese sent at 3/14/2023  1:40 PM CDT -----  Regarding: Refill  Type:  RX Refill Request    Who Called: [facility]  Refill or New Rx:refill  RX Name and Strength:baricitinib (OLUMIANT) 2 mg tablet  How is the patient currently taking it? (ex. 1XDay):  Is this a 30 day or 90 day RX:  Preferred Pharmacy with phone number:[facility]   Local or Mail Order:  Ordering Provider:  Would the patient rather a call back or a response via [facility]?   Best Call Back Number:[phone]  Additional Information: Patient requested a refill of medication baricitinib (OLUMIANT) 2 mg tablet..Did state that she does not think Dr Riddle prescribed this but requested it send the message.  She is also requesting a call back.

## 2023-03-20 ENCOUNTER — OFFICE VISIT (OUTPATIENT)
Dept: FAMILY MEDICINE | Facility: CLINIC | Age: 53
End: 2023-03-20
Payer: MEDICARE

## 2023-03-20 VITALS
BODY MASS INDEX: 33.74 KG/M2 | TEMPERATURE: 98 F | HEIGHT: 71 IN | RESPIRATION RATE: 16 BRPM | HEART RATE: 63 BPM | WEIGHT: 241 LBS | OXYGEN SATURATION: 97 % | DIASTOLIC BLOOD PRESSURE: 78 MMHG | SYSTOLIC BLOOD PRESSURE: 118 MMHG

## 2023-03-20 DIAGNOSIS — I73.9 PERIPHERAL ARTERY DISEASE: ICD-10-CM

## 2023-03-20 DIAGNOSIS — I50.9 CONGESTIVE HEART FAILURE, UNSPECIFIED HF CHRONICITY, UNSPECIFIED HEART FAILURE TYPE: ICD-10-CM

## 2023-03-20 DIAGNOSIS — F17.210 CIGARETTE SMOKER: ICD-10-CM

## 2023-03-20 DIAGNOSIS — G62.9 NEUROPATHY: ICD-10-CM

## 2023-03-20 DIAGNOSIS — Z74.09 IMPAIRED MOBILITY: ICD-10-CM

## 2023-03-20 DIAGNOSIS — Z12.31 BREAST CANCER SCREENING BY MAMMOGRAM: ICD-10-CM

## 2023-03-20 DIAGNOSIS — E11.29 TYPE 2 DIABETES MELLITUS WITH OTHER DIABETIC KIDNEY COMPLICATION, WITHOUT LONG-TERM CURRENT USE OF INSULIN: ICD-10-CM

## 2023-03-20 DIAGNOSIS — Z71.89 ADVANCED CARE PLANNING/COUNSELING DISCUSSION: ICD-10-CM

## 2023-03-20 DIAGNOSIS — E11.69 HYPERLIPIDEMIA ASSOCIATED WITH TYPE 2 DIABETES MELLITUS: ICD-10-CM

## 2023-03-20 DIAGNOSIS — I73.9 PERIPHERAL VASCULAR DISEASE: ICD-10-CM

## 2023-03-20 DIAGNOSIS — M06.00 SERONEGATIVE RHEUMATOID ARTHRITIS: ICD-10-CM

## 2023-03-20 DIAGNOSIS — M32.19 LUPUS VASCULITIS: ICD-10-CM

## 2023-03-20 DIAGNOSIS — Z78.0 POSTMENOPAUSAL: ICD-10-CM

## 2023-03-20 DIAGNOSIS — Z00.00 MEDICARE ANNUAL WELLNESS VISIT, SUBSEQUENT: Primary | ICD-10-CM

## 2023-03-20 DIAGNOSIS — E66.9 CLASS 1 OBESITY WITH SERIOUS COMORBIDITY AND BODY MASS INDEX (BMI) OF 33.0 TO 33.9 IN ADULT, UNSPECIFIED OBESITY TYPE: ICD-10-CM

## 2023-03-20 DIAGNOSIS — E03.9 HYPOTHYROIDISM, UNSPECIFIED TYPE: ICD-10-CM

## 2023-03-20 DIAGNOSIS — M32.10 SYSTEMIC LUPUS ERYTHEMATOSUS, ORGAN OR SYSTEM INVOLVEMENT UNSPECIFIED: ICD-10-CM

## 2023-03-20 DIAGNOSIS — G89.29 OTHER CHRONIC PAIN: ICD-10-CM

## 2023-03-20 DIAGNOSIS — D68.62 LUPUS ANTICOAGULANT DISORDER: ICD-10-CM

## 2023-03-20 DIAGNOSIS — I77.89 LUPUS VASCULITIS: ICD-10-CM

## 2023-03-20 DIAGNOSIS — E78.5 HYPERLIPIDEMIA ASSOCIATED WITH TYPE 2 DIABETES MELLITUS: ICD-10-CM

## 2023-03-20 DIAGNOSIS — R45.4 IRRITABLE MOOD: ICD-10-CM

## 2023-03-20 PROCEDURE — G0439 PPPS, SUBSEQ VISIT: HCPCS | Mod: ,,, | Performed by: FAMILY MEDICINE

## 2023-03-20 PROCEDURE — 1160F PR REVIEW ALL MEDS BY PRESCRIBER/CLIN PHARMACIST DOCUMENTED: ICD-10-PCS | Mod: CPTII,,, | Performed by: FAMILY MEDICINE

## 2023-03-20 PROCEDURE — 3078F PR MOST RECENT DIASTOLIC BLOOD PRESSURE < 80 MM HG: ICD-10-PCS | Mod: CPTII,,, | Performed by: FAMILY MEDICINE

## 2023-03-20 PROCEDURE — 3074F PR MOST RECENT SYSTOLIC BLOOD PRESSURE < 130 MM HG: ICD-10-PCS | Mod: CPTII,,, | Performed by: FAMILY MEDICINE

## 2023-03-20 PROCEDURE — 1160F RVW MEDS BY RX/DR IN RCRD: CPT | Mod: CPTII,,, | Performed by: FAMILY MEDICINE

## 2023-03-20 PROCEDURE — 3074F SYST BP LT 130 MM HG: CPT | Mod: CPTII,,, | Performed by: FAMILY MEDICINE

## 2023-03-20 PROCEDURE — 3008F BODY MASS INDEX DOCD: CPT | Mod: CPTII,,, | Performed by: FAMILY MEDICINE

## 2023-03-20 PROCEDURE — G0439 PR MEDICARE ANNUAL WELLNESS SUBSEQUENT VISIT: ICD-10-PCS | Mod: ,,, | Performed by: FAMILY MEDICINE

## 2023-03-20 PROCEDURE — 3008F PR BODY MASS INDEX (BMI) DOCUMENTED: ICD-10-PCS | Mod: CPTII,,, | Performed by: FAMILY MEDICINE

## 2023-03-20 PROCEDURE — 1159F MED LIST DOCD IN RCRD: CPT | Mod: CPTII,,, | Performed by: FAMILY MEDICINE

## 2023-03-20 PROCEDURE — 3078F DIAST BP <80 MM HG: CPT | Mod: CPTII,,, | Performed by: FAMILY MEDICINE

## 2023-03-20 PROCEDURE — 1159F PR MEDICATION LIST DOCUMENTED IN MEDICAL RECORD: ICD-10-PCS | Mod: CPTII,,, | Performed by: FAMILY MEDICINE

## 2023-03-20 RX ORDER — MELOXICAM 15 MG/1
15 TABLET ORAL DAILY PRN
COMMUNITY
Start: 2023-03-09 | End: 2023-05-05

## 2023-03-20 RX ORDER — PRASUGREL 10 MG/1
10 TABLET, FILM COATED ORAL DAILY
Qty: 90 TABLET | Refills: 3 | Status: SHIPPED | OUTPATIENT
Start: 2023-03-20 | End: 2023-04-24 | Stop reason: SDUPTHER

## 2023-03-20 NOTE — ASSESSMENT & PLAN NOTE
Previously seeing dr. chavez at [facility]. Keep appts with dr. Jacobsen. Reports compliance with prescription meds

## 2023-03-20 NOTE — ASSESSMENT & PLAN NOTE
Lab Results   Component Value Date    HGBA1C 5.6 08/18/2022     Urine micro ordered  Foot exam today  Eye exam with dr. Hampton. Will request.     Diet controlled

## 2023-03-20 NOTE — PROGRESS NOTES
Patient ID: [id number]     Chief Complaint: Medicare AWV (Wellness )      HPI:     Vanesa Ricks is a 53 y.o. female here today for a Medicare Wellness. No other complaints today.     Patient presents to the clinic unaccompanied for her wellness visit.  She is due for lab.     She has lupus vasculitis, PAD/PVD, CHF, HLD.  She has seen Dr. Espinoza for mesenteric stenting in 8/2019.  Had had 4 fem pops (2 on each leg).  Her cardiologist is Dr. Villa. She had partial left great toe amputation and right great and second toes amputated. She has neuropathy and is also on gabapentin. Asking to see another vascular doctor for second opinion.      She has chronic pain: seeing pain mgmt in [place]- Dr. Stockton.  She is on norco 7.5mg from his office.  May be establishing with new pain mgmt as it is difficult for her to get to [place] and they may ask her to start doing injections instead of just prescribing pain meds.      She is mobility impaired.  has handicap tag.  Sometimes uses cane or walker. Cannot walk more than 200 ft without stopping to rest.      She has a history of RA, lupus (dx at age 14), lupus vasculitis.  She was seeing Dr. Smith, rheumatology.  Her lov was 11/29/21.  She is on plaquenil, benlysta, olumiant, xarelto and prednisone.  She has not had follow up since Dr. Smith has moved. She is now seeing dr. Jacobsen. Dr. Smith referred her to [facility] and she saw dr. Cabrales on 2/21/22.  He advised to continue lifelong xarelto and follow up in 6 months     She has diabetes likely due to long term steroid use. Is diet controlled. Eye exams with dr. Hampton.  will request. urine micro ordered. Foot exam 5/2022. A1c 5.6 8/2022     She has gerd and was on protonix.  Not taking.      On zoloft for her mood    She is obese.      Has hypothyroid and is on synthroid.  Has not been taking for a few months.  Was giving headaches.     Mmg 9/2021. Ordered. She is postmenopausal.  Never done dexa.  Will order.  She has had  hysterectomy and no longer does pap. Colonoscopy with dr. Lundy 9/2020    She is a smoker. 1 pack last 2.5 weeks. Smoking cessation referral placed.  She is allergic to heparin and vancomycin.        Past Surgical History:   Procedure Laterality Date    HYSTERECTOMY  07/01/2013       Review of patient's allergies indicates:   Allergen Reactions    Heparin Anaphylaxis    Vancomycin      Other reaction(s): Blotchy, Hives       Outpatient Medications Marked as Taking for the 3/20/23 encounter (Office Visit) with Brianna Riddle MD   Medication Sig Dispense Refill    albuterol (PROAIR HFA) 90 mcg/actuation inhaler Inhale 2 puffs into the lungs every 6 (six) hours as needed for Wheezing or Shortness of Breath. Rescue 18 g 0    belimumab (BENLYSTA) 200 mg/mL Syrg Inject 1 mL (200 mg total) into the skin once a week. 4 each 11    gabapentin (NEURONTIN) 300 MG capsule Take 1 capsule (300 mg total) by mouth once daily. 30 capsule 3    HYDROcodone-acetaminophen (NORCO) 7.5-325 mg per tablet Take 1 tablet by mouth every 8 (eight) hours as needed.      hydrOXYchloroQUINE (PLAQUENIL) 200 mg tablet Take 200 mg by mouth 2 (two) times daily.      meloxicam (MOBIC) 15 MG tablet Take 15 mg by mouth daily as needed.      mupirocin (BACTROBAN) 2 % ointment Apply topically 3 (three) times daily. 30 g 3    naloxone (NARCAN) 4 mg/actuation Spry SMARTSIG:Both Nares      OLUMIANT 2 mg tablet Take 1 tablet by mouth.      sertraline (ZOLOFT) 100 MG tablet Take 1 tablet (100 mg total) by mouth once daily. 30 tablet 11    [DISCONTINUED] prasugreL (EFFIENT) 10 mg Tab TAKE ONE TABLET BY MOUTH ONE TIME DAILY 30 tablet 0    [DISCONTINUED] rivaroxaban (XARELTO) 10 mg Tab Take 10 mg by mouth.         Social History     Socioeconomic History    Marital status: Single   Tobacco Use    Smoking status: Every Day     Types: Cigarettes    Smokeless tobacco: Never    Tobacco comments:     patient reports she smokes marijuana   Substance and Sexual  Activity    Alcohol use: Yes     Comment: socially    Drug use: Never    Sexual activity: Yes        History reviewed. No pertinent family history.     Patient Care Team:  Brianna Riddle MD as PCP - General (Family Medicine)  BRENT Lundy MD as Consulting Physician (Gastroenterology)  Tr Hampton MD as Consulting Physician (Ophthalmology)  Siddharth Villa MD as Consulting Physician (Cardiology)  Francisco Espinoza MD as Consulting Physician (Cardiothoracic Surgery)  Cy Stockton MD as Consulting Physician (Pain Medicine)       Subjective:     Review of Systems   Constitutional: Negative.    HENT: Negative.     Eyes: Negative.    Respiratory: Negative.     Cardiovascular: Negative.    Gastrointestinal: Negative.    Genitourinary: Negative.    Musculoskeletal: Negative.    Skin: Negative.    Neurological: Negative.    Endo/Heme/Allergies: Negative.    Psychiatric/Behavioral: Negative.         Patient Reported Health Risk Assessment  What is your age?:  (53)  Are you male or female?: Female  During the past four weeks, how much have you been bothered by emotional problems such as feeling anxious, depressed, irritable, sad, or downhearted and blue?: Slightly  During the past five weeks, has your physical and/or emotional health limited your social activities with family, friends, neighbors, or groups?: Moderately  During the past four weeks, how much bodily pain have you generally had?: Severe pain  During the past four weeks, was someone available to help if you needed and wanted help?: Yes, as much as I wanted  During the past four weeks, what was the hardest physical activity you could do for at least two minutes?: Light  Can you get to places out of walking distance without help?  (For example, can you travel alone on buses or taxis, or drive your own car?): Yes  Can you go shopping for groceries or clothes without someone's help?: Yes  Can you prepare your own meals?: Yes  Can you do your own  housework without help?: Yes  Because of any health problems, do you need the help of another person with your personal care needs such as eating, bathing, dressing, or getting around the house?: No  Can you handle your own money without help?: Yes  During the past four weeks, how would you rate your health in general?: Poor  How have things been going for you during the past four weeks?: Good and bad parts about equal  Are you having difficulties driving your car?: No  Do you always fasten your seat belt when you are in a car?: Yes, usually  How often in the past four weeks have you been bothered by falling or dizzy when standing up?: Sometimes  How often in the past four weeks have you been bothered by sexual problems?: Never  How often in the past four weeks have you been bothered by trouble eating well?: Never  How often in the past four weeks have you been bothered by teeth or denture problems?: Never  How often in the past four weeks have you been bothered with problems using the telephone?: Never  How often in the past four weeks have you been bothered by tiredness or fatigue?: Never  Have you fallen two or more times in the past year?: No  Are you afraid of falling?: No  Are you a smoker?: Yes, I'm not ready to quit  During the past four weeks, how many drinks of wine, beer, or other alcoholic beverages did you have?: One drink or less per week  Do you exercise for about 20 minutes three or more days a week?: No, I usually do not exercise this much  Have you been given any information to help you with hazards in your house that might hurt you?: No  Have you been given any information to help you with keeping track of your medications?: No  How often do you have trouble taking medicines the way you've been told to take them?: I always take them as prescribed  How confident are you that you can control and manage most of your health problems?: Very confident  What is your race? (Check all that apply.):  "    Objective:     /78 (BP Location: Left arm)   Pulse 63   Temp 98.4 °F (36.9 °C) (Temporal)   Resp 16   Ht 5' 11" (1.803 m)   Wt 109.3 kg (241 lb)   LMP  (LMP Unknown)   SpO2 97%   BMI 33.61 kg/m²     Physical Exam  Vitals reviewed.   Constitutional:       Appearance: Normal appearance. She is obese.   HENT:      Head: Normocephalic and atraumatic.      Nose: Nose normal.      Mouth/Throat:      Mouth: Mucous membranes are moist.      Pharynx: Oropharynx is clear.   Eyes:      Extraocular Movements: Extraocular movements intact.   Cardiovascular:      Rate and Rhythm: Normal rate and regular rhythm.      Pulses:           Dorsalis pedis pulses are detected w/ Doppler on the right side and detected w/ Doppler on the left side.        Posterior tibial pulses are detected w/ Doppler on the right side and detected w/ Doppler on the left side.      Heart sounds: Normal heart sounds.   Pulmonary:      Effort: Pulmonary effort is normal.      Breath sounds: Normal breath sounds.   Musculoskeletal:         General: Normal range of motion.      Cervical back: Normal range of motion.        Feet:    Feet:      Right foot:      Protective Sensation: 6 sites tested.  4 sites sensed.      Skin integrity: Skin integrity normal.      Left foot:      Protective Sensation: 8 sites tested.  7 sites sensed.      Skin integrity: Skin integrity normal.      Comments: Red= amputation  X= decreased sensation  Skin:     General: Skin is warm and dry.   Neurological:      General: No focal deficit present.      Mental Status: She is alert and oriented to person, place, and time. Mental status is at baseline.   Psychiatric:         Mood and Affect: Mood normal.         No flowsheet data found.  Fall Risk Assessment - Outpatient 3/20/2023 12/28/2022 6/14/2022 5/5/2022   Mobility Status Ambulatory Ambulatory Ambulatory Ambulatory   Number of falls 0 0 2+ 2+   Identified as fall risk 0 0 1 1           Depression " Screening  Over the past two weeks, has the patient felt down, depressed, or hopeless?: No  Over the past two weeks, has the patient felt little interest or pleasure in doing things?: No  Functional Ability/Safety Screening  Was the patient's timed Up & Go test unsteady or longer than 30 seconds?: No  Does the patient need help with phone, transportation, shopping, preparing meals, housework, laundry, meds, or managing money?: No  Does the patient's home have rugs in the hallway, lack grab bars in the bathroom, lack handrails on the stairs or have poor lighting?: No  Have you noticed any hearing difficulties?: No  Cognitive Function (Assessed through direct observation with due consideration of information obtained by way of patient reports and/or concerns raised by family, friends, caretakers, or others)    Does the patient repeat questions/statements in the same day?: No  Does the patient have trouble remembering the date, year, and time?: No  Does the patient have difficulty managing finances?: No  Does the patient have a decreased sense of direction?: No  Assessment/Plan:       Medicare Annual Wellness and Personalized Prevention Plan:   Fall Risk + Home Safety + Hearing Impairment + Depression Screen + Cognitive Impairment Screen + Health Risk Assessment all reviewed.     Opioid Screening: Patient medication list reviewed, patient is taking prescription opioids. Patient is not using additional opioids than prescribed. Patient is at low risk of substance abuse based on this opioid use history.         Health Maintenance Topics with due status: Not Due       Topic Last Completion Date    Colorectal Cancer Screening 09/20/2020    Pneumococcal Vaccines (Age 0-64) 11/29/2021    Foot Exam 03/20/2023      The patient's Health Maintenance was reviewed and the following appears to be due at this time:   Health Maintenance Due   Topic Date Due    COVID-19 Vaccine (1) Never done    Eye Exam  Never done    HIV Screening   Never done    Diabetes Urine Screening  10/01/2021    Lipid Panel  10/01/2021    Mammogram  09/17/2022    Hemoglobin A1c  02/18/2023         1. Medicare annual wellness visit, subsequent  Assessment & Plan:  Fasting labs ordered. Will call with results when available.  Mammogram   ordered  DEXA  ordered  Colonoscopy with Dr. whiting 9/2020      Advanced care planning discussed and paperwork given      Orders:  -     CBC Auto Differential; Future; Expected date: 03/20/2023  -     Comprehensive Metabolic Panel; Future; Expected date: 03/20/2023  -     Lipid Panel; Future; Expected date: 03/20/2023  -     TSH; Future; Expected date: 03/20/2023  -     Hemoglobin A1C; Future; Expected date: 03/20/2023  -     Urinalysis; Future; Expected date: 03/20/2023  -     HIV 1/2 Ag/Ab (4th Gen); Future; Expected date: 03/20/2023  -     Microalbumin/Creatinine Ratio, Urine; Future; Expected date: 03/20/2023    2. Advanced care planning/counseling discussion  Assessment & Plan:  Advanced care planning discussed and paperwork given.    I attest that I have had a face to face discussion with patient and or surrogate decision maker.   Included surrogate decision maker: NO  Advanced directive in chart: NO  LAPOST: NO    Total time spent: 16 minutes      Orders:  -     CBC Auto Differential; Future; Expected date: 03/20/2023  -     Comprehensive Metabolic Panel; Future; Expected date: 03/20/2023  -     Lipid Panel; Future; Expected date: 03/20/2023  -     TSH; Future; Expected date: 03/20/2023  -     Hemoglobin A1C; Future; Expected date: 03/20/2023  -     Urinalysis; Future; Expected date: 03/20/2023  -     HIV 1/2 Ag/Ab (4th Gen); Future; Expected date: 03/20/2023  -     Microalbumin/Creatinine Ratio, Urine; Future; Expected date: 03/20/2023    3. Type 2 diabetes mellitus with other diabetic kidney complication, without long-term current use of insulin  Assessment & Plan:  Lab Results   Component Value Date    HGBA1C 5.6 08/18/2022      Urine micro ordered  Foot exam today  Eye exam with dr. Hampton. Will request.     Diet controlled     Orders:  -     CBC Auto Differential; Future; Expected date: 03/20/2023  -     Comprehensive Metabolic Panel; Future; Expected date: 03/20/2023  -     Lipid Panel; Future; Expected date: 03/20/2023  -     TSH; Future; Expected date: 03/20/2023  -     Hemoglobin A1C; Future; Expected date: 03/20/2023  -     Urinalysis; Future; Expected date: 03/20/2023  -     HIV 1/2 Ag/Ab (4th Gen); Future; Expected date: 03/20/2023  -     Microalbumin/Creatinine Ratio, Urine; Future; Expected date: 03/20/2023    4. Hyperlipidemia associated with type 2 diabetes mellitus  Assessment & Plan:  Was on lipitor. Keep appts with cardiology- dr. mckeon    Orders:  -     CBC Auto Differential; Future; Expected date: 03/20/2023  -     Comprehensive Metabolic Panel; Future; Expected date: 03/20/2023  -     Lipid Panel; Future; Expected date: 03/20/2023  -     TSH; Future; Expected date: 03/20/2023  -     Hemoglobin A1C; Future; Expected date: 03/20/2023  -     Urinalysis; Future; Expected date: 03/20/2023  -     HIV 1/2 Ag/Ab (4th Gen); Future; Expected date: 03/20/2023  -     Microalbumin/Creatinine Ratio, Urine; Future; Expected date: 03/20/2023    5. Peripheral artery disease  Assessment & Plan:  Keep appointments with cards- dr. Mckeon. On blood thinners. Encouraged smoking cessation    Orders:  -     CBC Auto Differential; Future; Expected date: 03/20/2023  -     Comprehensive Metabolic Panel; Future; Expected date: 03/20/2023  -     Lipid Panel; Future; Expected date: 03/20/2023  -     TSH; Future; Expected date: 03/20/2023  -     Hemoglobin A1C; Future; Expected date: 03/20/2023  -     Urinalysis; Future; Expected date: 03/20/2023  -     HIV 1/2 Ag/Ab (4th Gen); Future; Expected date: 03/20/2023  -     Microalbumin/Creatinine Ratio, Urine; Future; Expected date: 03/20/2023    6. Peripheral vascular disease  Assessment & Plan:  Keep  appointments with cards- dr. Villa. On blood thinners. Encouraged smoking cessation    Orders:  -     CBC Auto Differential; Future; Expected date: 03/20/2023  -     Comprehensive Metabolic Panel; Future; Expected date: 03/20/2023  -     Lipid Panel; Future; Expected date: 03/20/2023  -     TSH; Future; Expected date: 03/20/2023  -     Hemoglobin A1C; Future; Expected date: 03/20/2023  -     Urinalysis; Future; Expected date: 03/20/2023  -     HIV 1/2 Ag/Ab (4th Gen); Future; Expected date: 03/20/2023  -     Microalbumin/Creatinine Ratio, Urine; Future; Expected date: 03/20/2023    7. Congestive heart failure, unspecified HF chronicity, unspecified heart failure type  Assessment & Plan:  Keep appts with cards    Orders:  -     CBC Auto Differential; Future; Expected date: 03/20/2023  -     Comprehensive Metabolic Panel; Future; Expected date: 03/20/2023  -     Lipid Panel; Future; Expected date: 03/20/2023  -     TSH; Future; Expected date: 03/20/2023  -     Hemoglobin A1C; Future; Expected date: 03/20/2023  -     Urinalysis; Future; Expected date: 03/20/2023  -     HIV 1/2 Ag/Ab (4th Gen); Future; Expected date: 03/20/2023  -     Microalbumin/Creatinine Ratio, Urine; Future; Expected date: 03/20/2023    8. Systemic lupus erythematosus, organ or system involvement unspecified  Assessment & Plan:  Keep appts with rheum    Orders:  -     CBC Auto Differential; Future; Expected date: 03/20/2023  -     Comprehensive Metabolic Panel; Future; Expected date: 03/20/2023  -     Lipid Panel; Future; Expected date: 03/20/2023  -     TSH; Future; Expected date: 03/20/2023  -     Hemoglobin A1C; Future; Expected date: 03/20/2023  -     Urinalysis; Future; Expected date: 03/20/2023  -     HIV 1/2 Ag/Ab (4th Gen); Future; Expected date: 03/20/2023  -     Microalbumin/Creatinine Ratio, Urine; Future; Expected date: 03/20/2023    9. Lupus vasculitis  Assessment & Plan:  Previously seeing dr. chavez at [facility]. Keep appts with   Ann-Marie. Reports compliance with prescription meds    Orders:  -     CBC Auto Differential; Future; Expected date: 03/20/2023  -     Comprehensive Metabolic Panel; Future; Expected date: 03/20/2023  -     Lipid Panel; Future; Expected date: 03/20/2023  -     TSH; Future; Expected date: 03/20/2023  -     Hemoglobin A1C; Future; Expected date: 03/20/2023  -     Urinalysis; Future; Expected date: 03/20/2023  -     HIV 1/2 Ag/Ab (4th Gen); Future; Expected date: 03/20/2023  -     Microalbumin/Creatinine Ratio, Urine; Future; Expected date: 03/20/2023    10. Lupus anticoagulant disorder  Assessment & Plan:  Keep appts with rheum    Orders:  -     CBC Auto Differential; Future; Expected date: 03/20/2023  -     Comprehensive Metabolic Panel; Future; Expected date: 03/20/2023  -     Lipid Panel; Future; Expected date: 03/20/2023  -     TSH; Future; Expected date: 03/20/2023  -     Hemoglobin A1C; Future; Expected date: 03/20/2023  -     Urinalysis; Future; Expected date: 03/20/2023  -     HIV 1/2 Ag/Ab (4th Gen); Future; Expected date: 03/20/2023  -     Microalbumin/Creatinine Ratio, Urine; Future; Expected date: 03/20/2023    11. Seronegative rheumatoid arthritis  Assessment & Plan:  Keep appts with dr. Jacobsen. Reports compliance with prescription meds    Orders:  -     CBC Auto Differential; Future; Expected date: 03/20/2023  -     Comprehensive Metabolic Panel; Future; Expected date: 03/20/2023  -     Lipid Panel; Future; Expected date: 03/20/2023  -     TSH; Future; Expected date: 03/20/2023  -     Hemoglobin A1C; Future; Expected date: 03/20/2023  -     Urinalysis; Future; Expected date: 03/20/2023  -     HIV 1/2 Ag/Ab (4th Gen); Future; Expected date: 03/20/2023  -     Microalbumin/Creatinine Ratio, Urine; Future; Expected date: 03/20/2023    12. Breast cancer screening by mammogram  Assessment & Plan:  mmg ordered    Orders:  -     CBC Auto Differential; Future; Expected date: 03/20/2023  -     Comprehensive Metabolic  Panel; Future; Expected date: 03/20/2023  -     Lipid Panel; Future; Expected date: 03/20/2023  -     TSH; Future; Expected date: 03/20/2023  -     Hemoglobin A1C; Future; Expected date: 03/20/2023  -     Urinalysis; Future; Expected date: 03/20/2023  -     HIV 1/2 Ag/Ab (4th Gen); Future; Expected date: 03/20/2023  -     Microalbumin/Creatinine Ratio, Urine; Future; Expected date: 03/20/2023  -     Mammo Digital Screening Bilat w/ Bartolo; Future; Expected date: 03/20/2023    13. Irritable mood  Assessment & Plan:  Well controlled on zoloft    Orders:  -     CBC Auto Differential; Future; Expected date: 03/20/2023  -     Comprehensive Metabolic Panel; Future; Expected date: 03/20/2023  -     Lipid Panel; Future; Expected date: 03/20/2023  -     TSH; Future; Expected date: 03/20/2023  -     Hemoglobin A1C; Future; Expected date: 03/20/2023  -     Urinalysis; Future; Expected date: 03/20/2023  -     HIV 1/2 Ag/Ab (4th Gen); Future; Expected date: 03/20/2023  -     Microalbumin/Creatinine Ratio, Urine; Future; Expected date: 03/20/2023    14. Other chronic pain  Assessment & Plan:  On norco 7.5mg TID per pain management, Dr. Stockton, in [place]. She sees him monthly.     Orders:  -     CBC Auto Differential; Future; Expected date: 03/20/2023  -     Comprehensive Metabolic Panel; Future; Expected date: 03/20/2023  -     Lipid Panel; Future; Expected date: 03/20/2023  -     TSH; Future; Expected date: 03/20/2023  -     Hemoglobin A1C; Future; Expected date: 03/20/2023  -     Urinalysis; Future; Expected date: 03/20/2023  -     HIV 1/2 Ag/Ab (4th Gen); Future; Expected date: 03/20/2023  -     Microalbumin/Creatinine Ratio, Urine; Future; Expected date: 03/20/2023    15. Neuropathy  Assessment & Plan:  On gabapentin. Keep appts with pain mgmt    Orders:  -     CBC Auto Differential; Future; Expected date: 03/20/2023  -     Comprehensive Metabolic Panel; Future; Expected date: 03/20/2023  -     Lipid Panel; Future; Expected date:  03/20/2023  -     TSH; Future; Expected date: 03/20/2023  -     Hemoglobin A1C; Future; Expected date: 03/20/2023  -     Urinalysis; Future; Expected date: 03/20/2023  -     HIV 1/2 Ag/Ab (4th Gen); Future; Expected date: 03/20/2023  -     Microalbumin/Creatinine Ratio, Urine; Future; Expected date: 03/20/2023    16. Hypothyroidism, unspecified type  Assessment & Plan:  Lab Results   Component Value Date    TSH 2.5155 08/24/2021     States has not been taking synthroid. Caused headaches. Labs ordered    Orders:  -     CBC Auto Differential; Future; Expected date: 03/20/2023  -     Comprehensive Metabolic Panel; Future; Expected date: 03/20/2023  -     Lipid Panel; Future; Expected date: 03/20/2023  -     TSH; Future; Expected date: 03/20/2023  -     Hemoglobin A1C; Future; Expected date: 03/20/2023  -     Urinalysis; Future; Expected date: 03/20/2023  -     HIV 1/2 Ag/Ab (4th Gen); Future; Expected date: 03/20/2023  -     Microalbumin/Creatinine Ratio, Urine; Future; Expected date: 03/20/2023    17. Impaired mobility  Assessment & Plan:  Handicap form completed and given to patient 12/2022    Orders:  -     CBC Auto Differential; Future; Expected date: 03/20/2023  -     Comprehensive Metabolic Panel; Future; Expected date: 03/20/2023  -     Lipid Panel; Future; Expected date: 03/20/2023  -     TSH; Future; Expected date: 03/20/2023  -     Hemoglobin A1C; Future; Expected date: 03/20/2023  -     Urinalysis; Future; Expected date: 03/20/2023  -     HIV 1/2 Ag/Ab (4th Gen); Future; Expected date: 03/20/2023  -     Microalbumin/Creatinine Ratio, Urine; Future; Expected date: 03/20/2023    18. Cigarette smoker  Assessment & Plan:  Encouraged cessation. Referral placed.     Orders:  -     CBC Auto Differential; Future; Expected date: 03/20/2023  -     Comprehensive Metabolic Panel; Future; Expected date: 03/20/2023  -     Lipid Panel; Future; Expected date: 03/20/2023  -     TSH; Future; Expected date: 03/20/2023  -      Hemoglobin A1C; Future; Expected date: 03/20/2023  -     Urinalysis; Future; Expected date: 03/20/2023  -     HIV 1/2 Ag/Ab (4th Gen); Future; Expected date: 03/20/2023  -     Microalbumin/Creatinine Ratio, Urine; Future; Expected date: 03/20/2023  -     Ambulatory referral/consult to Smoking Cessation Program; Future; Expected date: 03/27/2023    19. Postmenopausal  Assessment & Plan:  dexa ordered.     Orders:  -     DXA Bone Density Axial Skeleton 1 or more sites; Future; Expected date: 03/20/2023    20. Class 1 obesity with serious comorbidity and body mass index (BMI) of 33.0 to 33.9 in adult, unspecified obesity type  Assessment & Plan:  Encouraged lifestyle change      Other orders  -     rivaroxaban (XARELTO) 10 mg Tab; Take 1 tablet (10 mg total) by mouth daily with dinner or evening meal.  Dispense: 90 tablet; Refill: 3  -     prasugreL (EFFIENT) 10 mg Tab; Take 1 tablet (10 mg total) by mouth once daily.  Dispense: 90 tablet; Refill: 3         Advance Care Planning   I attest to discussing Advance Care Planning with patient and/or family member.  Education was provided including the importance of the Health Care Power of , Advance Directives, and/or [place] documentation.  The patient expressed understanding to the importance of this information and discussion.  Length of ACP conversation in minutes: 16         Medication List with Changes/Refills   Current Medications    ALBUTEROL (PROAIR HFA) 90 MCG/ACTUATION INHALER    Inhale 2 puffs into the lungs every 6 (six) hours as needed for Wheezing or Shortness of Breath. Rescue       Start Date: 12/28/2022End Date: --    BELIMUMAB (BENLYSTA) 200 MG/ML SYRG    Inject 1 mL (200 mg total) into the skin once a week.       Start Date: 9/30/2022 End Date: --    CETIRIZINE (ZYRTEC) 10 MG TABLET    Take 1 tablet (10 mg total) by mouth once daily.       Start Date: 10/11/2022End Date: 11/10/2022    GABAPENTIN (NEURONTIN) 300 MG CAPSULE    Take 1 capsule (300 mg  total) by mouth once daily.       Start Date: 9/26/2022 End Date: --    HYDROCODONE-ACETAMINOPHEN (NORCO) 7.5-325 MG PER TABLET    Take 1 tablet by mouth every 8 (eight) hours as needed.       Start Date: 5/3/2022  End Date: --    HYDROXYCHLOROQUINE (PLAQUENIL) 200 MG TABLET    Take 200 mg by mouth 2 (two) times daily.       Start Date: 11/29/2021End Date: --    MELOXICAM (MOBIC) 15 MG TABLET    Take 15 mg by mouth daily as needed.       Start Date: 3/9/2023  End Date: --    MUPIROCIN (BACTROBAN) 2 % OINTMENT    Apply topically 3 (three) times daily.       Start Date: 6/14/2022 End Date: --    NALOXONE (NARCAN) 4 MG/ACTUATION SPRY    SMARTSIG:Both Nares       Start Date: 9/23/2022 End Date: --    OLUMIANT 2 MG TABLET    Take 1 tablet by mouth.       Start Date: 10/3/2022 End Date: --    SERTRALINE (ZOLOFT) 100 MG TABLET    Take 1 tablet (100 mg total) by mouth once daily.       Start Date: 12/21/2022End Date: 12/16/2023   Changed and/or Refilled Medications    Modified Medication Previous Medication    PRASUGREL (EFFIENT) 10 MG TAB prasugreL (EFFIENT) 10 mg Tab       Take 1 tablet (10 mg total) by mouth once daily.    TAKE ONE TABLET BY MOUTH ONE TIME DAILY       Start Date: 3/20/2023 End Date: 3/19/2024    Start Date: 1/23/2023 End Date: 3/20/2023    RIVAROXABAN (XARELTO) 10 MG TAB rivaroxaban (XARELTO) 10 mg Tab       Take 1 tablet (10 mg total) by mouth daily with dinner or evening meal.    Take 10 mg by mouth.       Start Date: 3/20/2023 End Date: 3/19/2024    Start Date: 8/24/2021 End Date: 3/20/2023   Discontinued Medications    MELOXICAM (MOBIC) 7.5 MG TABLET    Take 7.5 mg by mouth once daily.       Start Date: --        End Date: 3/20/2023        Follow up in about 1 year (around 3/20/2024) for Medicare Wellness with labs. In addition to their scheduled follow up, the patient has also been instructed to follow up on as needed basis.

## 2023-03-20 NOTE — ASSESSMENT & PLAN NOTE
Fasting labs ordered. Will call with results when available.  Mammogram   ordered  DEXA  ordered  Colonoscopy with Dr. whiting 9/2020      Advanced care planning discussed and paperwork given

## 2023-03-20 NOTE — ASSESSMENT & PLAN NOTE
Lab Results   Component Value Date    TSH 2.5155 08/24/2021     States has not been taking synthroid. Caused headaches. Labs ordered

## 2023-03-21 ENCOUNTER — PATIENT OUTREACH (OUTPATIENT)
Dept: ADMINISTRATIVE | Facility: HOSPITAL | Age: 53
End: 2023-03-21
Payer: MEDICARE

## 2023-03-21 NOTE — PROGRESS NOTES
Population Health. Out Reach.  The following record(s)  below were uploaded for Health Maintenance .    DM EYE EXAM     3/8/2023

## 2023-03-31 ENCOUNTER — LAB VISIT (OUTPATIENT)
Dept: LAB | Facility: HOSPITAL | Age: 53
End: 2023-03-31
Attending: FAMILY MEDICINE
Payer: MEDICARE

## 2023-03-31 DIAGNOSIS — Z00.00 MEDICARE ANNUAL WELLNESS VISIT, SUBSEQUENT: ICD-10-CM

## 2023-03-31 DIAGNOSIS — Z74.09 IMPAIRED MOBILITY: ICD-10-CM

## 2023-03-31 DIAGNOSIS — I77.89 LUPUS VASCULITIS: ICD-10-CM

## 2023-03-31 DIAGNOSIS — G89.29 OTHER CHRONIC PAIN: ICD-10-CM

## 2023-03-31 DIAGNOSIS — M32.19 LUPUS VASCULITIS: ICD-10-CM

## 2023-03-31 DIAGNOSIS — E11.69 HYPERLIPIDEMIA ASSOCIATED WITH TYPE 2 DIABETES MELLITUS: ICD-10-CM

## 2023-03-31 DIAGNOSIS — I50.9 CONGESTIVE HEART FAILURE, UNSPECIFIED HF CHRONICITY, UNSPECIFIED HEART FAILURE TYPE: ICD-10-CM

## 2023-03-31 DIAGNOSIS — E11.29 TYPE 2 DIABETES MELLITUS WITH OTHER DIABETIC KIDNEY COMPLICATION, WITHOUT LONG-TERM CURRENT USE OF INSULIN: ICD-10-CM

## 2023-03-31 DIAGNOSIS — I73.9 PERIPHERAL ARTERY DISEASE: ICD-10-CM

## 2023-03-31 DIAGNOSIS — Z12.31 BREAST CANCER SCREENING BY MAMMOGRAM: ICD-10-CM

## 2023-03-31 DIAGNOSIS — G62.9 NEUROPATHY: ICD-10-CM

## 2023-03-31 DIAGNOSIS — Z71.89 ADVANCED CARE PLANNING/COUNSELING DISCUSSION: ICD-10-CM

## 2023-03-31 DIAGNOSIS — R45.4 IRRITABLE MOOD: ICD-10-CM

## 2023-03-31 DIAGNOSIS — D68.62 LUPUS ANTICOAGULANT DISORDER: ICD-10-CM

## 2023-03-31 DIAGNOSIS — F17.210 CIGARETTE SMOKER: ICD-10-CM

## 2023-03-31 DIAGNOSIS — E78.5 HYPERLIPIDEMIA ASSOCIATED WITH TYPE 2 DIABETES MELLITUS: ICD-10-CM

## 2023-03-31 DIAGNOSIS — I73.9 PERIPHERAL VASCULAR DISEASE: ICD-10-CM

## 2023-03-31 DIAGNOSIS — M06.00 SERONEGATIVE RHEUMATOID ARTHRITIS: ICD-10-CM

## 2023-03-31 DIAGNOSIS — E03.9 HYPOTHYROIDISM, UNSPECIFIED TYPE: ICD-10-CM

## 2023-03-31 DIAGNOSIS — M32.10 SYSTEMIC LUPUS ERYTHEMATOSUS, ORGAN OR SYSTEM INVOLVEMENT UNSPECIFIED: ICD-10-CM

## 2023-03-31 LAB
APPEARANCE UR: CLEAR
BACTERIA #/AREA URNS AUTO: NORMAL /HPF
BILIRUB UR QL STRIP.AUTO: NEGATIVE MG/DL
COLOR UR AUTO: ABNORMAL
CREAT UR-MCNC: 43.6 MG/DL (ref 47–110)
GLUCOSE UR QL STRIP.AUTO: NEGATIVE MG/DL
KETONES UR QL STRIP.AUTO: NEGATIVE MG/DL
LEUKOCYTE ESTERASE UR QL STRIP.AUTO: NEGATIVE UNIT/L
MICROALBUMIN UR-MCNC: 1208.6 UG/ML
MICROALBUMIN/CREAT RATIO PNL UR: 2772 MG/GM CR (ref 0–30)
NITRITE UR QL STRIP.AUTO: NEGATIVE
PH UR STRIP.AUTO: 6.5 [PH]
PROT UR QL STRIP.AUTO: 100 MG/DL
RBC #/AREA URNS AUTO: NORMAL /HPF
RBC UR QL AUTO: ABNORMAL UNIT/L
SP GR UR STRIP.AUTO: 1.01
SQUAMOUS #/AREA URNS AUTO: NORMAL /HPF
UROBILINOGEN UR STRIP-ACNC: 0.2 MG/DL
WBC #/AREA URNS AUTO: NORMAL /HPF

## 2023-03-31 PROCEDURE — 82043 UR ALBUMIN QUANTITATIVE: CPT

## 2023-04-10 ENCOUNTER — HOSPITAL ENCOUNTER (OUTPATIENT)
Dept: RADIOLOGY | Facility: HOSPITAL | Age: 53
Discharge: HOME | End: 2023-04-10
Attending: FAMILY MEDICINE
Payer: MEDICARE

## 2023-04-10 DIAGNOSIS — Z12.31 BREAST CANCER SCREENING BY MAMMOGRAM: ICD-10-CM

## 2023-04-10 PROCEDURE — 77067 SCR MAMMO BI INCL CAD: CPT | Mod: 26,,, | Performed by: RADIOLOGY

## 2023-04-10 PROCEDURE — 77067 MAMMO DIGITAL SCREENING BILAT WITH TOMO: ICD-10-PCS | Mod: 26,,, | Performed by: RADIOLOGY

## 2023-04-10 PROCEDURE — 77063 MAMMO DIGITAL SCREENING BILAT WITH TOMO: ICD-10-PCS | Mod: 26,,, | Performed by: RADIOLOGY

## 2023-04-10 PROCEDURE — 77067 SCR MAMMO BI INCL CAD: CPT | Mod: TC

## 2023-04-10 PROCEDURE — 77063 BREAST TOMOSYNTHESIS BI: CPT | Mod: 26,,, | Performed by: RADIOLOGY

## 2023-04-17 ENCOUNTER — HOSPITAL ENCOUNTER (EMERGENCY)
Facility: HOSPITAL | Age: 53
Discharge: HOME | End: 2023-04-17
Attending: EMERGENCY MEDICINE
Payer: MEDICARE

## 2023-04-17 VITALS
OXYGEN SATURATION: 91 % | HEART RATE: 70 BPM | WEIGHT: 240.31 LBS | RESPIRATION RATE: 16 BRPM | SYSTOLIC BLOOD PRESSURE: 147 MMHG | TEMPERATURE: 98 F | HEIGHT: 71 IN | BODY MASS INDEX: 33.64 KG/M2 | DIASTOLIC BLOOD PRESSURE: 93 MMHG

## 2023-04-17 DIAGNOSIS — R10.32 LEFT INGUINAL PAIN: Primary | ICD-10-CM

## 2023-04-17 DIAGNOSIS — I72.4 ANEURYSM ARTERY, FEMORAL: ICD-10-CM

## 2023-04-17 LAB
ANION GAP SERPL CALC-SCNC: 6 MEQ/L
BASOPHILS # BLD AUTO: 0.07 X10(3)/MCL (ref 0–0.2)
BASOPHILS NFR BLD AUTO: 0.9 %
BUN SERPL-MCNC: 19.1 MG/DL (ref 9.8–20.1)
CALCIUM SERPL-MCNC: 9.1 MG/DL (ref 8.4–10.2)
CHLORIDE SERPL-SCNC: 107 MMOL/L (ref 98–107)
CO2 SERPL-SCNC: 23 MMOL/L (ref 22–29)
CREAT SERPL-MCNC: 0.9 MG/DL (ref 0.55–1.02)
CREAT/UREA NIT SERPL: 21
EOSINOPHIL # BLD AUTO: 0.24 X10(3)/MCL (ref 0–0.9)
EOSINOPHIL NFR BLD AUTO: 3 %
ERYTHROCYTE [DISTWIDTH] IN BLOOD BY AUTOMATED COUNT: 14 % (ref 11.5–17)
GFR SERPLBLD CREATININE-BSD FMLA CKD-EPI: >60 MLS/MIN/1.73/M2
GLUCOSE SERPL-MCNC: 131 MG/DL (ref 74–100)
HCT VFR BLD AUTO: 40.5 % (ref 37–47)
HGB BLD-MCNC: 13.2 G/DL (ref 12–16)
IMM GRANULOCYTES # BLD AUTO: 0.03 X10(3)/MCL (ref 0–0.04)
IMM GRANULOCYTES NFR BLD AUTO: 0.4 %
LYMPHOCYTES # BLD AUTO: 2.16 X10(3)/MCL (ref 0.6–4.6)
LYMPHOCYTES NFR BLD AUTO: 26.9 %
MCH RBC QN AUTO: 30.3 PG (ref 27–31)
MCHC RBC AUTO-ENTMCNC: 32.6 G/DL (ref 33–36)
MCV RBC AUTO: 92.9 FL (ref 80–94)
MONOCYTES # BLD AUTO: 0.38 X10(3)/MCL (ref 0.1–1.3)
MONOCYTES NFR BLD AUTO: 4.7 %
NEUTROPHILS # BLD AUTO: 5.16 X10(3)/MCL (ref 2.1–9.2)
NEUTROPHILS NFR BLD AUTO: 64.1 %
NRBC BLD AUTO-RTO: 0 %
PLATELET # BLD AUTO: 247 X10(3)/MCL (ref 130–400)
PMV BLD AUTO: 9.8 FL (ref 7.4–10.4)
POTASSIUM SERPL-SCNC: 3.9 MMOL/L (ref 3.5–5.1)
RBC # BLD AUTO: 4.36 X10(6)/MCL (ref 4.2–5.4)
SODIUM SERPL-SCNC: 136 MMOL/L (ref 136–145)
WBC # SPEC AUTO: 8 X10(3)/MCL (ref 4.5–11.5)

## 2023-04-17 PROCEDURE — 80048 BASIC METABOLIC PNL TOTAL CA: CPT | Performed by: EMERGENCY MEDICINE

## 2023-04-17 PROCEDURE — 96372 THER/PROPH/DIAG INJ SC/IM: CPT | Mod: 59 | Performed by: EMERGENCY MEDICINE

## 2023-04-17 PROCEDURE — 96374 THER/PROPH/DIAG INJ IV PUSH: CPT | Mod: 59

## 2023-04-17 PROCEDURE — 25500020 PHARM REV CODE 255

## 2023-04-17 PROCEDURE — 85025 COMPLETE CBC W/AUTO DIFF WBC: CPT | Performed by: EMERGENCY MEDICINE

## 2023-04-17 PROCEDURE — 99285 EMERGENCY DEPT VISIT HI MDM: CPT | Mod: 25

## 2023-04-17 PROCEDURE — 63600175 PHARM REV CODE 636 W HCPCS: Performed by: EMERGENCY MEDICINE

## 2023-04-17 PROCEDURE — 96376 TX/PRO/DX INJ SAME DRUG ADON: CPT

## 2023-04-17 RX ORDER — DEXAMETHASONE SODIUM PHOSPHATE 4 MG/ML
4 INJECTION, SOLUTION INTRA-ARTICULAR; INTRALESIONAL; INTRAMUSCULAR; INTRAVENOUS; SOFT TISSUE
Status: DISCONTINUED | OUTPATIENT
Start: 2023-04-17 | End: 2023-04-18 | Stop reason: HOSPADM

## 2023-04-17 RX ORDER — FENTANYL CITRATE 50 UG/ML
100 INJECTION, SOLUTION INTRAMUSCULAR; INTRAVENOUS
Status: COMPLETED | OUTPATIENT
Start: 2023-04-17 | End: 2023-04-17

## 2023-04-17 RX ADMIN — FENTANYL CITRATE 100 MCG: 50 INJECTION INTRAMUSCULAR; INTRAVENOUS at 10:04

## 2023-04-17 RX ADMIN — FENTANYL CITRATE 100 MCG: 50 INJECTION INTRAMUSCULAR; INTRAVENOUS at 06:04

## 2023-04-17 RX ADMIN — IOHEXOL 150 ML: 350 INJECTION, SOLUTION INTRAVENOUS at 08:04

## 2023-04-17 NOTE — LETTER
Patient: Vanesa Ricks  YOB: 1970  Date: 4/17/2023 Time: 10:05 PM  Location: [facility]    Leaving the Hospital Against Medical Advice    Chart #:[id number]    This will certify that I, the undersigned,    ______________________________________________________________________    A patient in the above named medical center, having requested discharge and removal from the medical center against the advice of my attending physician(s), hereby release [facility], its physicians, officers and employees, severally and individually, from any and all liability of any nature whatsoever for any injury or harm or complication of any kind that may result directly or indirectly, by reason of my terminating my stay as a patient at [facility] and my departure from [facility], and hereby waive any and all rights of action I may now have or later acquire as a result of my voluntary departure from [facility] and the termination of my stay as a patient therein.    This release is made with the full knowledge of the danger that may result from the action which I am taking.      Date:_______________________                         ___________________________                                                                                    Patient/Legal Representative    Witness:        ____________________________                          ___________________________  Nurse                                                                        Physician

## 2023-04-17 NOTE — ED PROVIDER NOTES
ED PROVIDER NOTE  4/17/2023    CHIEF COMPLAINT:   Chief Complaint   Patient presents with    Groin Pain     Lt groin pain started 2 days ago.  Femoral artery bypass appox 5 yrs ago in both legs.  Increase pain today.        HISTORY OF PRESENT ILLNESS:   Vanesa Ricks is a 53 y.o. female who presents with chief complaint Groin pain. Onset was a couple of days ago when she began having pain in her left groin that is constant, nothing seems to make it better.  States it feels like somebody hit her with a sledgehammer.  She has been taking hydrocodone without any improvement. She had fempop bypass years ago and has been taking her blood thinners.    The history is provided by the patient.       REVIEW OF SYSTEMS: as noted in the HPI.  NURSING NOTES REVIEWED      PAST MEDICAL/SURGICAL HISTORY:   Past Medical History:   Diagnosis Date    Systemic lupus erythematosus, organ or system involvement unspecified       Past Surgical History:   Procedure Laterality Date    HYSTERECTOMY  07/01/2013       FAMILY HISTORY: No family history on file.    SOCIAL HISTORY:   Social History     Tobacco Use    Smoking status: Every Day     Types: Cigarettes    Smokeless tobacco: Never    Tobacco comments:     patient reports she smokes marijuana   Substance Use Topics    Alcohol use: Yes     Comment: socially    Drug use: Never       ALLERGIES:   Review of patient's allergies indicates:   Allergen Reactions    Heparin Anaphylaxis    Vancomycin      Other reaction(s): Blotchy, Hives       PHYSICAL EXAM:  Initial Vitals [04/17/23 1724]   BP Pulse Resp Temp SpO2   (!) 165/96 76 18 98.2 °F (36.8 °C) 100 %      MAP       --         Physical Exam    Nursing note and vitals reviewed.  Constitutional: Vital signs are normal. She appears well-developed and well-nourished.   HENT:   Head: Normocephalic and atraumatic.   Mouth/Throat: Uvula is midline and mucous membranes are normal.   Eyes: EOM are normal. Pupils are equal, round, and  reactive to light.   Neck: Trachea normal. Neck supple.   Cardiovascular:  Normal rate, regular rhythm and intact distal pulses.           Pulses:       Dorsalis pedis pulses are 1+ on the right side and 1+ on the left side.        Posterior tibial pulses are 1+ on the right side and 1+ on the left side.   Pulmonary/Chest: Effort normal and breath sounds normal.   Abdominal: Abdomen is soft. Bowel sounds are normal. There is no abdominal tenderness. There is no rebound and no guarding.   Musculoskeletal:         General: Tenderness present. Normal range of motion.      Cervical back: Neck supple.      Left hip: Tenderness present. No deformity, bony tenderness or crepitus.     Neurological: She is alert and oriented to person, place, and time. GCS score is 15. GCS eye subscore is 4. GCS verbal subscore is 5. GCS motor subscore is 6.   Skin: Skin is warm and dry.   Psychiatric: She has a normal mood and affect. Her speech is normal. Thought content normal.       RESULTS:  Labs Reviewed   BASIC METABOLIC PANEL - Abnormal; Notable for the following components:       Result Value    Glucose Level 131 (*)     All other components within normal limits   CBC WITH DIFFERENTIAL - Abnormal; Notable for the following components:    MCHC 32.6 (*)     All other components within normal limits   CBC W/ AUTO DIFFERENTIAL    Narrative:     The following orders were created for panel order CBC auto differential.  Procedure                               Abnormality         Status                     ---------                               -----------         ------                     CBC with Differential[[id number]]        Abnormal            Final result                 Please view results for these tests on the individual orders.   EXTRA TUBES    Narrative:     The following orders were created for panel order EXTRA TUBES.  Procedure                               Abnormality         Status                     ---------                                -----------         ------                     Light Blue Top Hold[[id number]]                              In process                 Red Top Hold[[id number]]                                     In process                   Please view results for these tests on the individual orders.   LIGHT BLUE TOP HOLD   RED TOP HOLD     CTA Runoff ABD PEL Bilat Lower Ext             PROCEDURES:  Procedures    ECG:       ED COURSE AND MEDICAL DECISION MAKING:  Medications   fentaNYL injection 100 mcg (100 mcg Intravenous Given 4/17/23 1809)   iohexoL (OMNIPAQUE 350) 350 mg iodine/mL injection (150 mLs Intravenous Given 4/17/23 2000)   fentaNYL injection 100 mcg (100 mcg Intramuscular Given 4/17/23 2254)        Medical Decision Making  53-year-old female who presents with left groin pain over the past 2 days.  She has a history of fem-pop bypass, so CTA runoff was obtained to evaluate for possible aneurysm.  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  Given that this is the location of her pain and my concern was for having an aneurysm, I consulted vascular surgery.  They have seen and evaluated the patient and reviewed the imaging and feel that she is appropriate for discharge and we will follow up with her vascular surgeon tomorrow.  Given strict ED return precautions. I have spoken with the patient and/or caregivers. I have explained the patient's condition, diagnoses and treatment plan based on the information available to me at this time. I have answered the patient's and/or caregiver's questions and addressed any concerns. The patient and/or caregivers have as good an understanding of the patient's diagnosis, condition and treatment plan as can be expected at this point. The vital signs have been stable. The patient's condition is stable and appropriate for discharge from the emergency department.     The  patient will pursue further outpatient evaluation with the primary care physician or other designated or consulting physician as outlined in the discharge instructions. The patient and/or caregivers are agreeable to this plan of care and follow-up instructions have been explained in detail. The patient and/or caregivers have received these instructions in written format and have expressed an understanding of the discharge instructions. The patient and/or caregivers are aware that any significant change in condition or worsening of symptoms should prompt an immediate return to this or the closest emergency department or a call to 911.    Amount and/or Complexity of Data Reviewed  Labs: ordered.  Radiology: ordered.     Details: Impression:  1. The right superficial femoral artery is occluded. Again noted is complete occlusion of the right femoropopliteal bypass graft.  2. Again noted is occlusion of the distal superior mesenteric artery stent graft (series 14 image 46-53).  3. 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  4. There is occlusion of the right distal superficial femoral and popliteal artery stent graft. There is reconstitution of the trifurcation of the vessels, likely through collateral circulation.  5. The left femoropopliteal bypass graft is patent. There is interval worsening of focal stenosis at the distal end of the bypass graft (series 15 image 970). There is focal moderate stenosis of the proximal left common femoral artery (series 15 image 517), which has slightly worsened since the prior examination. There is a 2 cm fusiform aneurysm just proximal to the site of anastomosis. This is not significantly changed since in size the prior examination.  6. There is complete occlusion of the native left superficial femoral artery.  7. There is interval complete occlusion of a short segment of mid left popliteal artery  (series 15 image [id number]) with reconstitution of the distal segment through collateral circulation.  8. Details and other findings as discussed above.  Discussion of management or test interpretation with external provider(s): Seen and evaluated by vascular surgery who feels that it is unlikely that her groin pain is due to this aneurysm as it has not significantly changed from prior imaging.  Recommend close outpatient follow up with her vascular surgeon this week.    Risk  Parenteral controlled substances.  Decision regarding hospitalization.  Emergency major surgery.  Risk Details: I discussed occlusions and stenoses of her arterial system, although she does not have any evidence of acute ischemic limb at this time this is potential complication in the future should she develop any further stenosis resulting in total occlusion of the arteries supplying her lower extremities.        CLINICAL IMPRESSION:  1. Left inguinal pain    2. Aneurysm artery, femoral        DISPOSITION:   ED Disposition Condition    Discharge Stable            ED Prescriptions    None       Follow-up Information    None            Jamie Lovell DO  04/18/23 0257       Jamie Lovell DO  04/18/23 0303       Jamie Lovell DO  04/18/23 0305

## 2023-04-18 NOTE — MEDICAL/APP STUDENT
[facility]/General Surgery  CONSULT NOTE  Chief Complaint:   Left groin pain    History of Present Illness:  Vanesa Ricks is a 53 y.o. female with PMHx of lupus, PVD s/p bilateral fem pop bypass presenting after two days of left groin pain. She has chronic pain in distal legs bilaterally but notes a new sqeezing groin pain. She follows regularly with vascular surgery ( Dr. Fabian).     Review of Systems:  Negative other than stated in HPI on 10 point review of systems.     Allergies:  Review of patient's allergies indicates:   Allergen Reactions    Heparin Anaphylaxis    Vancomycin      Other reaction(s): Blotchy, Hives       Home Medications:  No current facility-administered medications on file prior to encounter.     Current Outpatient Medications on File Prior to Encounter   Medication Sig    albuterol (PROAIR HFA) 90 mcg/actuation inhaler Inhale 2 puffs into the lungs every 6 (six) hours as needed for Wheezing or Shortness of Breath. Rescue    belimumab (BENLYSTA) 200 mg/mL Syrg Inject 1 mL (200 mg total) into the skin once a week.    cetirizine (ZYRTEC) 10 MG tablet Take 1 tablet (10 mg total) by mouth once daily.    gabapentin (NEURONTIN) 300 MG capsule Take 1 capsule (300 mg total) by mouth once daily.    HYDROcodone-acetaminophen (NORCO) 7.5-325 mg per tablet Take 1 tablet by mouth every 8 (eight) hours as needed.    hydrOXYchloroQUINE (PLAQUENIL) 200 mg tablet Take 200 mg by mouth 2 (two) times daily.    meloxicam (MOBIC) 15 MG tablet Take 15 mg by mouth daily as needed.    mupirocin (BACTROBAN) 2 % ointment Apply topically 3 (three) times daily.    naloxone (NARCAN) 4 mg/actuation Spry SMARTSIG:Both Nares    OLUMIANT 2 mg tablet Take 1 tablet by mouth.    prasugreL (EFFIENT) 10 mg Tab Take 1 tablet (10 mg total) by mouth once daily.    rivaroxaban (XARELTO) 10 mg Tab Take 1 tablet (10 mg total) by mouth daily with dinner or evening meal.    sertraline (ZOLOFT) 100 MG tablet Take 1 tablet  (100 mg total) by mouth once daily.       Past Medical History:  Past Medical History:   Diagnosis Date    Systemic lupus erythematosus, organ or system involvement unspecified        Past Surgical History:  Past Surgical History:   Procedure Laterality Date    HYSTERECTOMY  07/01/2013       Family History:   No family history on file.    Social History:   Social History     Tobacco Use    Smoking status: Every Day     Types: Cigarettes    Smokeless tobacco: Never    Tobacco comments:     patient reports she smokes marijuana   Substance Use Topics    Alcohol use: Yes     Comment: socially    Drug use: Never        Vital Signs:  Temp: 98.2 °F (36.8 °C) (04/17/23 1724)  Pulse: 70 (04/17/23 2244)  Resp: 16 (04/17/23 2254)  BP: (!) 147/93 (04/17/23 2219)  SpO2: (!) 91 % (04/17/23 2059)    Physical Exam:  General: NAD  HEENT: normocephalic, mucous membranes moist, no scleral icterus  Neck: supple, symmetrical, no JVD  Lungs:  clear to auscultation bilaterally and normal respiratory effort  Cardiovascular: RRR  Extremities: moving all extremities, femoral pulses palpable bilaterally, Left distal pulses not locatable on doppler, Right PT monophasic, right DP not locatable   Abdomen: S/ND/NT, no masses/hernias  Skin: turgor normal. Intertrigo in groin bilaterally  Neurologic: No focal numbness or weakness  Psych/Behavioral:  A&Ox3, appropriate affect.    Laboratory:  BMP & CBC unremarkable    Diagnostic Results:  CT Abdomen Pelvis:  1. The right superficial femoral artery is occluded. Again noted is complete occlusion of the right femoropopliteal bypass graft.  2. Again noted is occlusion of the distal superior mesenteric artery stent graft (series 14 image 46-53).  3. 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  4. There is occlusion of the right distal superficial femoral and popliteal artery stent graft. There is reconstitution of the  trifurcation of the vessels, likely through collateral circulation.  5. The left femoropopliteal bypass graft is patent. There is interval worsening of focal stenosis at the distal end of the bypass graft (series 15 image 970). There is focal moderate stenosis of the proximal left common femoral artery (series 15 image 517), which has slightly worsened since the prior examination. There is a 2 cm fusiform aneurysm just proximal to the site of anastomosis. This is not significantly changed since in size the prior examination.  6. There is complete occlusion of the native left superficial femoral artery.  7. There is interval complete occlusion of a short segment of mid left popliteal artery (series 15 image [id number]) with reconstitution of the distal segment through collateral circulation.  8. Details and other findings as discussed above.       ASSESSMENT:     Vanesa Ricks is a 53 y.o. female with PMHx of ***    PLAN:     - follow up in clinic with Dr. Pillai or our clinic if needed    Aracely Beck, MS3  11:33 PM

## 2023-04-18 NOTE — CONSULTS
[facility]/General Surgery  CONSULT NOTE  Chief Complaint:   Left groin pain     History of Present Illness:  Vanesa Ricks is a 53 y.o. female with PMHx of lupus, PVD s/p bilateral fem pop bypass w/ multiple revisions at [facility] w/ Dr. Espinoza presenting after two days of left groin pain. She has chronic pain in distal legs bilaterally but notes a new sqeezing groin pain. She denies numbness, calf pain, and claudication stating her pain is isolated to her left groin. 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      Review of Systems:  Review of Systems   Constitutional:  Negative for chills, fever and malaise/fatigue.   Respiratory:  Negative for shortness of breath.    Cardiovascular:  Negative for chest pain, claudication, leg swelling and PND.   Gastrointestinal:  Negative for abdominal pain, nausea and vomiting.         Allergies:        Review of patient's allergies indicates:   Allergen Reactions    Heparin Anaphylaxis    Vancomycin         Other reaction(s): Blotchy, Hives         Home Medications:  No current facility-administered medications on file prior to encounter.           Current Outpatient Medications on File Prior to Encounter   Medication Sig    albuterol (PROAIR HFA) 90 mcg/actuation inhaler Inhale 2 puffs into the lungs every 6 (six) hours as needed for Wheezing or Shortness of Breath. Rescue    belimumab (BENLYSTA) 200 mg/mL Syrg Inject 1 mL (200 mg total) into the skin once a week.    cetirizine (ZYRTEC) 10 MG tablet Take 1 tablet (10 mg total) by mouth once daily.    gabapentin (NEURONTIN) 300 MG capsule Take 1 capsule (300 mg total) by mouth once daily.    HYDROcodone-acetaminophen (NORCO) 7.5-325 mg per tablet Take 1 tablet by mouth every 8 (eight) hours as needed.    hydrOXYchloroQUINE (PLAQUENIL) 200 mg tablet Take 200 mg by mouth 2 (two) times daily.     meloxicam (MOBIC) 15 MG tablet Take 15 mg by mouth daily as needed.    mupirocin (BACTROBAN) 2 % ointment Apply topically 3 (three) times daily.    naloxone (NARCAN) 4 mg/actuation Spry SMARTSIG:Both Nares    OLUMIANT 2 mg tablet Take 1 tablet by mouth.    prasugreL (EFFIENT) 10 mg Tab Take 1 tablet (10 mg total) by mouth once daily.    rivaroxaban (XARELTO) 10 mg Tab Take 1 tablet (10 mg total) by mouth daily with dinner or evening meal.    sertraline (ZOLOFT) 100 MG tablet Take 1 tablet (100 mg total) by mouth once daily.         Past Medical History:       Past Medical History:   Diagnosis Date    Systemic lupus erythematosus, organ or system involvement unspecified           Past Surgical History:        Past Surgical History:   Procedure Laterality Date    HYSTERECTOMY   07/01/2013         Family History:   No family history on file.     Social History:   Social History            Tobacco Use    Smoking status: Every Day       Types: Cigarettes    Smokeless tobacco: Never    Tobacco comments:       patient reports she smokes marijuana   Substance Use Topics    Alcohol use: Yes       Comment: socially    Drug use: Never         Vital Signs:  Temp: 98.2 °F (36.8 °C) (04/17/23 1724)  Pulse: 70 (04/17/23 2244)  Resp: 16 (04/17/23 2254)  BP: (!) 147/93 (04/17/23 2219)  SpO2: (!) 91 % (04/17/23 2059)     Physical Exam:  General: NAD  HEENT: normocephalic, mucous membranes moist, no scleral icterus  Neck: supple, symmetrical, no JVD  Lungs:  clear to auscultation bilaterally and normal respiratory effort  Cardiovascular: RRR  Extremities: moving all extremities, femoral pulses palpable bilaterally, Left distal pulses not locatable on doppler, Right/left PT monophasic, right DP not locatable   Abdomen: S/ND/NT, no masses/hernias  Skin: turgor normal. Intertrigo in groin bilaterally  Neurologic: No focal numbness or weakness  Psych/Behavioral:  A&Ox3, appropriate affect.     Laboratory:  BMP & CBC unremarkable      Diagnostic Results:  CT Abdomen Pelvis:  1. The right superficial femoral artery is occluded. Again noted is complete occlusion of the right femoropopliteal bypass graft.  2. Again noted is occlusion of the distal superior mesenteric artery stent graft (series 14 image [id number]).  3. 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  4. There is occlusion of the right distal superficial femoral and popliteal artery stent graft. There is reconstitution of the trifurcation of the vessels, likely through collateral circulation.  5. The left femoropopliteal bypass graft is patent. There is interval worsening of focal stenosis at the distal end of the bypass graft (series 15 image [id number]). There is focal moderate stenosis of the proximal left common femoral artery (series 15 image 517), which has slightly worsened since the prior examination. There is a 2 cm fusiform aneurysm just proximal to the site of anastomosis. This is not significantly changed since in size the prior examination.  6. There is complete occlusion of the native left superficial femoral artery.  7. There is interval complete occlusion of a short segment of mid left popliteal artery (series 15 image [id number]) with reconstitution of the distal segment through collateral circulation.  8. Details and other findings as discussed above.        ASSESSMENT:      Vanesa Ricks is a 53 y.o. female with PMHx of lupus vasculitis and PAD s/p SMA stenting and b/l fem pop bypass who presents with left groin pain with CTA showing 2cm aneurysm at anastomosis site that has been present since 8/2022. No concern for acute aneurysm formation or bypass graft infection. Patient being followed by vascular surgery at [facility] and states she can get an appointment for repeat evaluation as soon as tomorrow. She has been told there is no plans for intervention on bypass occlusion and current  plan is to not revise bypass as long as patient is not having severe claudication symptoms.      PLAN:      - follow up w/ established vascular surgeon, Dr. Espinoza   -Patient given information to follow up at our vascular clinic if she is having any issues being seen   -No indication for acute surgical intervention at this time. No concern for acute aneurysm formation, graft infection, or limb ischemia      Aron Dsouza MD  PGY-1   [facility] General Surgery

## 2023-04-19 NOTE — ASSESSMENT & PLAN NOTE
Keep appointments with cards- dr. Villa. On blood thinners. Encouraged smoking cessation   Continue Regimen: Apply DUOBRII to ear QHS\\nZoryve 0.3 % topical cream Apply to scalp and ears every other day Detail Level: Zone Render In Strict Bullet Format?: No Continue Regimen: Apply olux foam daily \\nApply dermasmooth QHS

## 2023-04-24 NOTE — TELEPHONE ENCOUNTER
----- Message from Veronique Russo sent at 4/24/2023  4:13 PM CDT -----  Regarding: med refill  .Type:  RX Refill Request    Who Called: Pt  Refill or New Rx:Refill  RX Name and Strength:prasugreL (EFFIENT) 10 mg Tab  How is the patient currently taking it? (ex. 1XDay):1xday  Is this a 30 day or 90 day RX:90  Preferred Pharmacy with phone number:[id number]  [place] - [address]  Local or Mail Order:Local  Ordering Provider:Janessa  Would the patient rather a call back or a response via [facility]? Call back   Best Call Back Number:[phone]  Additional Information:

## 2023-04-25 RX ORDER — PRASUGREL 10 MG/1
10 TABLET, FILM COATED ORAL DAILY
Qty: 90 TABLET | Refills: 3 | Status: SHIPPED | OUTPATIENT
Start: 2023-04-25 | End: 2024-04-24

## 2023-04-27 ENCOUNTER — OFFICE VISIT (OUTPATIENT)
Dept: CARDIAC SURGERY | Facility: CLINIC | Age: 53
End: 2023-04-27
Payer: MEDICARE

## 2023-04-27 VITALS
WEIGHT: 238.63 LBS | HEIGHT: 71 IN | HEART RATE: 89 BPM | OXYGEN SATURATION: 95 % | BODY MASS INDEX: 33.41 KG/M2 | DIASTOLIC BLOOD PRESSURE: 79 MMHG | SYSTOLIC BLOOD PRESSURE: 128 MMHG

## 2023-04-27 DIAGNOSIS — M32.9 SYSTEMIC LUPUS ERYTHEMATOSUS, UNSPECIFIED SLE TYPE, UNSPECIFIED ORGAN INVOLVEMENT STATUS: Primary | ICD-10-CM

## 2023-04-27 PROCEDURE — 3008F PR BODY MASS INDEX (BMI) DOCUMENTED: ICD-10-PCS | Mod: CPTII,,, | Performed by: THORACIC SURGERY (CARDIOTHORACIC VASCULAR SURGERY)

## 2023-04-27 PROCEDURE — 3066F NEPHROPATHY DOC TX: CPT | Mod: CPTII,,, | Performed by: THORACIC SURGERY (CARDIOTHORACIC VASCULAR SURGERY)

## 2023-04-27 PROCEDURE — 3074F SYST BP LT 130 MM HG: CPT | Mod: CPTII,,, | Performed by: THORACIC SURGERY (CARDIOTHORACIC VASCULAR SURGERY)

## 2023-04-27 PROCEDURE — 3078F PR MOST RECENT DIASTOLIC BLOOD PRESSURE < 80 MM HG: ICD-10-PCS | Mod: CPTII,,, | Performed by: THORACIC SURGERY (CARDIOTHORACIC VASCULAR SURGERY)

## 2023-04-27 PROCEDURE — 99212 OFFICE O/P EST SF 10 MIN: CPT | Mod: ,,, | Performed by: THORACIC SURGERY (CARDIOTHORACIC VASCULAR SURGERY)

## 2023-04-27 PROCEDURE — 3062F PR POS MACROALBUMINURIA RESULT DOCUMENTED/REVIEW: ICD-10-PCS | Mod: CPTII,,, | Performed by: THORACIC SURGERY (CARDIOTHORACIC VASCULAR SURGERY)

## 2023-04-27 PROCEDURE — 1159F PR MEDICATION LIST DOCUMENTED IN MEDICAL RECORD: ICD-10-PCS | Mod: CPTII,,, | Performed by: THORACIC SURGERY (CARDIOTHORACIC VASCULAR SURGERY)

## 2023-04-27 PROCEDURE — 3066F PR DOCUMENTATION OF TREATMENT FOR NEPHROPATHY: ICD-10-PCS | Mod: CPTII,,, | Performed by: THORACIC SURGERY (CARDIOTHORACIC VASCULAR SURGERY)

## 2023-04-27 PROCEDURE — 3062F POS MACROALBUMINURIA REV: CPT | Mod: CPTII,,, | Performed by: THORACIC SURGERY (CARDIOTHORACIC VASCULAR SURGERY)

## 2023-04-27 PROCEDURE — 3008F BODY MASS INDEX DOCD: CPT | Mod: CPTII,,, | Performed by: THORACIC SURGERY (CARDIOTHORACIC VASCULAR SURGERY)

## 2023-04-27 PROCEDURE — 3074F PR MOST RECENT SYSTOLIC BLOOD PRESSURE < 130 MM HG: ICD-10-PCS | Mod: CPTII,,, | Performed by: THORACIC SURGERY (CARDIOTHORACIC VASCULAR SURGERY)

## 2023-04-27 PROCEDURE — 3078F DIAST BP <80 MM HG: CPT | Mod: CPTII,,, | Performed by: THORACIC SURGERY (CARDIOTHORACIC VASCULAR SURGERY)

## 2023-04-27 PROCEDURE — 1159F MED LIST DOCD IN RCRD: CPT | Mod: CPTII,,, | Performed by: THORACIC SURGERY (CARDIOTHORACIC VASCULAR SURGERY)

## 2023-04-27 PROCEDURE — 99212 PR OFFICE/OUTPT VISIT, EST, LEVL II, 10-19 MIN: ICD-10-PCS | Mod: ,,, | Performed by: THORACIC SURGERY (CARDIOTHORACIC VASCULAR SURGERY)

## 2023-04-27 NOTE — PROGRESS NOTES
Subjective:      Patient ID: Vanesa Ricks is a 53 y.o. female.    Chief Complaint: Pre-op Exam (Occlusion of Rt fem pop bypass CTA R/O  ABD/PEL,BLE C/O pain in legs, states has spasms every night.)      HPI:  The patient returns with symptoms consistent with mild claudication in the lower extremities, right more so than left.  Her main complaint is that when resting at night she can get sudden onset of cramps and a locked up sensation in both groins.  Review of her most recent CT angiogram shows that aortoiliac inflow is widely patent.  Her left femoral-popliteal bypass is widely patent.  There is a small right femoral pseudoaneurysm which is uncomplicated.  Her right femoral and popliteal arteries are occluded and there is two-vessel runoff below-the-knee.  This is unchanged from her last exam.  The patient has no available saphenous vein and her surgical options are severely limited to non autologous graft or cadaver vein, both of which have very poor long-term outlook.  I referred her back to her primary care for the treatment of her night cramps.      Current Outpatient Medications:     albuterol (PROAIR HFA) 90 mcg/actuation inhaler, Inhale 2 puffs into the lungs every 6 (six) hours as needed for Wheezing or Shortness of Breath. Rescue, Disp: 18 g, Rfl: 0    belimumab (BENLYSTA) 200 mg/mL Syrg, Inject 1 mL (200 mg total) into the skin once a week., Disp: 4 each, Rfl: 11    gabapentin (NEURONTIN) 300 MG capsule, Take 1 capsule (300 mg total) by mouth once daily., Disp: 30 capsule, Rfl: 3    HYDROcodone-acetaminophen (NORCO) 7.5-325 mg per tablet, Take 1 tablet by mouth every 8 (eight) hours as needed., Disp: , Rfl:     hydrOXYchloroQUINE (PLAQUENIL) 200 mg tablet, Take 200 mg by mouth 2 (two) times daily., Disp: , Rfl:     meloxicam (MOBIC) 15 MG tablet, Take 15 mg by mouth daily as needed., Disp: , Rfl:     naloxone (NARCAN) 4 mg/actuation Spry, SMARTSIG:Both Nares, Disp: , Rfl:     OLUMIANT 2 mg  "tablet, Take 1 tablet by mouth., Disp: , Rfl:     prasugreL (EFFIENT) 10 mg Tab, Take 1 tablet (10 mg total) by mouth once daily., Disp: 90 tablet, Rfl: 3    rivaroxaban (XARELTO) 10 mg Tab, Take 1 tablet (10 mg total) by mouth daily with dinner or evening meal., Disp: 90 tablet, Rfl: 3    sertraline (ZOLOFT) 100 MG tablet, Take 1 tablet (100 mg total) by mouth once daily., Disp: 30 tablet, Rfl: 11    cetirizine (ZYRTEC) 10 MG tablet, Take 1 tablet (10 mg total) by mouth once daily., Disp: 30 tablet, Rfl: 0    mupirocin (BACTROBAN) 2 % ointment, Apply topically 3 (three) times daily. (Patient not taking: Reported on 4/27/2023), Disp: 30 g, Rfl: 3  Review of patient's allergies indicates:   Allergen Reactions    Heparin Anaphylaxis    Vancomycin      Other reaction(s): Blotchy, Hives    Opioids - morphine analogues Nausea And Vomiting       /79 (BP Location: Left arm, Patient Position: Sitting, BP Method: Medium (Automatic))   Pulse 89   Ht 5' 11" (1.803 m)   Wt 108.2 kg (238 lb 9.6 oz)   LMP  (LMP Unknown)   SpO2 95%   BMI 33.28 kg/m²     Review of Systems   Constitutional: Negative.   HENT: Negative.    Eyes: Negative.    Cardiovascular: Negative.    Respiratory: Negative.    Endocrine: Negative.    Hematologic/Lymphatic: Negative.    Skin: Negative.    Musculoskeletal: Negative.    Gastrointestinal: Negative.    Genitourinary: Negative.    Neurological: Negative.    Psychiatric/Behavioral: Negative.    Allergic/Immunologic: Negative.        Objective:     Constitutional:  No acute distress  HENT:    Eyes:  PERRLA  Cardiovascular:   Respiratory:   Endocrine:  Hematologic/Lymphatic:   Skin:  No dependent erythema  Musculoskeletal:  Right 1st toe is surgically absent  Gastrointestinal:   Genitourinary:   Neurological:   Psychiatric/Behavioral:   Extremities:  No cyanosis clubbing or edema    Assessment:  Essentially unchanged vascular status with no surgical option     Imaging:       Plan:  Return visit " verena

## 2023-05-03 RX ORDER — HYDROXYCHLOROQUINE SULFATE 200 MG/1
200 TABLET, FILM COATED ORAL 2 TIMES DAILY
Qty: 180 TABLET | Refills: 3 | OUTPATIENT
Start: 2023-05-03

## 2023-05-03 NOTE — TELEPHONE ENCOUNTER
I do not prescribe this med for patient. This is prescribed by dr. Jacobsen.  She needs to contact his office for refills. This med requires monitoring by prescribing md

## 2023-05-05 ENCOUNTER — OFFICE VISIT (OUTPATIENT)
Dept: RHEUMATOLOGY | Facility: CLINIC | Age: 53
End: 2023-05-05
Payer: MEDICARE

## 2023-05-05 VITALS
DIASTOLIC BLOOD PRESSURE: 85 MMHG | WEIGHT: 237 LBS | TEMPERATURE: 99 F | RESPIRATION RATE: 18 BRPM | SYSTOLIC BLOOD PRESSURE: 128 MMHG | BODY MASS INDEX: 33.18 KG/M2 | HEIGHT: 71 IN | HEART RATE: 76 BPM | OXYGEN SATURATION: 92 %

## 2023-05-05 DIAGNOSIS — M32.19 LUPUS VASCULITIS: ICD-10-CM

## 2023-05-05 DIAGNOSIS — I77.89 LUPUS VASCULITIS: ICD-10-CM

## 2023-05-05 DIAGNOSIS — G62.9 NEUROPATHY: ICD-10-CM

## 2023-05-05 DIAGNOSIS — Q89.01 ASPLENIA: ICD-10-CM

## 2023-05-05 DIAGNOSIS — M32.10 SYSTEMIC LUPUS ERYTHEMATOSUS, ORGAN OR SYSTEM INVOLVEMENT UNSPECIFIED: ICD-10-CM

## 2023-05-05 DIAGNOSIS — K21.9 GASTROESOPHAGEAL REFLUX DISEASE, UNSPECIFIED WHETHER ESOPHAGITIS PRESENT: ICD-10-CM

## 2023-05-05 DIAGNOSIS — M32.9 LUPUS: ICD-10-CM

## 2023-05-05 DIAGNOSIS — D68.62 LUPUS ANTICOAGULANT DISORDER: ICD-10-CM

## 2023-05-05 DIAGNOSIS — M06.00 SERONEGATIVE RHEUMATOID ARTHRITIS: ICD-10-CM

## 2023-05-05 DIAGNOSIS — M06.00 SERONEGATIVE RHEUMATOID ARTHRITIS: Primary | ICD-10-CM

## 2023-05-05 PROCEDURE — 3066F NEPHROPATHY DOC TX: CPT | Mod: CPTII,S$GLB,, | Performed by: INTERNAL MEDICINE

## 2023-05-05 PROCEDURE — 3062F POS MACROALBUMINURIA REV: CPT | Mod: CPTII,S$GLB,, | Performed by: INTERNAL MEDICINE

## 2023-05-05 PROCEDURE — 3062F PR POS MACROALBUMINURIA RESULT DOCUMENTED/REVIEW: ICD-10-PCS | Mod: CPTII,S$GLB,, | Performed by: INTERNAL MEDICINE

## 2023-05-05 PROCEDURE — 3066F PR DOCUMENTATION OF TREATMENT FOR NEPHROPATHY: ICD-10-PCS | Mod: CPTII,S$GLB,, | Performed by: INTERNAL MEDICINE

## 2023-05-05 PROCEDURE — 99999 PR PBB SHADOW E&M-EST. PATIENT-LVL IV: CPT | Mod: PBBFAC,,, | Performed by: INTERNAL MEDICINE

## 2023-05-05 PROCEDURE — 3008F PR BODY MASS INDEX (BMI) DOCUMENTED: ICD-10-PCS | Mod: CPTII,S$GLB,, | Performed by: INTERNAL MEDICINE

## 2023-05-05 PROCEDURE — 1159F PR MEDICATION LIST DOCUMENTED IN MEDICAL RECORD: ICD-10-PCS | Mod: CPTII,S$GLB,, | Performed by: INTERNAL MEDICINE

## 2023-05-05 PROCEDURE — 3079F PR MOST RECENT DIASTOLIC BLOOD PRESSURE 80-89 MM HG: ICD-10-PCS | Mod: CPTII,S$GLB,, | Performed by: INTERNAL MEDICINE

## 2023-05-05 PROCEDURE — 99214 PR OFFICE/OUTPT VISIT, EST, LEVL IV, 30-39 MIN: ICD-10-PCS | Mod: S$GLB,,, | Performed by: INTERNAL MEDICINE

## 2023-05-05 PROCEDURE — 3008F BODY MASS INDEX DOCD: CPT | Mod: CPTII,S$GLB,, | Performed by: INTERNAL MEDICINE

## 2023-05-05 PROCEDURE — 3074F SYST BP LT 130 MM HG: CPT | Mod: CPTII,S$GLB,, | Performed by: INTERNAL MEDICINE

## 2023-05-05 PROCEDURE — 99999 PR PBB SHADOW E&M-EST. PATIENT-LVL IV: ICD-10-PCS | Mod: PBBFAC,,, | Performed by: INTERNAL MEDICINE

## 2023-05-05 PROCEDURE — 99214 OFFICE O/P EST MOD 30 MIN: CPT | Mod: S$GLB,,, | Performed by: INTERNAL MEDICINE

## 2023-05-05 PROCEDURE — 1159F MED LIST DOCD IN RCRD: CPT | Mod: CPTII,S$GLB,, | Performed by: INTERNAL MEDICINE

## 2023-05-05 PROCEDURE — 3074F PR MOST RECENT SYSTOLIC BLOOD PRESSURE < 130 MM HG: ICD-10-PCS | Mod: CPTII,S$GLB,, | Performed by: INTERNAL MEDICINE

## 2023-05-05 PROCEDURE — 3079F DIAST BP 80-89 MM HG: CPT | Mod: CPTII,S$GLB,, | Performed by: INTERNAL MEDICINE

## 2023-05-05 RX ORDER — BELIMUMAB 200 MG/ML
200 SOLUTION SUBCUTANEOUS WEEKLY
Qty: 4 EACH | Refills: 11 | Status: SHIPPED | OUTPATIENT
Start: 2023-05-05 | End: 2023-10-24

## 2023-05-05 RX ORDER — METHOCARBAMOL 500 MG/1
500 TABLET, FILM COATED ORAL 2 TIMES DAILY
Qty: 180 TABLET | Refills: 3 | Status: SHIPPED | OUTPATIENT
Start: 2023-05-05 | End: 2023-09-05 | Stop reason: SDUPTHER

## 2023-05-05 RX ORDER — HYDROXYCHLOROQUINE SULFATE 200 MG/1
200 TABLET, FILM COATED ORAL 2 TIMES DAILY
Qty: 180 TABLET | Refills: 3 | Status: SHIPPED | OUTPATIENT
Start: 2023-05-05 | End: 2023-09-05 | Stop reason: SDUPTHER

## 2023-05-05 RX ORDER — ZOLPIDEM TARTRATE 10 MG/1
10 TABLET ORAL NIGHTLY
Qty: 90 TABLET | Refills: 3 | Status: SHIPPED | OUTPATIENT
Start: 2023-05-05 | End: 2023-09-05 | Stop reason: SDUPTHER

## 2023-05-05 RX ORDER — GABAPENTIN 300 MG/1
300 CAPSULE ORAL 2 TIMES DAILY
Qty: 180 CAPSULE | Refills: 3 | Status: SHIPPED | OUTPATIENT
Start: 2023-05-05 | End: 2023-09-05 | Stop reason: SDUPTHER

## 2023-05-05 RX ORDER — MOMETASONE FUROATE 1 MG/G
CREAM TOPICAL NIGHTLY
Qty: 135 G | Refills: 3 | Status: SHIPPED | OUTPATIENT
Start: 2023-05-05 | End: 2023-08-17

## 2023-05-05 RX ORDER — BELIMUMAB 200 MG/ML
200 SOLUTION SUBCUTANEOUS WEEKLY
Qty: 4 EACH | Refills: 11 | Status: SHIPPED | OUTPATIENT
Start: 2023-05-05 | End: 2023-05-05 | Stop reason: SDUPTHER

## 2023-05-05 RX ORDER — MYCOPHENOLATE MOFETIL 500 MG/1
1000 TABLET ORAL
Qty: 180 TABLET | Refills: 3 | Status: SHIPPED | OUTPATIENT
Start: 2023-05-05 | End: 2023-09-05 | Stop reason: SDUPTHER

## 2023-05-05 RX ORDER — LANOLIN ALCOHOL/MO/W.PET/CERES
400 CREAM (GRAM) TOPICAL NIGHTLY
Qty: 90 TABLET | Refills: 3 | Status: SHIPPED | OUTPATIENT
Start: 2023-05-05 | End: 2023-09-05 | Stop reason: SDUPTHER

## 2023-05-05 NOTE — PROGRESS NOTES
"Subjective:       Patient ID: Vanesa Ricks is a 53 y.o. female.    Chief Complaint: followup (The pt states she would like to talk to you about getting back on olumiant)    The patient is complaining of joint pain involving the MCP PIP wrist elbow shoulders hips knees and ankles bilaterally.  The pain is 8/10 in intensity dull in quality and continuous.  That is associated with a morning stiffness lasting for more than 60 minutes.  Is also having difficulty maintaining a good night of sleep.  This has been associated with myalgias.  Muscle aches are 7/10 in intensity dull in quality and continuous.  They are associated with fatigue.  No fever no chills no others.      Review of Systems   Constitutional:  Negative for appetite change, chills and fever.   HENT:  Negative for congestion, ear pain, mouth sores, nosebleeds and trouble swallowing.    Eyes:  Negative for photophobia and discharge.   Respiratory:  Negative for chest tightness and shortness of breath.    Cardiovascular:  Negative for chest pain.   Gastrointestinal:  Negative for abdominal pain and vomiting.   Endocrine: Negative.    Genitourinary:  Negative for hematuria.   Musculoskeletal:         As per HPI   Skin:  Positive for rash.   Neurological:  Negative for weakness.       Objective:   /85 (BP Location: Right leg, Patient Position: Sitting, BP Method: X-Large (Automatic))   Pulse 76   Temp 98.5 °F (36.9 °C) (Oral)   Resp 18   Ht 5' 11" (1.803 m)   Wt 107.5 kg (237 lb)   LMP  (LMP Unknown)   SpO2 (!) 92%   BMI 33.05 kg/m²      Physical Exam   Constitutional: She is oriented to person, place, and time. She appears well-developed and well-nourished. No distress.   HENT:   Head: Normocephalic and atraumatic.   Right Ear: External ear normal.   Left Ear: External ear normal.   Eyes: Pupils are equal, round, and reactive to light.   Cardiovascular: Normal rate, regular rhythm and normal heart sounds.   Pulmonary/Chest: Breath " sounds normal.   Abdominal: Soft. There is no abdominal tenderness.   Musculoskeletal:      Right shoulder: Tenderness present.      Left shoulder: Tenderness present.      Right elbow: Tenderness present.      Left elbow: Tenderness present.      Right wrist: Tenderness present.      Left wrist: Tenderness present.      Cervical back: Neck supple.      Right hip: Tenderness present.      Left hip: Tenderness present.      Right knee: Tenderness present.      Left knee: Tenderness present.      Right ankle: Tenderness present.      Left ankle: Tenderness present.   Lymphadenopathy:     She has no cervical adenopathy.   Neurological: She is alert and oriented to person, place, and time. She displays normal reflexes. No cranial nerve deficit or sensory deficit. She exhibits normal muscle tone. Coordination normal.   Skin: Rash noted.   Vitals reviewed.      Right Side Rheumatological Exam     The patient is tender to palpation of the shoulder, elbow, wrist, knee, 1st PIP, 1st MCP, 2nd PIP, 2nd MCP, 3rd PIP, 3rd MCP, 4th PIP, 4th MCP, 5th PIP, hip, ankle, 1st MTP, 2nd MTP, 3rd MTP, 4th MTP, 5th MTP, 1st toe IP, 2nd toe IP, 3rd toe IP, 4th toe IP and 5th toe IP    Left Side Rheumatological Exam     The patient is tender to palpation of the shoulder, elbow, wrist, knee, 1st PIP, 1st MCP, 2nd PIP, 2nd MCP, 3rd PIP, 3rd MCP, 4th PIP, 4th MCP, 5th PIP, 5th MCP, hip, ankle, 1st MTP, 2nd MTP, 3rd MTP, 4th MTP, 5th MTP, 1st toe IP, 2nd toe IP, 3rd toe IP, 4th toe IP and 5th toe IP.       Completed Fibromyalgia exam 18/18 tender points.  No data to display     Assessment:       1. Seronegative rheumatoid arthritis    2. Systemic lupus erythematosus, organ or system involvement unspecified    3. Lupus vasculitis    4. Lupus anticoagulant disorder    5. Asplenia    6. Gastroesophageal reflux disease, unspecified whether esophagitis present    7. Neuropathy          Medication List with Changes/Refills   New Medications     MAGNESIUM OXIDE (MAG-OX) 400 MG (241.3 MG MAGNESIUM) TABLET    Take 1 tablet (400 mg total) by mouth nightly.       Start Date: 5/5/2023  End Date: --    METHOCARBAMOL (ROBAXIN) 500 MG TAB    Take 1 tablet (500 mg total) by mouth 2 (two) times a day.       Start Date: 5/5/2023  End Date: --    MOMETASONE 0.1% (ELOCON) 0.1 % CREAM    Apply topically every evening.       Start Date: 5/5/2023  End Date: --    MYCOPHENOLATE (CELLCEPT) 500 MG TAB    Take 2 tablets (1,000 mg total) by mouth daily with dinner or evening meal.       Start Date: 5/5/2023  End Date: 5/4/2024    ZOLPIDEM (AMBIEN) 10 MG TAB    Take 1 tablet (10 mg total) by mouth every evening.       Start Date: 5/5/2023  End Date: 11/1/2023   Current Medications    ALBUTEROL (PROAIR HFA) 90 MCG/ACTUATION INHALER    Inhale 2 puffs into the lungs every 6 (six) hours as needed for Wheezing or Shortness of Breath. Rescue       Start Date: 12/28/2022End Date: --    CETIRIZINE (ZYRTEC) 10 MG TABLET    Take 1 tablet (10 mg total) by mouth once daily.       Start Date: 10/11/2022End Date: 11/10/2022    HYDROCODONE-ACETAMINOPHEN (NORCO) 7.5-325 MG PER TABLET    Take 1 tablet by mouth every 8 (eight) hours as needed.       Start Date: 5/3/2022  End Date: --    MUPIROCIN (BACTROBAN) 2 % OINTMENT    Apply topically 3 (three) times daily.       Start Date: 6/14/2022 End Date: --    NALOXONE (NARCAN) 4 MG/ACTUATION SPRY    SMARTSIG:Both Nares       Start Date: 9/23/2022 End Date: --    PRASUGREL (EFFIENT) 10 MG TAB    Take 1 tablet (10 mg total) by mouth once daily.       Start Date: 4/25/2023 End Date: 4/24/2024    RIVAROXABAN (XARELTO) 10 MG TAB    Take 1 tablet (10 mg total) by mouth daily with dinner or evening meal.       Start Date: 3/20/2023 End Date: 3/19/2024    SERTRALINE (ZOLOFT) 100 MG TABLET    Take 1 tablet (100 mg total) by mouth once daily.       Start Date: 12/21/2022End Date: 12/16/2023   Changed and/or Refilled Medications    Modified Medication Previous  Medication    BELIMUMAB (BENLYSTA) 200 MG/ML SYRG belimumab (BENLYSTA) 200 mg/mL Syrg       Inject 1 mL (200 mg total) into the skin once a week.    Inject 1 mL (200 mg total) into the skin once a week.       Start Date: 5/5/2023  End Date: --    Start Date: 9/30/2022 End Date: 5/5/2023    GABAPENTIN (NEURONTIN) 300 MG CAPSULE gabapentin (NEURONTIN) 300 MG capsule       Take 1 capsule (300 mg total) by mouth 2 (two) times daily.    Take 1 capsule (300 mg total) by mouth once daily.       Start Date: 5/5/2023  End Date: --    Start Date: 9/26/2022 End Date: 5/5/2023    HYDROXYCHLOROQUINE (PLAQUENIL) 200 MG TABLET hydrOXYchloroQUINE (PLAQUENIL) 200 mg tablet       Take 1 tablet (200 mg total) by mouth 2 (two) times daily.    Take 200 mg by mouth 2 (two) times daily.       Start Date: 5/5/2023  End Date: --    Start Date: 11/29/2021End Date: 5/5/2023   Discontinued Medications    MELOXICAM (MOBIC) 15 MG TABLET    Take 15 mg by mouth daily as needed.       Start Date: 3/9/2023  End Date: 5/5/2023    OLUMIANT 2 MG TABLET    Take 1 tablet by mouth.       Start Date: 10/3/2022 End Date: 5/5/2023         Plan:         Problem List Items Addressed This Visit          Neuro    Neuropathy    Relevant Medications    gabapentin (NEURONTIN) 300 MG capsule    hydrOXYchloroQUINE (PLAQUENIL) 200 mg tablet    zolpidem (AMBIEN) 10 mg Tab    mycophenolate (CELLCEPT) 500 mg Tab    mometasone 0.1% (ELOCON) 0.1 % cream    magnesium oxide (MAG-OX) 400 mg (241.3 mg magnesium) tablet    methocarbamoL (ROBAXIN) 500 MG Tab       Immunology/Multi System    Seronegative rheumatoid arthritis - Primary    Relevant Medications    gabapentin (NEURONTIN) 300 MG capsule    hydrOXYchloroQUINE (PLAQUENIL) 200 mg tablet    zolpidem (AMBIEN) 10 mg Tab    mycophenolate (CELLCEPT) 500 mg Tab    mometasone 0.1% (ELOCON) 0.1 % cream    magnesium oxide (MAG-OX) 400 mg (241.3 mg magnesium) tablet    methocarbamoL (ROBAXIN) 500 MG Tab    Lupus vasculitis     Relevant Medications    gabapentin (NEURONTIN) 300 MG capsule    hydrOXYchloroQUINE (PLAQUENIL) 200 mg tablet    zolpidem (AMBIEN) 10 mg Tab    mycophenolate (CELLCEPT) 500 mg Tab    mometasone 0.1% (ELOCON) 0.1 % cream    magnesium oxide (MAG-OX) 400 mg (241.3 mg magnesium) tablet    methocarbamoL (ROBAXIN) 500 MG Tab    Systemic lupus erythematosus, organ or system involvement unspecified    Relevant Medications    gabapentin (NEURONTIN) 300 MG capsule    hydrOXYchloroQUINE (PLAQUENIL) 200 mg tablet    zolpidem (AMBIEN) 10 mg Tab    mycophenolate (CELLCEPT) 500 mg Tab    mometasone 0.1% (ELOCON) 0.1 % cream    magnesium oxide (MAG-OX) 400 mg (241.3 mg magnesium) tablet    methocarbamoL (ROBAXIN) 500 MG Tab       Hematology    Lupus anticoagulant disorder    Relevant Medications    gabapentin (NEURONTIN) 300 MG capsule    hydrOXYchloroQUINE (PLAQUENIL) 200 mg tablet    zolpidem (AMBIEN) 10 mg Tab    mycophenolate (CELLCEPT) 500 mg Tab    mometasone 0.1% (ELOCON) 0.1 % cream    magnesium oxide (MAG-OX) 400 mg (241.3 mg magnesium) tablet    methocarbamoL (ROBAXIN) 500 MG Tab       GI    Gastroesophageal reflux disease    Relevant Medications    gabapentin (NEURONTIN) 300 MG capsule    hydrOXYchloroQUINE (PLAQUENIL) 200 mg tablet    zolpidem (AMBIEN) 10 mg Tab    mycophenolate (CELLCEPT) 500 mg Tab    mometasone 0.1% (ELOCON) 0.1 % cream    magnesium oxide (MAG-OX) 400 mg (241.3 mg magnesium) tablet    methocarbamoL (ROBAXIN) 500 MG Tab    Asplenia    Relevant Medications    gabapentin (NEURONTIN) 300 MG capsule    hydrOXYchloroQUINE (PLAQUENIL) 200 mg tablet    zolpidem (AMBIEN) 10 mg Tab    mycophenolate (CELLCEPT) 500 mg Tab    mometasone 0.1% (ELOCON) 0.1 % cream    magnesium oxide (MAG-OX) 400 mg (241.3 mg magnesium) tablet    methocarbamoL (ROBAXIN) 500 MG Tab

## 2023-05-11 ENCOUNTER — TELEPHONE (OUTPATIENT)
Dept: FAMILY MEDICINE | Facility: CLINIC | Age: 53
End: 2023-05-11
Payer: MEDICARE

## 2023-05-11 NOTE — TELEPHONE ENCOUNTER
Patient called stating she was prescribed Cellecpt by Dr. Jacobsen last week. After reading the side effects she said she became nervous to take it. She wants to know if Dr. Riddle would recommend she take this medication.

## 2023-05-15 ENCOUNTER — TELEPHONE (OUTPATIENT)
Dept: FAMILY MEDICINE | Facility: CLINIC | Age: 53
End: 2023-05-15
Payer: MEDICARE

## 2023-06-19 PROBLEM — Z00.00 MEDICARE ANNUAL WELLNESS VISIT, SUBSEQUENT: Status: RESOLVED | Noted: 2022-06-14 | Resolved: 2023-06-19

## 2023-07-20 ENCOUNTER — HOSPITAL ENCOUNTER (EMERGENCY)
Facility: HOSPITAL | Age: 53
Discharge: HOME | End: 2023-07-20
Attending: INTERNAL MEDICINE
Payer: MEDICARE

## 2023-07-20 VITALS
SYSTOLIC BLOOD PRESSURE: 130 MMHG | HEIGHT: 71 IN | TEMPERATURE: 98 F | WEIGHT: 237.13 LBS | DIASTOLIC BLOOD PRESSURE: 85 MMHG | BODY MASS INDEX: 33.2 KG/M2 | HEART RATE: 60 BPM | RESPIRATION RATE: 20 BRPM | OXYGEN SATURATION: 95 %

## 2023-07-20 DIAGNOSIS — K55.069 SUPERIOR MESENTERIC ARTERY THROMBOSIS: ICD-10-CM

## 2023-07-20 DIAGNOSIS — G89.4 CHRONIC PAIN SYNDROME: ICD-10-CM

## 2023-07-20 DIAGNOSIS — I73.9 PVD (PERIPHERAL VASCULAR DISEASE): ICD-10-CM

## 2023-07-20 DIAGNOSIS — I73.9 PERIPHERAL ARTERY DISEASE: ICD-10-CM

## 2023-07-20 DIAGNOSIS — R10.13 EPIGASTRIC ABDOMINAL PAIN: Primary | ICD-10-CM

## 2023-07-20 LAB
ALBUMIN SERPL-MCNC: 3.2 G/DL (ref 3.5–5)
ALBUMIN/GLOB SERPL: 0.9 RATIO (ref 1.1–2)
ALP SERPL-CCNC: 123 UNIT/L (ref 40–150)
ALT SERPL-CCNC: 24 UNIT/L (ref 0–55)
APPEARANCE UR: CLEAR
AST SERPL-CCNC: 27 UNIT/L (ref 5–34)
BACTERIA #/AREA URNS AUTO: ABNORMAL /HPF
BASOPHILS # BLD AUTO: 0.06 X10(3)/MCL
BASOPHILS NFR BLD AUTO: 0.5 %
BILIRUB UR QL STRIP.AUTO: NEGATIVE
BILIRUBIN DIRECT+TOT PNL SERPL-MCNC: 0.2 MG/DL
BUN SERPL-MCNC: 16.3 MG/DL (ref 9.8–20.1)
CALCIUM SERPL-MCNC: 9.4 MG/DL (ref 8.4–10.2)
CHLORIDE SERPL-SCNC: 108 MMOL/L (ref 98–107)
CO2 SERPL-SCNC: 22 MMOL/L (ref 22–29)
COLOR UR: YELLOW
CREAT SERPL-MCNC: 0.84 MG/DL (ref 0.55–1.02)
EOSINOPHIL # BLD AUTO: 0.28 X10(3)/MCL (ref 0–0.9)
EOSINOPHIL NFR BLD AUTO: 2.5 %
ERYTHROCYTE [DISTWIDTH] IN BLOOD BY AUTOMATED COUNT: 14.8 % (ref 11.5–17)
GFR SERPLBLD CREATININE-BSD FMLA CKD-EPI: >60 MLS/MIN/1.73/M2
GLOBULIN SER-MCNC: 3.4 GM/DL (ref 2.4–3.5)
GLUCOSE SERPL-MCNC: 113 MG/DL (ref 74–100)
GLUCOSE UR QL STRIP.AUTO: NORMAL
HCT VFR BLD AUTO: 43.5 % (ref 37–47)
HGB BLD-MCNC: 14.1 G/DL (ref 12–16)
HYALINE CASTS #/AREA URNS LPF: ABNORMAL /LPF
IMM GRANULOCYTES # BLD AUTO: 0.05 X10(3)/MCL (ref 0–0.04)
IMM GRANULOCYTES NFR BLD AUTO: 0.4 %
KETONES UR QL STRIP.AUTO: NEGATIVE
LEUKOCYTE ESTERASE UR QL STRIP.AUTO: NEGATIVE
LYMPHOCYTES # BLD AUTO: 2.31 X10(3)/MCL (ref 0.6–4.6)
LYMPHOCYTES NFR BLD AUTO: 20.7 %
MCH RBC QN AUTO: 30.1 PG (ref 27–31)
MCHC RBC AUTO-ENTMCNC: 32.4 G/DL (ref 33–36)
MCV RBC AUTO: 92.8 FL (ref 80–94)
MONOCYTES # BLD AUTO: 0.41 X10(3)/MCL (ref 0.1–1.3)
MONOCYTES NFR BLD AUTO: 3.7 %
MUCOUS THREADS URNS QL MICRO: ABNORMAL /LPF
NEUTROPHILS # BLD AUTO: 8.07 X10(3)/MCL (ref 2.1–9.2)
NEUTROPHILS NFR BLD AUTO: 72.2 %
NITRITE UR QL STRIP.AUTO: NEGATIVE
NRBC BLD AUTO-RTO: 0 %
PH UR STRIP.AUTO: 6 [PH]
PLATELET # BLD AUTO: 291 X10(3)/MCL (ref 130–400)
PMV BLD AUTO: 9.3 FL (ref 7.4–10.4)
POTASSIUM SERPL-SCNC: 4.1 MMOL/L (ref 3.5–5.1)
PROT SERPL-MCNC: 6.6 GM/DL (ref 6.4–8.3)
PROT UR QL STRIP.AUTO: ABNORMAL
RBC # BLD AUTO: 4.69 X10(6)/MCL (ref 4.2–5.4)
RBC #/AREA URNS AUTO: ABNORMAL /HPF
RBC UR QL AUTO: ABNORMAL
SODIUM SERPL-SCNC: 138 MMOL/L (ref 136–145)
SP GR UR STRIP.AUTO: 1.02
SQUAMOUS #/AREA URNS LPF: ABNORMAL /HPF
UROBILINOGEN UR STRIP-ACNC: NORMAL
WBC # SPEC AUTO: 11.18 X10(3)/MCL (ref 4.5–11.5)
WBC #/AREA URNS AUTO: ABNORMAL /HPF

## 2023-07-20 PROCEDURE — 96374 THER/PROPH/DIAG INJ IV PUSH: CPT | Mod: 59

## 2023-07-20 PROCEDURE — 96361 HYDRATE IV INFUSION ADD-ON: CPT

## 2023-07-20 PROCEDURE — 80053 COMPREHEN METABOLIC PANEL: CPT | Performed by: INTERNAL MEDICINE

## 2023-07-20 PROCEDURE — 25500020 PHARM REV CODE 255

## 2023-07-20 PROCEDURE — 99285 EMERGENCY DEPT VISIT HI MDM: CPT | Mod: 25

## 2023-07-20 PROCEDURE — 81001 URINALYSIS AUTO W/SCOPE: CPT | Performed by: INTERNAL MEDICINE

## 2023-07-20 PROCEDURE — 85025 COMPLETE CBC W/AUTO DIFF WBC: CPT | Performed by: INTERNAL MEDICINE

## 2023-07-20 PROCEDURE — 25000003 PHARM REV CODE 250: Performed by: INTERNAL MEDICINE

## 2023-07-20 RX ORDER — FAMOTIDINE 10 MG/ML
20 INJECTION INTRAVENOUS
Status: COMPLETED | OUTPATIENT
Start: 2023-07-20 | End: 2023-07-20

## 2023-07-20 RX ORDER — HYDROCODONE BITARTRATE AND ACETAMINOPHEN 7.5; 325 MG/1; MG/1
1 TABLET ORAL ONCE
Status: COMPLETED | OUTPATIENT
Start: 2023-07-20 | End: 2023-07-20

## 2023-07-20 RX ORDER — SODIUM CHLORIDE 9 MG/ML
INJECTION, SOLUTION INTRAVENOUS CONTINUOUS
Status: DISCONTINUED | OUTPATIENT
Start: 2023-07-20 | End: 2023-07-21 | Stop reason: HOSPADM

## 2023-07-20 RX ADMIN — FAMOTIDINE 20 MG: 10 INJECTION, SOLUTION INTRAVENOUS at 08:07

## 2023-07-20 RX ADMIN — HYDROCODONE BITARTRATE AND ACETAMINOPHEN 1 TABLET: 7.5; 325 TABLET ORAL at 08:07

## 2023-07-20 RX ADMIN — SODIUM CHLORIDE: 9 INJECTION, SOLUTION INTRAVENOUS at 08:07

## 2023-07-20 RX ADMIN — IOHEXOL 100 ML: 350 INJECTION, SOLUTION INTRAVENOUS at 08:07

## 2023-07-21 NOTE — ED PROVIDER NOTES
"Encounter Date: 7/20/2023       History     Chief Complaint   Patient presents with    Abdominal Pain     Pt c/o abdominal pain x 2weeks.  Reports increase in pain tonight while eating scrambled eggs tonight at  1800.  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 States pain worse after eating, feeling similar than when she had her arterial problems. Currently taking Xarelto. Also Hx of Lupus.    The history is provided by the patient.   Review of patient's allergies indicates:   Allergen Reactions    Heparin Anaphylaxis    Vancomycin      Other reaction(s): Blotchy, Hives    Opioids - morphine analogues Nausea And Vomiting     Past Medical History:   Diagnosis Date    Peripheral artery disease     Systemic lupus erythematosus, organ or system involvement unspecified      Past Surgical History:   Procedure Laterality Date    HYSTERECTOMY  07/01/2013     No family history on file.  Social History     Tobacco Use    Smoking status: Every Day     Types: Cigarettes    Smokeless tobacco: Never    Tobacco comments:     patient reports she smokes marijuana   Substance Use Topics    Alcohol use: Yes     Comment: socially    Drug use: Never     Review of Systems   Constitutional:  Negative for fever.   HENT:  Negative for sore throat.    Respiratory:  Negative for shortness of breath.    Cardiovascular:  Negative for chest pain.   Gastrointestinal:  Positive for abdominal pain. Negative for nausea.   Genitourinary:  Negative for dysuria.   Musculoskeletal:  Negative for back pain.   Skin:  Negative for rash.   Neurological:  Negative for weakness.   Hematological:  Does not bruise/bleed easily.   All other systems reviewed and are negative.    Physical Exam     Initial Vitals [07/20/23 1900]   BP Pulse Resp Temp SpO2   130/84 73 17 97.9 °F (36.6 °C) 96 %      MAP       --         Physical " Exam    Nursing note and vitals reviewed.  Constitutional: She appears well-developed and well-nourished. No distress.   HENT:   Head: Normocephalic and atraumatic.   Mouth/Throat: Oropharynx is clear and moist.   Eyes: Conjunctivae and EOM are normal. Pupils are equal, round, and reactive to light.   Neck: Neck supple. No JVD present.   Normal range of motion.  Cardiovascular:  Normal rate, regular rhythm, normal heart sounds and intact distal pulses.           Pulmonary/Chest: Breath sounds normal.   Abdominal: Abdomen is soft. Bowel sounds are normal. She exhibits no distension. There is abdominal tenderness (Mild, diffuse). There is no rebound and no guarding.   Musculoskeletal:         General: No edema. Normal range of motion.      Cervical back: Normal range of motion and neck supple.     Neurological: She is alert and oriented to person, place, and time. She has normal strength. GCS score is 15. GCS eye subscore is 4. GCS verbal subscore is 5. GCS motor subscore is 6.   Skin: Skin is warm and dry. Rash (Right facial, also diffuse hyperpigmented macules generalized) noted.   Psychiatric: Her behavior is normal.       ED Course   Procedures  Labs Reviewed   COMPREHENSIVE METABOLIC PANEL - Abnormal; Notable for the following components:       Result Value    Chloride 108 (*)     Glucose Level 113 (*)     Albumin Level 3.2 (*)     Albumin/Globulin Ratio 0.9 (*)     All other components within normal limits   URINALYSIS - Abnormal; Notable for the following components:    Protein, UA 3+ (*)     Blood, UA 3+ (*)     WBC, UA 6-10 (*)     Bacteria, UA Occ (*)     Squamous Epithelial Cells, UA Moderate (*)     Mucous, UA Trace (*)     RBC, UA 11-20 (*)     All other components within normal limits   CBC WITH DIFFERENTIAL - Abnormal; Notable for the following components:    MCHC 32.4 (*)     IG# 0.05 (*)     All other components within normal limits   CBC W/ AUTO DIFFERENTIAL    Narrative:     The following orders  were created for panel order CBC auto differential.  Procedure                               Abnormality         Status                     ---------                               -----------         ------                     CBC with Differential[[id number]]        Abnormal            Final result                 Please view results for these tests on the individual orders.   EXTRA TUBES    Narrative:     The following orders were created for panel order EXTRA TUBES.  Procedure                               Abnormality         Status                     ---------                               -----------         ------                     Light Blue Top Hold[[id number]]                              In process                 Red Top Hold[[id number]]                                     In process                 Gold Top Hold[[id number]]                                    In process                 Pink Top Hold[[id number]]                                    In process                   Please view results for these tests on the individual orders.   LIGHT BLUE TOP HOLD   RED TOP HOLD   GOLD TOP HOLD   PINK TOP HOLD          Imaging Results              CTA Abdomen and Pelvis (Preliminary result)  Result time 07/20/23 21:23:10      Preliminary result by Balta Mitchell Jr., MD (07/20/23 21:23:10)                   Narrative:    START OF REPORT:  TECHNIQUE: CT ANGIOGRAM ABDOMEN AND PELVIS WITH AND WITHOUT CONTRAST.    COMPARISON: NONE AVAILABLE.    CLINICAL HISTORY: ABDOMINAL PAIN (PT C/O ABDOMINAL PAIN X 2WEEKS. REPORTS INCREASE IN PAIN TONIGHT WHILE EATING SCRAMBLED EGGS TONIGHT AT 1800. PT REPORTS A STENT IN THE ARTERY THAT FEEDS MY INTESTINES.    DOSAGE INFORMATION: AUTOMATED EXPOSURE CONTROL WAS UTILIZED.    Findings:  Lines and Tubes: None.  Thorax:  Lungs: Mild focal patchy ground-glass opacities are seen in the right middle lobe (series 6, images 1-16) may be mild pneumonitis.  Pleura: No effusions or  thickening.  Heart: The heart size is within normal limits.  Abdomen:  Abdominal Wall: No abdominal wall pathology is seen.  Liver: The liver demonstrates a mild nodular contour suggesting cirrhosis.  Biliary System: No intrahepatic or extrahepatic biliary duct dilatation is seen.  Gallbladder: The gallbladder is not identified, may be surgically absent.  Pancreas: The pancreas appears unremarkable.  Spleen: The spleen appears surgically absent.  Adrenals: The adrenal glands appear unremarkable.  Kidneys: The kidneys appear unremarkable with no stones cysts masses or hydronephrosis.  Aorta: There is mild calcification of the abdominal aorta and its branches. There is occlusion of the superior mesenteric arterial stent graft (series 12, image 95). The distal branches of superior mesenteric artery appears patent likely through collateral circulation. The inferior mesenteric artery is not definitely identified, may be occluded. There is occlusion of the visualized right superficial femoral artery.  IVC: Unremarkable.  Bowel: No suggestion of bowel ischemia.  Esophagus: The visualized esophagus appears unremarkable.  Stomach: The stomach appears unremarkable.  Duodenum: Unremarkable appearing duodenum.  Small Bowel: The small bowel appears unremarkable.  Colon: Multiple diverticula are seen in the descending and sigmoid colon. No associated inflammatory stranding or pericolonic fluid is seen to suggest diverticulitis.  Appendix: The appendix is not identified but no inflammatory changes are seen in the right lower quadrant to suggest appendicitis.  Peritoneum: No intraperitoneal free air or ascites is seen.    Pelvis:  Bladder: The bladder is nondistended and cannot be definitively evaluated.  Female:  Uterus: The uterus is surgically absent.  Ovaries: No adnexal masses are seen.    Bony structures:  Dorsal Spine: The visualized dorsal spine appears unremarkable.  Bony Pelvis: The visualized bony structures of the  pelvis appear unremarkable.      Impression:  1. Mild focal patchy ground-glass opacities are seen in the right middle lobe (series 6, images 1-16) may be mild pneumonitis.  2. The liver demonstrates a mild nodular contour suggesting cirrhosis.  3. There is occlusion of the superior mesenteric arterial stent graft (series 12, image 95). The distal branches of superior mesenteric artery appears patent likely through collateral circulation. The inferior mesenteric artery is not definitely identified, may be occluded. There is occlusion of the visualized right superficial femoral artery.  4. No suggestion of bowel ischemia.  5. Details and other findings as discussed above.                                         Medications   0.9%  NaCl infusion ( Intravenous New Bag 7/20/23 2038)   HYDROcodone-acetaminophen 7.5-325 mg per tablet 1 tablet (1 tablet Oral Given 7/20/23 2038)   famotidine (PF) injection 20 mg (20 mg Intravenous Given 7/20/23 2037)   iohexoL (OMNIPAQUE 350) 350 mg iodine/mL injection (100 mLs Intravenous Given 7/20/23 2030)     Medical Decision Making:   Differential Diagnosis:   Appendicitis, Diverticulitis, Pancreatitis, Pyelonephritis, AAA, Dissection, MI, Gastric Ulcer, Peptic Ulcer, Urinary retention, among others      Clinical Tests:   Lab Tests: Ordered  Radiological Study: Ordered  ED Management:  CTA w/o changes as compared to prior on April, no signs of ischemia with chronic vascular occlusions and collateral perfusion.                        Clinical Impression:   Final diagnoses:  [R10.13] Epigastric abdominal pain (Primary)  [I73.9] PVD (peripheral vascular disease)  [G89.4] Chronic pain syndrome  [I73.9] Peripheral artery disease  [K55.069] Superior mesenteric artery thrombosis        ED Disposition Condition    Discharge Stable          ED Prescriptions    None       Follow-up Information       Follow up With Specialties Details Why Contact Info    Brianna Riddle MD Family Medicine In 2  weeks  [address]  [address]  [facility] [address]  [phone]      [facility] Emergency Medicine  If symptoms worsen [address] [facility] [id number] [phone]    [facility] Vascular Surgery Schedule an appointment as soon as possible for a visit in 2 weeks  [address] [facility] [id number] [phone]             Ciaran Hill MD  07/20/23 2230

## 2023-08-02 ENCOUNTER — OFFICE VISIT (OUTPATIENT)
Dept: FAMILY MEDICINE | Facility: CLINIC | Age: 53
End: 2023-08-02
Payer: MEDICARE

## 2023-08-02 VITALS
WEIGHT: 230.69 LBS | RESPIRATION RATE: 16 BRPM | HEIGHT: 71 IN | DIASTOLIC BLOOD PRESSURE: 80 MMHG | SYSTOLIC BLOOD PRESSURE: 122 MMHG | TEMPERATURE: 98 F | OXYGEN SATURATION: 96 % | BODY MASS INDEX: 32.3 KG/M2 | HEART RATE: 70 BPM

## 2023-08-02 DIAGNOSIS — D84.821 DRUG-INDUCED IMMUNODEFICIENCY: ICD-10-CM

## 2023-08-02 DIAGNOSIS — I73.9 PERIPHERAL VASCULAR DISEASE: ICD-10-CM

## 2023-08-02 DIAGNOSIS — Z79.899 DRUG-INDUCED IMMUNODEFICIENCY: ICD-10-CM

## 2023-08-02 DIAGNOSIS — I73.9 PERIPHERAL ARTERY DISEASE: ICD-10-CM

## 2023-08-02 DIAGNOSIS — E66.9 CLASS 1 OBESITY WITH SERIOUS COMORBIDITY AND BODY MASS INDEX (BMI) OF 32.0 TO 32.9 IN ADULT, UNSPECIFIED OBESITY TYPE: ICD-10-CM

## 2023-08-02 DIAGNOSIS — F17.210 CIGARETTE SMOKER: Primary | ICD-10-CM

## 2023-08-02 DIAGNOSIS — R11.0 NAUSEA: ICD-10-CM

## 2023-08-02 DIAGNOSIS — M32.19 LUPUS VASCULITIS: ICD-10-CM

## 2023-08-02 DIAGNOSIS — I77.89 LUPUS VASCULITIS: ICD-10-CM

## 2023-08-02 PROBLEM — J40 BRONCHITIS: Status: RESOLVED | Noted: 2022-12-28 | Resolved: 2023-08-02

## 2023-08-02 PROCEDURE — 1159F PR MEDICATION LIST DOCUMENTED IN MEDICAL RECORD: ICD-10-PCS | Mod: CPTII,,, | Performed by: FAMILY MEDICINE

## 2023-08-02 PROCEDURE — 99214 PR OFFICE/OUTPT VISIT, EST, LEVL IV, 30-39 MIN: ICD-10-PCS | Mod: ,,, | Performed by: FAMILY MEDICINE

## 2023-08-02 PROCEDURE — 1159F MED LIST DOCD IN RCRD: CPT | Mod: CPTII,,, | Performed by: FAMILY MEDICINE

## 2023-08-02 PROCEDURE — 1160F PR REVIEW ALL MEDS BY PRESCRIBER/CLIN PHARMACIST DOCUMENTED: ICD-10-PCS | Mod: CPTII,,, | Performed by: FAMILY MEDICINE

## 2023-08-02 PROCEDURE — 3008F BODY MASS INDEX DOCD: CPT | Mod: CPTII,,, | Performed by: FAMILY MEDICINE

## 2023-08-02 PROCEDURE — 3074F PR MOST RECENT SYSTOLIC BLOOD PRESSURE < 130 MM HG: ICD-10-PCS | Mod: CPTII,,, | Performed by: FAMILY MEDICINE

## 2023-08-02 PROCEDURE — 3044F PR MOST RECENT HEMOGLOBIN A1C LEVEL <7.0%: ICD-10-PCS | Mod: CPTII,,, | Performed by: FAMILY MEDICINE

## 2023-08-02 PROCEDURE — 3079F PR MOST RECENT DIASTOLIC BLOOD PRESSURE 80-89 MM HG: ICD-10-PCS | Mod: CPTII,,, | Performed by: FAMILY MEDICINE

## 2023-08-02 PROCEDURE — 3079F DIAST BP 80-89 MM HG: CPT | Mod: CPTII,,, | Performed by: FAMILY MEDICINE

## 2023-08-02 PROCEDURE — 3066F PR DOCUMENTATION OF TREATMENT FOR NEPHROPATHY: ICD-10-PCS | Mod: CPTII,,, | Performed by: FAMILY MEDICINE

## 2023-08-02 PROCEDURE — 3008F PR BODY MASS INDEX (BMI) DOCUMENTED: ICD-10-PCS | Mod: CPTII,,, | Performed by: FAMILY MEDICINE

## 2023-08-02 PROCEDURE — 1160F RVW MEDS BY RX/DR IN RCRD: CPT | Mod: CPTII,,, | Performed by: FAMILY MEDICINE

## 2023-08-02 PROCEDURE — 3074F SYST BP LT 130 MM HG: CPT | Mod: CPTII,,, | Performed by: FAMILY MEDICINE

## 2023-08-02 PROCEDURE — 3066F NEPHROPATHY DOC TX: CPT | Mod: CPTII,,, | Performed by: FAMILY MEDICINE

## 2023-08-02 PROCEDURE — 99214 OFFICE O/P EST MOD 30 MIN: CPT | Mod: ,,, | Performed by: FAMILY MEDICINE

## 2023-08-02 PROCEDURE — 3062F POS MACROALBUMINURIA REV: CPT | Mod: CPTII,,, | Performed by: FAMILY MEDICINE

## 2023-08-02 PROCEDURE — 3062F PR POS MACROALBUMINURIA RESULT DOCUMENTED/REVIEW: ICD-10-PCS | Mod: CPTII,,, | Performed by: FAMILY MEDICINE

## 2023-08-02 PROCEDURE — 3044F HG A1C LEVEL LT 7.0%: CPT | Mod: CPTII,,, | Performed by: FAMILY MEDICINE

## 2023-08-02 RX ORDER — ONDANSETRON 8 MG/1
8 TABLET, ORALLY DISINTEGRATING ORAL 2 TIMES DAILY
Qty: 30 TABLET | Refills: 1 | Status: SHIPPED | OUTPATIENT
Start: 2023-08-02 | End: 2023-10-19 | Stop reason: SDUPTHER

## 2023-08-02 NOTE — ASSESSMENT & PLAN NOTE
Encouraged cessation. Referral placed. Discussed how smoking negatively affects her circulatory issues

## 2023-08-02 NOTE — PROGRESS NOTES
Subjective:        Patient ID: Vanesa Ricks is a 53 y.o. female.    Chief Complaint: Follow-up (Epigastric pain/Recent ED visit for this 7/20/23)      Patient presents to the clinic unaccompanied with complaint. She is due for her wellness visit in march    She was having symptoms so saw dr. Espinoza around 6/2023 for similar symptoms.  She went to ER on 7/20/23 for epigastric abd pain worse after eating. 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  She was discharged home with no meds. C/o nausea. Asking for meds.  She would like to see new vascular as dr. Espinoza told her he was retiring at her lov.  Was referred at ER visit but has not heard from anyone.  We will place referral again. She is still smoking.          She has lupus vasculitis, PAD/PVD, CHF, HLD.  She has seen Dr. Espinoza for mesenteric stenting in 8/2019.  Had had 4 fem pops (2 on each leg).  Her cardiologist is Dr. Villa. She had partial left great toe amputation and right great and second toes amputated. She has neuropathy and is also on gabapentin.      She has chronic pain: seeing pain mgmt in [place]- Dr. Stockton.  She is on norco 7.5mg from his office.  May be establishing with new pain mgmt as it is difficult for her to get to [place] and they may ask her to start doing injections instead of just prescribing pain meds.      She is mobility impaired.  has handicap tag.  Sometimes uses cane or walker. Cannot walk more than 200 ft without stopping to rest.      She has RA, lupus (dx at age 14), lupus vasculitis.  She was seeing Dr. Smith, rheumatology.  Her lov was 11/29/21.  She is on plaquenil, benlysta, olumiant, xarelto and prednisone.  She has not had follow up since Dr. Smith has moved. She is now seeing dr. Jacobsen. Dr. Smith referred her to [facility] and she saw dr. Cabrales on 2/21/22.  He advised to continue lifelong xarelto and follow up in 6 months     She has diabetes likely due  "to long term steroid use. Is diet controlled. Eye exams with dr. Hampton. urine micro 3/2023. Foot exam 5/2022. A1c 5.6 8/2022     She has gerd and was on protonix.  Not taking.      On zoloft for her mood     She is obese.      Has hypothyroid and is on synthroid.  Has not been taking for a few months.  Was giving headaches.      Mmg 9/2021. Ordered. She is postmenopausal.  Never done dexa.  Will order.  She has had hysterectomy and no longer does pap. Colonoscopy with dr. Lundy 9/2020     She is a smoker. 1 pack last 2.5 weeks. Smoking cessation referral placed.  She is allergic to heparin and vancomycin.         Review of Systems   Constitutional: Negative.  Negative for fever.   HENT: Negative.     Respiratory: Negative.     Cardiovascular: Negative.    Gastrointestinal:  Positive for abdominal pain, nausea and vomiting. Negative for constipation and diarrhea.   Genitourinary: Negative.    Skin:  Positive for rash.         Review of patient's allergies indicates:   Allergen Reactions    Heparin Anaphylaxis    Vancomycin      Other reaction(s): Blotchy, Hives    Opioids - morphine analogues Nausea And Vomiting      Vitals:    08/02/23 1616   BP: 122/80   BP Location: Left arm   Pulse: 70   Resp: 16   Temp: 98.4 °F (36.9 °C)   TempSrc: Temporal   SpO2: 96%   Weight: 104.6 kg (230 lb 11.2 oz)   Height: 5' 11" (1.803 m)      Social History     Socioeconomic History    Marital status: Single   Tobacco Use    Smoking status: Every Day     Current packs/day: 0.00     Types: Cigarettes    Smokeless tobacco: Never    Tobacco comments:     patient reports she smokes marijuana   Substance and Sexual Activity    Alcohol use: Yes     Comment: socially    Drug use: Never    Sexual activity: Yes      History reviewed. No pertinent family history.       Objective:     Physical Exam  Vitals and nursing note reviewed.   Constitutional:       Appearance: Normal appearance. She is obese.   HENT:      Head: Normocephalic " and atraumatic.      Nose: Nose normal.      Mouth/Throat:      Mouth: Mucous membranes are moist.      Pharynx: Oropharynx is clear.   Eyes:      Extraocular Movements: Extraocular movements intact.   Cardiovascular:      Rate and Rhythm: Normal rate and regular rhythm.      Pulses: Normal pulses.      Heart sounds: Normal heart sounds.   Pulmonary:      Effort: Pulmonary effort is normal.      Breath sounds: Normal breath sounds.   Musculoskeletal:         General: Normal range of motion.      Cervical back: Normal range of motion.   Skin:     General: Skin is warm and dry.   Neurological:      General: No focal deficit present.      Mental Status: She is alert and oriented to person, place, and time. Mental status is at baseline.   Psychiatric:         Mood and Affect: Mood normal.     Current Outpatient Medications on File Prior to Visit   Medication Sig Dispense Refill    albuterol (PROAIR HFA) 90 mcg/actuation inhaler Inhale 2 puffs into the lungs every 6 (six) hours as needed for Wheezing or Shortness of Breath. Rescue 18 g 0    belimumab (BENLYSTA) 200 mg/mL Syrg Inject 1 mL (200 mg total) into the skin once a week. 4 each 11    cetirizine (ZYRTEC) 10 MG tablet Take 1 tablet (10 mg total) by mouth once daily. 30 tablet 0    gabapentin (NEURONTIN) 300 MG capsule Take 1 capsule (300 mg total) by mouth 2 (two) times daily. 180 capsule 3    HYDROcodone-acetaminophen (NORCO) 7.5-325 mg per tablet Take 1 tablet by mouth every 8 (eight) hours as needed.      hydrOXYchloroQUINE (PLAQUENIL) 200 mg tablet Take 1 tablet (200 mg total) by mouth 2 (two) times daily. 180 tablet 3    magnesium oxide (MAG-OX) 400 mg (241.3 mg magnesium) tablet Take 1 tablet (400 mg total) by mouth nightly. 90 tablet 3    methocarbamoL (ROBAXIN) 500 MG Tab Take 1 tablet (500 mg total) by mouth 2 (two) times a day. 180 tablet 3    mometasone 0.1% (ELOCON) 0.1 % cream Apply topically every evening. 135 g 3    mycophenolate (CELLCEPT)  500 mg Tab Take 2 tablets (1,000 mg total) by mouth daily with dinner or evening meal. 180 tablet 3    naloxone (NARCAN) 4 mg/actuation Spry SMARTSIG:Both Nares      prasugreL (EFFIENT) 10 mg Tab Take 1 tablet (10 mg total) by mouth once daily. 90 tablet 3    rivaroxaban (XARELTO) 10 mg Tab Take 1 tablet (10 mg total) by mouth daily with dinner or evening meal. 90 tablet 3    sertraline (ZOLOFT) 100 MG tablet Take 1 tablet (100 mg total) by mouth once daily. 30 tablet 11    zolpidem (AMBIEN) 10 mg Tab Take 1 tablet (10 mg total) by mouth every evening. 90 tablet 3    [DISCONTINUED] mupirocin (BACTROBAN) 2 % ointment Apply topically 3 (three) times daily. (Patient not taking: Reported on 4/27/2023) 30 g 3     No current facility-administered medications on file prior to visit.     Health Maintenance   Topic Date Due    TETANUS VACCINE  12/28/2023 (Originally 3/18/1988)    Hemoglobin A1c  09/30/2023    Eye Exam  03/08/2024    Foot Exam  03/20/2024    Lipid Panel  03/31/2024    Mammogram  04/10/2024    Hepatitis C Screening  Completed      Results for orders placed or performed during the hospital encounter of 07/20/23   Comprehensive metabolic panel   Result Value Ref Range    Sodium Level 138 136 - 145 mmol/L    Potassium Level 4.1 3.5 - 5.1 mmol/L    Chloride 108 (H) 98 - 107 mmol/L    Carbon Dioxide 22 22 - 29 mmol/L    Glucose Level 113 (H) 74 - 100 mg/dL    Blood Urea Nitrogen 16.3 9.8 - 20.1 mg/dL    Creatinine 0.84 0.55 - 1.02 mg/dL    Calcium Level Total 9.4 8.4 - 10.2 mg/dL    Protein Total 6.6 6.4 - 8.3 gm/dL    Albumin Level 3.2 (L) 3.5 - 5.0 g/dL    Globulin 3.4 2.4 - 3.5 gm/dL    Albumin/Globulin Ratio 0.9 (L) 1.1 - 2.0 ratio    Bilirubin Total 0.2 <=1.5 mg/dL    Alkaline Phosphatase 123 40 - 150 unit/L    Alanine Aminotransferase 24 0 - 55 unit/L    Aspartate Aminotransferase 27 5 - 34 unit/L    eGFR >60 mls/min/1.73/m2   Urinalysis Clean Catch   Result Value Ref Range    Color, UA Yellow  Yellow, Light-Yellow, Dark Yellow, Lizz, Straw    Appearance, UA Clear Clear    Specific Gravity, UA 1.023     pH, UA 6.0 5.0 - 8.5    Protein, UA 3+ (A) Negative    Glucose, UA Normal Negative, Normal    Ketones, UA Negative Negative    Blood, UA 3+ (A) Negative    Bilirubin, UA Negative Negative    Urobilinogen, UA Normal 0.2, 1.0, Normal    Nitrites, UA Negative Negative    Leukocyte Esterase, UA Negative Negative    WBC, UA 6-10 (A) None Seen, 0-2, 3-5, 0-5 /HPF    Bacteria, UA Occ (A) None Seen /HPF    Squamous Epithelial Cells, UA Moderate (A) None Seen /HPF    Mucous, UA Trace (A) None Seen /LPF    Hyaline Casts, UA None Seen None Seen /lpf    RBC, UA 11-20 (A) None Seen, 0-2, 3-5, 0-5 /HPF   CBC with Differential   Result Value Ref Range    WBC 11.18 4.50 - 11.50 x10(3)/mcL    RBC 4.69 4.20 - 5.40 x10(6)/mcL    Hgb 14.1 12.0 - 16.0 g/dL    Hct 43.5 37.0 - 47.0 %    MCV 92.8 80.0 - 94.0 fL    MCH 30.1 27.0 - 31.0 pg    MCHC 32.4 (L) 33.0 - 36.0 g/dL    RDW 14.8 11.5 - 17.0 %    Platelet 291 130 - 400 x10(3)/mcL    MPV 9.3 7.4 - 10.4 fL    Neut % 72.2 %    Lymph % 20.7 %    Mono % 3.7 %    Eos % 2.5 %    Basophil % 0.5 %    Lymph # 2.31 0.6 - 4.6 x10(3)/mcL    Neut # 8.07 2.1 - 9.2 x10(3)/mcL    Mono # 0.41 0.1 - 1.3 x10(3)/mcL    Eos # 0.28 0 - 0.9 x10(3)/mcL    Baso # 0.06 <=0.2 x10(3)/mcL    IG# 0.05 (H) 0 - 0.04 x10(3)/mcL    IG% 0.4 %    NRBC% 0.0 %          Assessment & Plan:     Active Problem List with Overview Notes    Diagnosis Date Noted    Nausea 08/02/2023    Drug-induced immunodeficiency 08/02/2023    Advanced care planning/counseling discussion 03/20/2023    Postmenopausal 03/20/2023    Systemic lupus erythematosus, organ or system involvement unspecified     Breast cancer screening by mammogram 12/28/2022    Arterial insufficiency of lower extremity-chronic 08/18/2022    Asplenia 06/14/2022    Cigarette smoker 06/14/2022    Class 1 obesity with serious comorbidity and body mass index  (BMI) of 32.0 to 32.9 in adult 06/14/2022    Skin sore 06/14/2022    Lupus anticoagulant disorder 05/05/2022    Seronegative rheumatoid arthritis 05/05/2022    Neuropathy 05/05/2022    Peripheral vascular disease 05/05/2022    Irritable mood 05/05/2022    Hepatic cirrhosis 05/05/2022    Hyperlipidemia associated with type 2 diabetes mellitus 05/05/2022    Hypothyroidism 05/05/2022    Gastroesophageal reflux disease 05/05/2022    Diabetes mellitus 05/05/2022    Congestive heart failure 05/05/2022    Lupus vasculitis 05/05/2022    Peripheral artery disease 05/05/2022    Impaired mobility 05/05/2022    Chronic pain 05/05/2022       1. Cigarette smoker  Assessment & Plan:  Encouraged cessation. Referral placed. Discussed how smoking negatively affects her circulatory issues      2. Peripheral artery disease  Assessment & Plan:   On blood thinners. Encouraged smoking cessation. Keep appointments with cards- dr. Villa. Was previously seeing dr. Espinoza.  She states he is retiring soon.  Would like to see new vascular.Will place referral to vascular    Orders:  -     Ambulatory referral/consult to Vascular Surgery; Future; Expected date: 08/09/2023    3. Peripheral vascular disease  Assessment & Plan:  See PAD A&P    Orders:  -     Ambulatory referral/consult to Vascular Surgery; Future; Expected date: 08/09/2023    4. Lupus vasculitis  Assessment & Plan:  Keep appts with rheum. Previously seeing dr. chavez at [facility]. Keep appts with dr. Jacobsen. Reports compliance with prescription meds    Orders:  -     Ambulatory referral/consult to Vascular Surgery; Future; Expected date: 08/09/2023    5. Nausea  Assessment & Plan:  zofran sent in as requested. Use sparingly    Orders:  -     ondansetron (ZOFRAN-ODT) 8 MG TbDL; Take 1 tablet (8 mg total) by mouth 2 (two) times daily. As needed for nausea  Dispense: 30 tablet; Refill: 1    6. Class 1 obesity with serious comorbidity and body mass index (BMI) of 32.0 to  32.9 in adult, unspecified obesity type  Assessment & Plan:  Encouraged lifestyle change      7. Drug-induced immunodeficiency  Assessment & Plan:  On cellcept and plaquenil per rheum.  Keep scheduled appts           Follow up for as scheduled or sooner prn.

## 2023-08-02 NOTE — ASSESSMENT & PLAN NOTE
Keep appts with rheum. Previously seeing dr. chavez at [facility]. Keep appts with dr. Jacobsen. Reports compliance with prescription meds

## 2023-08-02 NOTE — ASSESSMENT & PLAN NOTE
On blood thinners. Encouraged smoking cessation. Keep appointments with cards- dr. Villa. Was previously seeing dr. Espinoza.  She states he is retiring soon.  Would like to see new vascular.Will place referral to vascular

## 2023-08-11 ENCOUNTER — HOSPITAL ENCOUNTER (EMERGENCY)
Facility: HOSPITAL | Age: 53
Discharge: HOME | End: 2023-08-11
Attending: EMERGENCY MEDICINE
Payer: MEDICARE

## 2023-08-11 VITALS
HEIGHT: 71 IN | BODY MASS INDEX: 30.8 KG/M2 | WEIGHT: 220 LBS | HEART RATE: 89 BPM | DIASTOLIC BLOOD PRESSURE: 85 MMHG | OXYGEN SATURATION: 98 % | SYSTOLIC BLOOD PRESSURE: 128 MMHG | RESPIRATION RATE: 18 BRPM | TEMPERATURE: 99 F

## 2023-08-11 DIAGNOSIS — T78.40XA ALLERGIC REACTION, INITIAL ENCOUNTER: ICD-10-CM

## 2023-08-11 DIAGNOSIS — R21 RASH: Primary | ICD-10-CM

## 2023-08-11 PROCEDURE — 96372 THER/PROPH/DIAG INJ SC/IM: CPT | Performed by: PHYSICIAN ASSISTANT

## 2023-08-11 PROCEDURE — 99284 EMERGENCY DEPT VISIT MOD MDM: CPT

## 2023-08-11 PROCEDURE — 63600175 PHARM REV CODE 636 W HCPCS: Performed by: PHYSICIAN ASSISTANT

## 2023-08-11 RX ORDER — DEXAMETHASONE SODIUM PHOSPHATE 4 MG/ML
4 INJECTION, SOLUTION INTRA-ARTICULAR; INTRALESIONAL; INTRAMUSCULAR; INTRAVENOUS; SOFT TISSUE
Status: COMPLETED | OUTPATIENT
Start: 2023-08-11 | End: 2023-08-11

## 2023-08-11 RX ORDER — TRIAMCINOLONE ACETONIDE 1 MG/G
OINTMENT TOPICAL 2 TIMES DAILY
Qty: 30 G | Refills: 0 | Status: SHIPPED | OUTPATIENT
Start: 2023-08-11

## 2023-08-11 RX ADMIN — DEXAMETHASONE SODIUM PHOSPHATE 4 MG: 4 INJECTION, SOLUTION INTRA-ARTICULAR; INTRALESIONAL; INTRAMUSCULAR; INTRAVENOUS; SOFT TISSUE at 04:08

## 2023-08-11 NOTE — ED PROVIDER NOTES
Encounter Date: 8/11/2023       History     Chief Complaint   Patient presents with    Rash     Pt concerned that rash to right side of face has worsened with swelling to eye this am, and blisters to hands. Pt relates the uses otc benadryl and cortisone creme without relief and has hx of lupus     Patient presents with: Rash: Pt concerned that rash to right side of face has worsened with swelling to eye this am.  Patient noticed that it has been swollen since she is been putting hydrocortisone on her face.  and blisters to hands. Pt relates the uses otc benadryl and cortisone creme without relief and has hx of lupus.           Rash   This is a new problem. The problem is associated with nothing. The rash is present on the face, right hand and right arm. The pain is at a severity of 0/10. The pain has been Fluctuating since onset. Associated symptoms include itching. She has tried antihistamines for the symptoms. The treatment provided no relief.     Review of patient's allergies indicates:   Allergen Reactions    Heparin Anaphylaxis    Vancomycin      Other reaction(s): Blotchy, Hives    Opioids - morphine analogues Nausea And Vomiting     Past Medical History:   Diagnosis Date    Peripheral artery disease     Systemic lupus erythematosus, organ or system involvement unspecified      Past Surgical History:   Procedure Laterality Date    HYSTERECTOMY  07/01/2013     No family history on file.  Social History     Tobacco Use    Smoking status: Every Day     Current packs/day: 0.00     Types: Cigarettes    Smokeless tobacco: Never    Tobacco comments:     patient reports she smokes marijuana   Substance Use Topics    Alcohol use: Yes     Comment: socially    Drug use: Never     Review of Systems   Constitutional:  Negative for fever.   HENT:  Negative for sore throat.    Respiratory:  Negative for shortness of breath.    Cardiovascular:  Negative for chest pain.   Gastrointestinal:  Negative for nausea.    Genitourinary:  Negative for dysuria.   Musculoskeletal:  Negative for back pain.   Skin:  Positive for itching and rash.   Neurological:  Negative for weakness.   Hematological:  Does not bruise/bleed easily.       Physical Exam     Initial Vitals [08/11/23 1546]   BP Pulse Resp Temp SpO2   128/85 89 18 98.5 °F (36.9 °C) 98 %      MAP       --         Physical Exam    Constitutional: She appears well-developed.   Cardiovascular:  Normal rate.           Pulmonary/Chest: Breath sounds normal.     Neurological: She is alert and oriented to person, place, and time. She has normal strength. GCS eye subscore is 4. GCS verbal subscore is 5. GCS motor subscore is 6.   Skin: Rash noted. No bruising and no laceration noted. Rash is urticarial.   Positive for an urticaria rash on her right side of her face.  Appearance of a some blistering what appears to be irritation of the skin from a steroidal ointment.  Positive for multiple blisters on her right hand and forearm.  No sign of edema,  erythema noted no swelling.  No discharge. m   Psychiatric: She has a normal mood and affect. Her behavior is normal. Judgment and thought content normal.         ED Course   Procedures  Labs Reviewed - No data to display       Imaging Results    None          Medications   dexAMETHasone injection 4 mg (4 mg Intramuscular Given 8/11/23 1623)     Medical Decision Making:   Initial Assessment:   53-year-old female comes in with complaints of right-sided facial swelling with blistering appear rash and several blisters on her right hand.   Differential Diagnosis:   Judging by the patient's chief complaint and pertinent history, the patient has the following possible differential diagnoses, including but not limited to the following.  Some of these are deemed to be lower likelihood and some more likely based on my physical exam and history combined with possible lab work and/or imaging studies.   Please see the pertinent studies, and refer to  the HPI.  Some of these diagnoses will take further evaluation to fully rule out, perhaps as an outpatient and the patient was encouraged to follow up when discharged for more comprehensive evaluation.    Cellulitis, viral syndrome, herpes zoster (shingles), allergic reaction/anaphylaxis, contact dermatitis,   ED Management:  IM dexamethasone, advised patient to apply some cold compresses to  right side of her face.  Avoid corticosteroid ointment to her face at this point.  The patient is resting comfortably in no acute distress.  She is hemodynamically stable and is without objective evidence for acute process requiring urgent intervention or hospitalization. I provided counseling to patient with regard to condition, the treatment plan, specific conditions for return, and the importance of follow up. Detailed written and verbal instructions provided to patient and he expressed a verbal understanding of the discharge instructions and overall management plan. Reiterated the importance of medication administration and safety and advised patient to follow up with primary care provider in 3-5 days or sooner if needed.  Answered questions at this time. The patient is stable for discharge.                                Clinical Impression:   Final diagnoses:  [R21] Rash (Primary)  [T78.40XA] Allergic reaction, initial encounter        ED Disposition Condition    Discharge Stable          ED Prescriptions       Medication Sig Dispense Start Date End Date Auth. Provider    triamcinolone acetonide 0.1% (KENALOG) 0.1 % ointment Apply topically 2 (two) times daily. 30 g 8/11/2023 -- Lucille Sagastume PA          Follow-up Information       Follow up With Specialties Details Why Contact Info    [facility] - Emergency Dept Emergency Medicine  If symptoms worsen [address]  [facility] [phone] [phone]             Lucille Sagastume PA  08/12/23 1211

## 2023-08-11 NOTE — ED TRIAGE NOTES
Pt concerned that rash to right side of face has worsened with swelling to eye this am, and blisters to hands. Pt relates the uses otc benadryl and cortisone creme without relief and has hx of lupus

## 2023-08-16 NOTE — PROGRESS NOTES
[facility] - Clinic Note  Gavino Jacobson MD      Patient Name: Vanesa Ricks                   : 1970      MRN: [id number]   Visit Date: 2023       History Present Illness     Reason for Visit: Claudication       Ms. Ricks presents to the clinic for evaluation of lower extremity pain and to become established with a new vascular surgeon due to the upcoming group home of her previous surgeon (Olga). She reports lower extremity claudication at 200 feet but can push herself to 0.5 block (right worse than left). She has bilateral lower extremity bypasses and states her right leg bypass is occluded. She states she also has an occluded abdominal stent. She notes nausea and vomiting everyday with pain after oral intake.    She is a current smoker of approximately 5 cigarettes per day. She is also exposed to heavy secondhand smoker. She is diabetic with a recent HgbA1c 5.9. She reports she has lupus causing her clotting issues. She reports she has had minimal back issues.      REVIEW OF SYSTEMS:  ROS  12 point review of systems conducted, negative except as stated in the history of present illness. See HPI for details.        Physical Exam      Visit Vitals  /73 (BP Location: Left arm)   Pulse 70   Wt 108 kg (238 lb 3.2 oz)   LMP  (LMP Unknown)   BMI 33.22 kg/m²         General: well-nourished, no acute distress, and healthy appearing, ambulating normally, alert, pleasant, conversant, and oriented  Neurologic: cranial nerves are grossly intact, no neurologic deficits  HEENT: grossly normal and no scleral icterus  Neck/Chest: normal , soft without lymphadenopathy, and palpable carotid pulses bilaterally  Respiratory: breathing easily and without respiratory distress  Abdomen: normal and soft  Cardiology: regular rate and rhythm    Upper Extremity Arterial Exam:   Right - radial is palpable  Left - radial is lightly palpable    Lower Extremity Arterial Exam:  Right -  posterior tibial is monophasic with doppler and dorsalis pedis is monophasic with doppler  Left - posterior tibial is monophasic with doppler and dorsalis pedis is absent    Musculoskeletal:   Upper Extremity: normal bilateral hand function, right hand is warm and left hand is warm  Lower Extremity: no edema present to bilateral lower extremities  Healed left below knee incision. Left partial great toe ampuation, Right great and second toe amputations.                  Assessment and Plan     Ms. Ricks is a 53 y.o. with lower extremity arterial disease who has short distance claudication and worsening abdominal pain. I reviewed the images from her most recent CTA aorta with runoff and more recent CTA abdomen/pelvis which demonstrates a patent left [id number] bypass with moderate stenosis of the common femoral artery, what appears to be a chronically occluded right [id number]-[id number] bypass, and an occluded superior mesenteric artery stent.     I would not recommend procedural intervention for her lower extremities at this point. She is a claudicant without critical limb ischemia.  Revascularization would be complex and should be reserved for evidence of critical limb ischemia.   Recommend smoking cessation and regular ambulation.    Her meseneteric arterial obstruction is distal and there isn't opportunity for bypass beyond the stent.   The proximal SMA is patent.  We discussed the detrimental effects of tobacco use on the vascular system and overall health with additional support to further her ability to collateralize. There was an emphasis on achieving complete cessation.     Recommend 6 weeks follow up to evaluate the progression of her abdominal symptoms. GI evaluation would be valuable if symptoms progress.       1. Atherosclerosis of native artery of right lower extremity with intermittent claudication    2. Atherosclerosis of native artery of left lower extremity with intermittent  claudication    3. History of femoropopliteal bypass    4. Generalized postprandial abdominal pain    5. Mesenteric artery thrombosis    6. Peripheral vascular disease  - Ambulatory referral/consult to Vascular Surgery    7. Lupus vasculitis  - Ambulatory referral/consult to Vascular Surgery    8. Peripheral artery disease  - Ambulatory referral/consult to Vascular Surgery           Imaging Obtained/Reviewed   CT imaging reviewed        Medical History     Past Medical History:   Diagnosis Date    CHF (congestive heart failure)     HLD (hyperlipidemia)     Peripheral artery disease     Rheumatoid arthritis     Systemic lupus erythematosus, organ or system involvement unspecified      Past Surgical History:   Procedure Laterality Date    ANGIOGRAM, CORONARY, WITH LEFT HEART CATHETERIZATION      APPENDECTOMY      CHOLECYSTECTOMY      FEMORAL BYPASS Bilateral     Surgeon: Olga    femoral to above the knee bypass Right 2016    PTFE    HYSTERECTOMY  07/01/2013    SPLENECTOMY      STENT, SUPERIOR MESENTERIC ARTERY      TOE AMPUTATION      patital on left and two on right     History reviewed. No pertinent family history.  Social History     Socioeconomic History    Marital status: Single   Tobacco Use    Smoking status: Former     Current packs/day: 0.00     Types: Cigarettes    Smokeless tobacco: Never    Tobacco comments:     patient reports she smokes marijuana   Substance and Sexual Activity    Alcohol use: Yes     Comment: socially    Drug use: Never    Sexual activity: Yes     Current Outpatient Medications   Medication Instructions    albuterol (PROAIR HFA) 90 mcg/actuation inhaler 2 puffs, Inhalation, Every 6 hours PRN, Rescue    BENLYSTA 200 mg, Subcutaneous, Weekly    cetirizine (ZYRTEC) 10 mg, Oral, Daily    gabapentin (NEURONTIN) 300 mg, Oral, 2 times daily    HYDROcodone-acetaminophen (NORCO) 7.5-325 mg per tablet 1 tablet, Oral, Every 8 hours PRN    hydrOXYchloroQUINE (PLAQUENIL) 200 mg, Oral, 2 times  daily    magnesium oxide (MAG-OX) 400 mg, Oral, Nightly    methocarbamoL (ROBAXIN) 500 mg, Oral, 2 times daily    mycophenolate (CELLCEPT) 1,000 mg, Oral, With dinner    naloxone (NARCAN) 4 mg/actuation Spry SMARTSIG:Both Nares    ondansetron (ZOFRAN-ODT) 8 mg, Oral, 2 times daily, As needed for nausea    prasugreL (EFFIENT) 10 mg, Oral, Daily    rivaroxaban (XARELTO) 10 mg, Oral, With dinner    sertraline (ZOLOFT) 100 mg, Oral, Daily    triamcinolone acetonide 0.1% (KENALOG) 0.1 % ointment Topical (Top), 2 times daily    zolpidem (AMBIEN) 10 mg, Oral, Nightly     Review of patient's allergies indicates:   Allergen Reactions    Heparin Anaphylaxis    Vancomycin      Other reaction(s): Carlosy, Hives    Opioids - morphine analogues Nausea And Vomiting       Patient Care Team:  Brianna Riddle MD as PCP - General (Family Medicine)  BRENT Lundy MD as Consulting Physician (Gastroenterology)  Tr Hampton MD as Consulting Physician (Ophthalmology)  Siddharth Villa MD as Consulting Physician (Cardiology)  Francisco Espinoza MD as Consulting Physician (Cardiothoracic Surgery)  Cy Stockton MD as Consulting Physician (Pain Medicine)  Monie Rosales MA as Care Coordinator        No follow-ups on file. In addition to their scheduled follow up, the patient has also been instructed to follow up on as needed basis.     Future Appointments   Date Time Provider Department Center   9/5/2023  9:30 AM Donell Flowers MD [facility]   9/18/2023 11:00 AM [facility] DEX[facility]   3[facility]21/2024  1:30 [facility]

## 2023-08-17 ENCOUNTER — OFFICE VISIT (OUTPATIENT)
Dept: VASCULAR SURGERY | Facility: CLINIC | Age: 53
End: 2023-08-17
Payer: MEDICARE

## 2023-08-17 VITALS
BODY MASS INDEX: 33.22 KG/M2 | DIASTOLIC BLOOD PRESSURE: 73 MMHG | WEIGHT: 238.19 LBS | HEART RATE: 70 BPM | SYSTOLIC BLOOD PRESSURE: 109 MMHG

## 2023-08-17 DIAGNOSIS — I77.89 LUPUS VASCULITIS: ICD-10-CM

## 2023-08-17 DIAGNOSIS — M32.19 LUPUS VASCULITIS: ICD-10-CM

## 2023-08-17 DIAGNOSIS — I73.9 PERIPHERAL VASCULAR DISEASE: ICD-10-CM

## 2023-08-17 DIAGNOSIS — I70.212 ATHEROSCLEROSIS OF NATIVE ARTERY OF LEFT LOWER EXTREMITY WITH INTERMITTENT CLAUDICATION: ICD-10-CM

## 2023-08-17 DIAGNOSIS — I73.9 PERIPHERAL ARTERY DISEASE: ICD-10-CM

## 2023-08-17 DIAGNOSIS — I70.211 ATHEROSCLEROSIS OF NATIVE ARTERY OF RIGHT LOWER EXTREMITY WITH INTERMITTENT CLAUDICATION: Primary | ICD-10-CM

## 2023-08-17 DIAGNOSIS — Z98.890 HISTORY OF FEMOROPOPLITEAL BYPASS: ICD-10-CM

## 2023-08-17 DIAGNOSIS — R10.84 GENERALIZED POSTPRANDIAL ABDOMINAL PAIN: ICD-10-CM

## 2023-08-17 DIAGNOSIS — K55.069 MESENTERIC ARTERY THROMBOSIS: ICD-10-CM

## 2023-08-17 PROCEDURE — 3078F DIAST BP <80 MM HG: CPT | Mod: CPTII,,, | Performed by: SPECIALIST

## 2023-08-17 PROCEDURE — 3044F PR MOST RECENT HEMOGLOBIN A1C LEVEL <7.0%: ICD-10-PCS | Mod: CPTII,,, | Performed by: SPECIALIST

## 2023-08-17 PROCEDURE — 3074F SYST BP LT 130 MM HG: CPT | Mod: CPTII,,, | Performed by: SPECIALIST

## 2023-08-17 PROCEDURE — 3078F PR MOST RECENT DIASTOLIC BLOOD PRESSURE < 80 MM HG: ICD-10-PCS | Mod: CPTII,,, | Performed by: SPECIALIST

## 2023-08-17 PROCEDURE — 99213 OFFICE O/P EST LOW 20 MIN: CPT | Mod: ,,, | Performed by: SPECIALIST

## 2023-08-17 PROCEDURE — 1160F PR REVIEW ALL MEDS BY PRESCRIBER/CLIN PHARMACIST DOCUMENTED: ICD-10-PCS | Mod: CPTII,,, | Performed by: SPECIALIST

## 2023-08-17 PROCEDURE — 1159F MED LIST DOCD IN RCRD: CPT | Mod: CPTII,,, | Performed by: SPECIALIST

## 2023-08-17 PROCEDURE — 3044F HG A1C LEVEL LT 7.0%: CPT | Mod: CPTII,,, | Performed by: SPECIALIST

## 2023-08-17 PROCEDURE — 99213 PR OFFICE/OUTPT VISIT, EST, LEVL III, 20-29 MIN: ICD-10-PCS | Mod: ,,, | Performed by: SPECIALIST

## 2023-08-17 PROCEDURE — 3066F PR DOCUMENTATION OF TREATMENT FOR NEPHROPATHY: ICD-10-PCS | Mod: CPTII,,, | Performed by: SPECIALIST

## 2023-08-17 PROCEDURE — 3008F PR BODY MASS INDEX (BMI) DOCUMENTED: ICD-10-PCS | Mod: CPTII,,, | Performed by: SPECIALIST

## 2023-08-17 PROCEDURE — 3062F POS MACROALBUMINURIA REV: CPT | Mod: CPTII,,, | Performed by: SPECIALIST

## 2023-08-17 PROCEDURE — 1160F RVW MEDS BY RX/DR IN RCRD: CPT | Mod: CPTII,,, | Performed by: SPECIALIST

## 2023-08-17 PROCEDURE — 1159F PR MEDICATION LIST DOCUMENTED IN MEDICAL RECORD: ICD-10-PCS | Mod: CPTII,,, | Performed by: SPECIALIST

## 2023-08-17 PROCEDURE — 3074F PR MOST RECENT SYSTOLIC BLOOD PRESSURE < 130 MM HG: ICD-10-PCS | Mod: CPTII,,, | Performed by: SPECIALIST

## 2023-08-17 PROCEDURE — 3066F NEPHROPATHY DOC TX: CPT | Mod: CPTII,,, | Performed by: SPECIALIST

## 2023-08-17 PROCEDURE — 3062F PR POS MACROALBUMINURIA RESULT DOCUMENTED/REVIEW: ICD-10-PCS | Mod: CPTII,,, | Performed by: SPECIALIST

## 2023-08-17 PROCEDURE — 3008F BODY MASS INDEX DOCD: CPT | Mod: CPTII,,, | Performed by: SPECIALIST

## 2023-09-05 ENCOUNTER — OFFICE VISIT (OUTPATIENT)
Dept: RHEUMATOLOGY | Facility: CLINIC | Age: 53
End: 2023-09-05
Payer: MEDICARE

## 2023-09-05 VITALS
WEIGHT: 233.63 LBS | TEMPERATURE: 98 F | OXYGEN SATURATION: 96 % | BODY MASS INDEX: 32.71 KG/M2 | SYSTOLIC BLOOD PRESSURE: 120 MMHG | HEART RATE: 67 BPM | HEIGHT: 71 IN | DIASTOLIC BLOOD PRESSURE: 81 MMHG

## 2023-09-05 DIAGNOSIS — G62.9 NEUROPATHY: ICD-10-CM

## 2023-09-05 DIAGNOSIS — Q89.01 ASPLENIA: ICD-10-CM

## 2023-09-05 DIAGNOSIS — M32.19 LUPUS VASCULITIS: ICD-10-CM

## 2023-09-05 DIAGNOSIS — K74.60 HEPATIC CIRRHOSIS, UNSPECIFIED HEPATIC CIRRHOSIS TYPE, UNSPECIFIED WHETHER ASCITES PRESENT: ICD-10-CM

## 2023-09-05 DIAGNOSIS — D68.62 LUPUS ANTICOAGULANT DISORDER: ICD-10-CM

## 2023-09-05 DIAGNOSIS — I77.89 LUPUS VASCULITIS: ICD-10-CM

## 2023-09-05 DIAGNOSIS — Z74.09 IMPAIRED MOBILITY: ICD-10-CM

## 2023-09-05 DIAGNOSIS — K21.9 GASTROESOPHAGEAL REFLUX DISEASE, UNSPECIFIED WHETHER ESOPHAGITIS PRESENT: ICD-10-CM

## 2023-09-05 DIAGNOSIS — M32.10 SYSTEMIC LUPUS ERYTHEMATOSUS, ORGAN OR SYSTEM INVOLVEMENT UNSPECIFIED: Primary | ICD-10-CM

## 2023-09-05 DIAGNOSIS — M06.00 SERONEGATIVE RHEUMATOID ARTHRITIS: ICD-10-CM

## 2023-09-05 PROCEDURE — 3044F PR MOST RECENT HEMOGLOBIN A1C LEVEL <7.0%: ICD-10-PCS | Mod: CPTII,S$GLB,, | Performed by: INTERNAL MEDICINE

## 2023-09-05 PROCEDURE — 3079F PR MOST RECENT DIASTOLIC BLOOD PRESSURE 80-89 MM HG: ICD-10-PCS | Mod: CPTII,S$GLB,, | Performed by: INTERNAL MEDICINE

## 2023-09-05 PROCEDURE — 1160F PR REVIEW ALL MEDS BY PRESCRIBER/CLIN PHARMACIST DOCUMENTED: ICD-10-PCS | Mod: CPTII,S$GLB,, | Performed by: INTERNAL MEDICINE

## 2023-09-05 PROCEDURE — 3066F PR DOCUMENTATION OF TREATMENT FOR NEPHROPATHY: ICD-10-PCS | Mod: CPTII,S$GLB,, | Performed by: INTERNAL MEDICINE

## 2023-09-05 PROCEDURE — 1160F RVW MEDS BY RX/DR IN RCRD: CPT | Mod: CPTII,S$GLB,, | Performed by: INTERNAL MEDICINE

## 2023-09-05 PROCEDURE — 3074F SYST BP LT 130 MM HG: CPT | Mod: CPTII,S$GLB,, | Performed by: INTERNAL MEDICINE

## 2023-09-05 PROCEDURE — 3008F BODY MASS INDEX DOCD: CPT | Mod: CPTII,S$GLB,, | Performed by: INTERNAL MEDICINE

## 2023-09-05 PROCEDURE — 3074F PR MOST RECENT SYSTOLIC BLOOD PRESSURE < 130 MM HG: ICD-10-PCS | Mod: CPTII,S$GLB,, | Performed by: INTERNAL MEDICINE

## 2023-09-05 PROCEDURE — 1159F MED LIST DOCD IN RCRD: CPT | Mod: CPTII,S$GLB,, | Performed by: INTERNAL MEDICINE

## 2023-09-05 PROCEDURE — 3062F POS MACROALBUMINURIA REV: CPT | Mod: CPTII,S$GLB,, | Performed by: INTERNAL MEDICINE

## 2023-09-05 PROCEDURE — 99214 OFFICE O/P EST MOD 30 MIN: CPT | Mod: S$GLB,,, | Performed by: INTERNAL MEDICINE

## 2023-09-05 PROCEDURE — 3008F PR BODY MASS INDEX (BMI) DOCUMENTED: ICD-10-PCS | Mod: CPTII,S$GLB,, | Performed by: INTERNAL MEDICINE

## 2023-09-05 PROCEDURE — 99999 PR PBB SHADOW E&M-EST. PATIENT-LVL III: ICD-10-PCS | Mod: PBBFAC,,, | Performed by: INTERNAL MEDICINE

## 2023-09-05 PROCEDURE — 99214 PR OFFICE/OUTPT VISIT, EST, LEVL IV, 30-39 MIN: ICD-10-PCS | Mod: S$GLB,,, | Performed by: INTERNAL MEDICINE

## 2023-09-05 PROCEDURE — 3079F DIAST BP 80-89 MM HG: CPT | Mod: CPTII,S$GLB,, | Performed by: INTERNAL MEDICINE

## 2023-09-05 PROCEDURE — 3066F NEPHROPATHY DOC TX: CPT | Mod: CPTII,S$GLB,, | Performed by: INTERNAL MEDICINE

## 2023-09-05 PROCEDURE — 99999 PR PBB SHADOW E&M-EST. PATIENT-LVL III: CPT | Mod: PBBFAC,,, | Performed by: INTERNAL MEDICINE

## 2023-09-05 PROCEDURE — 3062F PR POS MACROALBUMINURIA RESULT DOCUMENTED/REVIEW: ICD-10-PCS | Mod: CPTII,S$GLB,, | Performed by: INTERNAL MEDICINE

## 2023-09-05 PROCEDURE — 3044F HG A1C LEVEL LT 7.0%: CPT | Mod: CPTII,S$GLB,, | Performed by: INTERNAL MEDICINE

## 2023-09-05 PROCEDURE — 1159F PR MEDICATION LIST DOCUMENTED IN MEDICAL RECORD: ICD-10-PCS | Mod: CPTII,S$GLB,, | Performed by: INTERNAL MEDICINE

## 2023-09-05 RX ORDER — LANOLIN ALCOHOL/MO/W.PET/CERES
400 CREAM (GRAM) TOPICAL NIGHTLY
Qty: 90 TABLET | Refills: 3 | Status: SHIPPED | OUTPATIENT
Start: 2023-09-05

## 2023-09-05 RX ORDER — HYDROXYCHLOROQUINE SULFATE 200 MG/1
200 TABLET, FILM COATED ORAL 2 TIMES DAILY
Qty: 180 TABLET | Refills: 3 | Status: SHIPPED | OUTPATIENT
Start: 2023-09-05

## 2023-09-05 RX ORDER — MYCOPHENOLATE MOFETIL 500 MG/1
1000 TABLET ORAL
Qty: 180 TABLET | Refills: 3 | Status: SHIPPED | OUTPATIENT
Start: 2023-09-05 | End: 2024-09-04

## 2023-09-05 RX ORDER — HYDROCODONE BITARTRATE AND ACETAMINOPHEN 7.5; 325 MG/1; MG/1
1 TABLET ORAL EVERY 8 HOURS PRN
Qty: 90 TABLET | Refills: 0 | Status: SHIPPED | OUTPATIENT
Start: 2023-09-05 | End: 2023-10-03 | Stop reason: SDUPTHER

## 2023-09-05 RX ORDER — ZOLPIDEM TARTRATE 10 MG/1
10 TABLET ORAL NIGHTLY
Qty: 90 TABLET | Refills: 3 | Status: SHIPPED | OUTPATIENT
Start: 2023-09-05 | End: 2024-08-30

## 2023-09-05 RX ORDER — GABAPENTIN 300 MG/1
300 CAPSULE ORAL 2 TIMES DAILY
Qty: 180 CAPSULE | Refills: 3 | Status: SHIPPED | OUTPATIENT
Start: 2023-09-05 | End: 2023-11-02 | Stop reason: SDUPTHER

## 2023-09-05 RX ORDER — METHOCARBAMOL 500 MG/1
500 TABLET, FILM COATED ORAL 2 TIMES DAILY
Qty: 180 TABLET | Refills: 3 | Status: SHIPPED | OUTPATIENT
Start: 2023-09-05

## 2023-09-05 NOTE — PROGRESS NOTES
"Subjective:       Patient ID: Vanesa Ricks is a 53 y.o. female.    Chief Complaint: Follow-up (Follow up. Patient of pain in hands, knees and elbows. Pain 6/10.)    The patient is complaining of joint pain involving the MCP PIP wrist elbow shoulders hips knees and ankles bilaterally.  The pain is 4/10 in intensity dull in quality and continuous.  That is associated with a morning stiffness lasting for more than 60 minutes.  Is also having difficulty maintaining a good night of sleep.  This has been associated with myalgias.  Muscle aches are 4/10 in intensity dull in quality and continuous.  They are associated with fatigue.  No fever no chills no others.  Labs checked during this visit         Review of Systems   Constitutional:  Negative for appetite change, chills and fever.   HENT:  Negative for congestion, ear pain, mouth sores, nosebleeds and trouble swallowing.    Eyes:  Negative for photophobia and discharge.   Respiratory:  Negative for chest tightness and shortness of breath.    Cardiovascular:  Negative for chest pain.   Gastrointestinal:  Negative for abdominal pain and vomiting.   Endocrine: Negative.    Genitourinary:  Negative for hematuria.   Musculoskeletal:         As per HPI   Skin:  Positive for rash.   Neurological:  Negative for weakness.         Objective:   /81 (BP Location: Right arm, Patient Position: Sitting, BP Method: Large (Automatic))   Pulse 67   Temp 98.4 °F (36.9 °C) (Oral)   Ht 5' 11" (1.803 m)   Wt 106 kg (233 lb 9.6 oz)   LMP  (LMP Unknown)   SpO2 96%   BMI 32.58 kg/m²      Physical Exam   Constitutional: She is oriented to person, place, and time. She appears well-developed and well-nourished. No distress.   HENT:   Head: Normocephalic and atraumatic.   Right Ear: External ear normal.   Left Ear: External ear normal.   Eyes: Pupils are equal, round, and reactive to light.   Cardiovascular: Normal rate, regular rhythm and normal heart sounds. "   Pulmonary/Chest: Breath sounds normal.   Abdominal: Soft. There is no abdominal tenderness.   Musculoskeletal:      Right shoulder: Tenderness present.      Left shoulder: Tenderness present.      Right elbow: Tenderness present.      Left elbow: Tenderness present.      Right wrist: Tenderness present.      Left wrist: Tenderness present.      Cervical back: Neck supple.      Right hip: Tenderness present.      Left hip: Tenderness present.      Right knee: Tenderness present.      Left knee: Tenderness present.      Right ankle: Tenderness present.      Left ankle: Tenderness present.   Lymphadenopathy:     She has no cervical adenopathy.   Neurological: She is alert and oriented to person, place, and time. She displays normal reflexes. No cranial nerve deficit or sensory deficit. She exhibits normal muscle tone. Coordination normal.   Skin: Rash noted. No erythema.   Vitals reviewed.      Right Side Rheumatological Exam     The patient is tender to palpation of the shoulder, elbow, wrist, knee, 1st PIP, 1st MCP, 2nd PIP, 2nd MCP, 3rd PIP, 3rd MCP, 4th PIP, 4th MCP, 5th PIP, hip, ankle, 1st MTP, 2nd MTP, 3rd MTP, 4th MTP, 5th MTP, 1st toe IP, 2nd toe IP, 3rd toe IP, 4th toe IP and 5th toe IP    Left Side Rheumatological Exam     The patient is tender to palpation of the shoulder, elbow, wrist, knee, 1st PIP, 1st MCP, 2nd PIP, 2nd MCP, 3rd PIP, 3rd MCP, 4th PIP, 4th MCP, 5th PIP, 5th MCP, hip, ankle, 1st MTP, 2nd MTP, 3rd MTP, 4th MTP, 5th MTP, 1st toe IP, 2nd toe IP, 3rd toe IP, 4th toe IP and 5th toe IP.         Completed Fibromyalgia exam 18/18 tender points.  No data to display     Assessment:       1. Systemic lupus erythematosus, organ or system involvement unspecified    2. Lupus vasculitis    3. Seronegative rheumatoid arthritis    4. Lupus anticoagulant disorder    5. Hepatic cirrhosis, unspecified hepatic cirrhosis type, unspecified whether ascites present    6. Impaired mobility    7. Neuropathy    8.  Asplenia    9. Gastroesophageal reflux disease, unspecified whether esophagitis present          Medication List with Changes/Refills   Current Medications    ALBUTEROL (PROAIR HFA) 90 MCG/ACTUATION INHALER    Inhale 2 puffs into the lungs every 6 (six) hours as needed for Wheezing or Shortness of Breath. Rescue       Start Date: 12/28/2022End Date: --    BELIMUMAB (BENLYSTA) 200 MG/ML SYRG    Inject 1 mL (200 mg total) into the skin once a week.       Start Date: 5/5/2023  End Date: --    CETIRIZINE (ZYRTEC) 10 MG TABLET    Take 1 tablet (10 mg total) by mouth once daily.       Start Date: 10/11/2022End Date: 9/5/2023    NALOXONE (NARCAN) 4 MG/ACTUATION SPRY    SMARTSIG:Both Nares       Start Date: 9/23/2022 End Date: --    ONDANSETRON (ZOFRAN-ODT) 8 MG TBDL    Take 1 tablet (8 mg total) by mouth 2 (two) times daily. As needed for nausea       Start Date: 8/2/2023  End Date: --    PRASUGREL (EFFIENT) 10 MG TAB    Take 1 tablet (10 mg total) by mouth once daily.       Start Date: 4/25/2023 End Date: 4/24/2024    RIVAROXABAN (XARELTO) 10 MG TAB    Take 1 tablet (10 mg total) by mouth daily with dinner or evening meal.       Start Date: 3/20/2023 End Date: 3/19/2024    SERTRALINE (ZOLOFT) 100 MG TABLET    Take 1 tablet (100 mg total) by mouth once daily.       Start Date: 12/21/2022End Date: 12/16/2023    TRIAMCINOLONE ACETONIDE 0.1% (KENALOG) 0.1 % OINTMENT    Apply topically 2 (two) times daily.       Start Date: 8/11/2023 End Date: --   Changed and/or Refilled Medications    Modified Medication Previous Medication    GABAPENTIN (NEURONTIN) 300 MG CAPSULE gabapentin (NEURONTIN) 300 MG capsule       Take 1 capsule (300 mg total) by mouth 2 (two) times daily.    Take 1 capsule (300 mg total) by mouth 2 (two) times daily.       Start Date: 9/5/2023  End Date: --    Start Date: 5/5/2023  End Date: 9/5/2023    HYDROCODONE-ACETAMINOPHEN (NORCO) 7.5-325 MG PER TABLET HYDROcodone-acetaminophen (NORCO) 7.5-325 mg per tablet        Take 1 tablet by mouth every 8 (eight) hours as needed for Pain.    Take 1 tablet by mouth every 8 (eight) hours as needed.       Start Date: 9/5/2023  End Date: --    Start Date: 5/3/2022  End Date: 9/5/2023    HYDROXYCHLOROQUINE (PLAQUENIL) 200 MG TABLET hydrOXYchloroQUINE (PLAQUENIL) 200 mg tablet       Take 1 tablet (200 mg total) by mouth 2 (two) times daily.    Take 1 tablet (200 mg total) by mouth 2 (two) times daily.       Start Date: 9/5/2023  End Date: --    Start Date: 5/5/2023  End Date: 9/5/2023    MAGNESIUM OXIDE (MAG-OX) 400 MG (241.3 MG MAGNESIUM) TABLET magnesium oxide (MAG-OX) 400 mg (241.3 mg magnesium) tablet       Take 1 tablet (400 mg total) by mouth nightly.    Take 1 tablet (400 mg total) by mouth nightly.       Start Date: 9/5/2023  End Date: --    Start Date: 5/5/2023  End Date: 9/5/2023    METHOCARBAMOL (ROBAXIN) 500 MG TAB methocarbamoL (ROBAXIN) 500 MG Tab       Take 1 tablet (500 mg total) by mouth 2 (two) times a day.    Take 1 tablet (500 mg total) by mouth 2 (two) times a day.       Start Date: 9/5/2023  End Date: --    Start Date: 5/5/2023  End Date: 9/5/2023    MYCOPHENOLATE (CELLCEPT) 500 MG TAB mycophenolate (CELLCEPT) 500 mg Tab       Take 2 tablets (1,000 mg total) by mouth after dinner.    Take 2 tablets (1,000 mg total) by mouth daily with dinner or evening meal.       Start Date: 9/5/2023  End Date: 9/4/2024    Start Date: 5/5/2023  End Date: 9/5/2023    ZOLPIDEM (AMBIEN) 10 MG TAB zolpidem (AMBIEN) 10 mg Tab       Take 1 tablet (10 mg total) by mouth every evening.    Take 1 tablet (10 mg total) by mouth every evening.       Start Date: 9/5/2023  End Date: 8/30/2024    Start Date: 5/5/2023  End Date: 9/5/2023         Plan:         Problem List Items Addressed This Visit          Neuro    Neuropathy    Relevant Medications    gabapentin (NEURONTIN) 300 MG capsule    hydrOXYchloroQUINE (PLAQUENIL) 200 mg tablet    HYDROcodone-acetaminophen (NORCO) 7.5-325 mg per tablet     mycophenolate (CELLCEPT) 500 mg Tab    zolpidem (AMBIEN) 10 mg Tab    magnesium oxide (MAG-OX) 400 mg (241.3 mg magnesium) tablet    methocarbamoL (ROBAXIN) 500 MG Tab       Immunology/Multi System    Seronegative rheumatoid arthritis    Relevant Medications    gabapentin (NEURONTIN) 300 MG capsule    hydrOXYchloroQUINE (PLAQUENIL) 200 mg tablet    HYDROcodone-acetaminophen (NORCO) 7.5-325 mg per tablet    mycophenolate (CELLCEPT) 500 mg Tab    zolpidem (AMBIEN) 10 mg Tab    magnesium oxide (MAG-OX) 400 mg (241.3 mg magnesium) tablet    methocarbamoL (ROBAXIN) 500 MG Tab    Lupus vasculitis    Relevant Medications    gabapentin (NEURONTIN) 300 MG capsule    hydrOXYchloroQUINE (PLAQUENIL) 200 mg tablet    HYDROcodone-acetaminophen (NORCO) 7.5-325 mg per tablet    mycophenolate (CELLCEPT) 500 mg Tab    zolpidem (AMBIEN) 10 mg Tab    magnesium oxide (MAG-OX) 400 mg (241.3 mg magnesium) tablet    methocarbamoL (ROBAXIN) 500 MG Tab    Systemic lupus erythematosus, organ or system involvement unspecified - Primary    Relevant Medications    gabapentin (NEURONTIN) 300 MG capsule    hydrOXYchloroQUINE (PLAQUENIL) 200 mg tablet    HYDROcodone-acetaminophen (NORCO) 7.5-325 mg per tablet    mycophenolate (CELLCEPT) 500 mg Tab    zolpidem (AMBIEN) 10 mg Tab    magnesium oxide (MAG-OX) 400 mg (241.3 mg magnesium) tablet    methocarbamoL (ROBAXIN) 500 MG Tab       Hematology    Lupus anticoagulant disorder    Relevant Medications    gabapentin (NEURONTIN) 300 MG capsule    hydrOXYchloroQUINE (PLAQUENIL) 200 mg tablet    HYDROcodone-acetaminophen (NORCO) 7.5-325 mg per tablet    mycophenolate (CELLCEPT) 500 mg Tab    zolpidem (AMBIEN) 10 mg Tab    magnesium oxide (MAG-OX) 400 mg (241.3 mg magnesium) tablet    methocarbamoL (ROBAXIN) 500 MG Tab       GI    Hepatic cirrhosis    Relevant Medications    gabapentin (NEURONTIN) 300 MG capsule    hydrOXYchloroQUINE (PLAQUENIL) 200 mg tablet    HYDROcodone-acetaminophen (NORCO) 7.5-325  mg per tablet    mycophenolate (CELLCEPT) 500 mg Tab    zolpidem (AMBIEN) 10 mg Tab    magnesium oxide (MAG-OX) 400 mg (241.3 mg magnesium) tablet    methocarbamoL (ROBAXIN) 500 MG Tab    Gastroesophageal reflux disease    Relevant Medications    gabapentin (NEURONTIN) 300 MG capsule    hydrOXYchloroQUINE (PLAQUENIL) 200 mg tablet    HYDROcodone-acetaminophen (NORCO) 7.5-325 mg per tablet    mycophenolate (CELLCEPT) 500 mg Tab    zolpidem (AMBIEN) 10 mg Tab    magnesium oxide (MAG-OX) 400 mg (241.3 mg magnesium) tablet    methocarbamoL (ROBAXIN) 500 MG Tab    Asplenia    Relevant Medications    gabapentin (NEURONTIN) 300 MG capsule    hydrOXYchloroQUINE (PLAQUENIL) 200 mg tablet    HYDROcodone-acetaminophen (NORCO) 7.5-325 mg per tablet    mycophenolate (CELLCEPT) 500 mg Tab    zolpidem (AMBIEN) 10 mg Tab    magnesium oxide (MAG-OX) 400 mg (241.3 mg magnesium) tablet    methocarbamoL (ROBAXIN) 500 MG Tab       Other    Impaired mobility    Relevant Medications    gabapentin (NEURONTIN) 300 MG capsule    hydrOXYchloroQUINE (PLAQUENIL) 200 mg tablet    HYDROcodone-acetaminophen (NORCO) 7.5-325 mg per tablet    mycophenolate (CELLCEPT) 500 mg Tab    zolpidem (AMBIEN) 10 mg Tab    magnesium oxide (MAG-OX) 400 mg (241.3 mg magnesium) tablet    methocarbamoL (ROBAXIN) 500 MG Tab

## 2023-10-03 DIAGNOSIS — M32.19 LUPUS VASCULITIS: ICD-10-CM

## 2023-10-03 DIAGNOSIS — K74.60 HEPATIC CIRRHOSIS, UNSPECIFIED HEPATIC CIRRHOSIS TYPE, UNSPECIFIED WHETHER ASCITES PRESENT: ICD-10-CM

## 2023-10-03 DIAGNOSIS — Q89.01 ASPLENIA: ICD-10-CM

## 2023-10-03 DIAGNOSIS — D68.62 LUPUS ANTICOAGULANT DISORDER: ICD-10-CM

## 2023-10-03 DIAGNOSIS — Z74.09 IMPAIRED MOBILITY: ICD-10-CM

## 2023-10-03 DIAGNOSIS — M06.00 SERONEGATIVE RHEUMATOID ARTHRITIS: ICD-10-CM

## 2023-10-03 DIAGNOSIS — K21.9 GASTROESOPHAGEAL REFLUX DISEASE, UNSPECIFIED WHETHER ESOPHAGITIS PRESENT: ICD-10-CM

## 2023-10-03 DIAGNOSIS — M32.10 SYSTEMIC LUPUS ERYTHEMATOSUS, ORGAN OR SYSTEM INVOLVEMENT UNSPECIFIED: ICD-10-CM

## 2023-10-03 DIAGNOSIS — I77.89 LUPUS VASCULITIS: ICD-10-CM

## 2023-10-03 DIAGNOSIS — G62.9 NEUROPATHY: ICD-10-CM

## 2023-10-03 NOTE — TELEPHONE ENCOUNTER
----- Message from Andra Zimmer sent at 10/3/2023  2:14 PM CDT -----  Regarding: Medication refill  Patient called requesting a refill for [place]. [facility]

## 2023-10-04 RX ORDER — HYDROCODONE BITARTRATE AND ACETAMINOPHEN 7.5; 325 MG/1; MG/1
1 TABLET ORAL EVERY 8 HOURS PRN
Qty: 90 TABLET | Refills: 0 | Status: SHIPPED | OUTPATIENT
Start: 2023-10-04 | End: 2023-11-02 | Stop reason: SDUPTHER

## 2023-10-19 DIAGNOSIS — R11.0 NAUSEA: ICD-10-CM

## 2023-10-19 RX ORDER — ONDANSETRON 8 MG/1
8 TABLET, ORALLY DISINTEGRATING ORAL 2 TIMES DAILY
Qty: 30 TABLET | Refills: 1 | Status: SHIPPED | OUTPATIENT
Start: 2023-10-19 | End: 2024-03-12 | Stop reason: SDUPTHER

## 2023-10-19 NOTE — TELEPHONE ENCOUNTER
----- Message from Shaggy Jones sent at 10/19/2023 10:59 AM CDT -----  .Type:  Needs Medical Advice    Who Called: pt   Symptoms (please be specific):    How long has patient had these symptoms:    Pharmacy name and phone #:  [id number] - [place], [place] - [address]  Would the patient rather a call back or a response via [facility]? Call back   Best Call Back Number: [phone]  Additional Information: Pt is requesting a refill of the nausea medication that goes under the tongue

## 2023-10-24 DIAGNOSIS — M32.10 SYSTEMIC LUPUS ERYTHEMATOSUS, ORGAN OR SYSTEM INVOLVEMENT UNSPECIFIED: ICD-10-CM

## 2023-10-24 DIAGNOSIS — D68.62 LUPUS ANTICOAGULANT DISORDER: ICD-10-CM

## 2023-10-24 DIAGNOSIS — M32.9 LUPUS: ICD-10-CM

## 2023-10-24 DIAGNOSIS — G62.9 NEUROPATHY: ICD-10-CM

## 2023-10-24 DIAGNOSIS — M06.00 SERONEGATIVE RHEUMATOID ARTHRITIS: ICD-10-CM

## 2023-10-24 DIAGNOSIS — Q89.01 ASPLENIA: ICD-10-CM

## 2023-10-24 DIAGNOSIS — K21.9 GASTROESOPHAGEAL REFLUX DISEASE, UNSPECIFIED WHETHER ESOPHAGITIS PRESENT: ICD-10-CM

## 2023-10-24 DIAGNOSIS — I77.89 LUPUS VASCULITIS: ICD-10-CM

## 2023-10-24 DIAGNOSIS — M32.19 LUPUS VASCULITIS: ICD-10-CM

## 2023-10-24 RX ORDER — BELIMUMAB 200 MG/ML
SOLUTION SUBCUTANEOUS
Qty: 4 EACH | Refills: 11 | Status: SHIPPED | OUTPATIENT
Start: 2023-10-24

## 2023-11-02 DIAGNOSIS — Q89.01 ASPLENIA: ICD-10-CM

## 2023-11-02 DIAGNOSIS — D68.62 LUPUS ANTICOAGULANT DISORDER: ICD-10-CM

## 2023-11-02 DIAGNOSIS — M32.10 SYSTEMIC LUPUS ERYTHEMATOSUS, ORGAN OR SYSTEM INVOLVEMENT UNSPECIFIED: ICD-10-CM

## 2023-11-02 DIAGNOSIS — M32.19 LUPUS VASCULITIS: ICD-10-CM

## 2023-11-02 DIAGNOSIS — K21.9 GASTROESOPHAGEAL REFLUX DISEASE, UNSPECIFIED WHETHER ESOPHAGITIS PRESENT: ICD-10-CM

## 2023-11-02 DIAGNOSIS — G62.9 NEUROPATHY: ICD-10-CM

## 2023-11-02 DIAGNOSIS — M06.00 SERONEGATIVE RHEUMATOID ARTHRITIS: ICD-10-CM

## 2023-11-02 DIAGNOSIS — I77.89 LUPUS VASCULITIS: ICD-10-CM

## 2023-11-02 DIAGNOSIS — K74.60 HEPATIC CIRRHOSIS, UNSPECIFIED HEPATIC CIRRHOSIS TYPE, UNSPECIFIED WHETHER ASCITES PRESENT: ICD-10-CM

## 2023-11-02 DIAGNOSIS — Z74.09 IMPAIRED MOBILITY: ICD-10-CM

## 2023-11-02 RX ORDER — HYDROCODONE BITARTRATE AND ACETAMINOPHEN 7.5; 325 MG/1; MG/1
1 TABLET ORAL EVERY 8 HOURS PRN
Qty: 90 TABLET | Refills: 0 | Status: SHIPPED | OUTPATIENT
Start: 2023-11-02 | End: 2024-01-01

## 2023-11-02 RX ORDER — GABAPENTIN 300 MG/1
300 CAPSULE ORAL 2 TIMES DAILY
Qty: 180 CAPSULE | Refills: 3 | Status: SHIPPED | OUTPATIENT
Start: 2023-11-02

## 2023-11-15 ENCOUNTER — HOSPITAL ENCOUNTER (EMERGENCY)
Facility: HOSPITAL | Age: 53
Discharge: HOME | End: 2023-11-15
Attending: EMERGENCY MEDICINE
Payer: MEDICARE

## 2023-11-15 VITALS
WEIGHT: 210 LBS | TEMPERATURE: 98 F | SYSTOLIC BLOOD PRESSURE: 115 MMHG | DIASTOLIC BLOOD PRESSURE: 73 MMHG | HEART RATE: 61 BPM | OXYGEN SATURATION: 92 % | RESPIRATION RATE: 18 BRPM | BODY MASS INDEX: 29.29 KG/M2

## 2023-11-15 DIAGNOSIS — R11.10 VOMITING, UNSPECIFIED VOMITING TYPE, UNSPECIFIED WHETHER NAUSEA PRESENT: Primary | ICD-10-CM

## 2023-11-15 DIAGNOSIS — R19.7 DIARRHEA, UNSPECIFIED TYPE: ICD-10-CM

## 2023-11-15 LAB
ALBUMIN SERPL-MCNC: 3.5 G/DL (ref 3.5–5)
ALBUMIN/GLOB SERPL: 1.2 RATIO (ref 1.1–2)
ALP SERPL-CCNC: 128 UNIT/L (ref 40–150)
ALT SERPL-CCNC: 19 UNIT/L (ref 0–55)
APPEARANCE UR: ABNORMAL
AST SERPL-CCNC: 27 UNIT/L (ref 5–34)
BACTERIA #/AREA URNS AUTO: ABNORMAL /HPF
BASOPHILS # BLD AUTO: 0.04 X10(3)/MCL
BASOPHILS NFR BLD AUTO: 0.4 %
BILIRUB SERPL-MCNC: 0.6 MG/DL
BILIRUB UR QL STRIP.AUTO: NEGATIVE
BUN SERPL-MCNC: 8.9 MG/DL (ref 9.8–20.1)
CALCIUM SERPL-MCNC: 9 MG/DL (ref 8.4–10.2)
CHLORIDE SERPL-SCNC: 108 MMOL/L (ref 98–107)
CO2 SERPL-SCNC: 24 MMOL/L (ref 22–29)
COLOR UR AUTO: YELLOW
CREAT SERPL-MCNC: 0.8 MG/DL (ref 0.55–1.02)
EOSINOPHIL # BLD AUTO: 0.25 X10(3)/MCL (ref 0–0.9)
EOSINOPHIL NFR BLD AUTO: 2.6 %
ERYTHROCYTE [DISTWIDTH] IN BLOOD BY AUTOMATED COUNT: 14.3 % (ref 11.5–17)
GFR SERPLBLD CREATININE-BSD FMLA CKD-EPI: >60 MLS/MIN/1.73/M2
GLOBULIN SER-MCNC: 2.9 GM/DL (ref 2.4–3.5)
GLUCOSE SERPL-MCNC: 114 MG/DL (ref 74–100)
GLUCOSE UR QL STRIP.AUTO: NORMAL
HCT VFR BLD AUTO: 47 % (ref 37–47)
HGB BLD-MCNC: 15.6 G/DL (ref 12–16)
HYALINE CASTS #/AREA URNS LPF: ABNORMAL /LPF
IMM GRANULOCYTES # BLD AUTO: 0.06 X10(3)/MCL (ref 0–0.04)
IMM GRANULOCYTES NFR BLD AUTO: 0.6 %
KETONES UR QL STRIP.AUTO: ABNORMAL
LACTATE SERPL-SCNC: 1.2 MMOL/L (ref 0.5–2.2)
LEUKOCYTE ESTERASE UR QL STRIP.AUTO: NEGATIVE
LIPASE SERPL-CCNC: 4 U/L
LYMPHOCYTES # BLD AUTO: 1.61 X10(3)/MCL (ref 0.6–4.6)
LYMPHOCYTES NFR BLD AUTO: 16.6 %
MAGNESIUM SERPL-MCNC: 1.9 MG/DL (ref 1.6–2.6)
MCH RBC QN AUTO: 30.3 PG (ref 27–31)
MCHC RBC AUTO-ENTMCNC: 33.2 G/DL (ref 33–36)
MCV RBC AUTO: 91.3 FL (ref 80–94)
MONOCYTES # BLD AUTO: 0.41 X10(3)/MCL (ref 0.1–1.3)
MONOCYTES NFR BLD AUTO: 4.2 %
MUCOUS THREADS URNS QL MICRO: ABNORMAL /LPF
NEUTROPHILS # BLD AUTO: 7.35 X10(3)/MCL (ref 2.1–9.2)
NEUTROPHILS NFR BLD AUTO: 75.6 %
NITRITE UR QL STRIP.AUTO: ABNORMAL
NRBC BLD AUTO-RTO: 0 %
PH UR STRIP.AUTO: 6.5 [PH]
PLATELET # BLD AUTO: 237 X10(3)/MCL (ref 130–400)
PMV BLD AUTO: 9.4 FL (ref 7.4–10.4)
POTASSIUM SERPL-SCNC: 4.2 MMOL/L (ref 3.5–5.1)
PROT SERPL-MCNC: 6.4 GM/DL (ref 6.4–8.3)
PROT UR QL STRIP.AUTO: ABNORMAL
RBC # BLD AUTO: 5.15 X10(6)/MCL (ref 4.2–5.4)
RBC #/AREA URNS AUTO: ABNORMAL /HPF
RBC UR QL AUTO: ABNORMAL
SODIUM SERPL-SCNC: 140 MMOL/L (ref 136–145)
SP GR UR STRIP.AUTO: 1.02 (ref 1–1.03)
SQUAMOUS #/AREA URNS LPF: ABNORMAL /HPF
UROBILINOGEN UR STRIP-ACNC: NORMAL
WBC # SPEC AUTO: 9.72 X10(3)/MCL (ref 4.5–11.5)
WBC #/AREA URNS AUTO: ABNORMAL /HPF

## 2023-11-15 PROCEDURE — 85025 COMPLETE CBC W/AUTO DIFF WBC: CPT

## 2023-11-15 PROCEDURE — 99285 EMERGENCY DEPT VISIT HI MDM: CPT | Mod: 25

## 2023-11-15 PROCEDURE — 81001 URINALYSIS AUTO W/SCOPE: CPT

## 2023-11-15 PROCEDURE — 80053 COMPREHEN METABOLIC PANEL: CPT

## 2023-11-15 PROCEDURE — 96374 THER/PROPH/DIAG INJ IV PUSH: CPT | Mod: 59

## 2023-11-15 PROCEDURE — 25500020 PHARM REV CODE 255: Performed by: NURSE PRACTITIONER

## 2023-11-15 PROCEDURE — 83605 ASSAY OF LACTIC ACID: CPT

## 2023-11-15 PROCEDURE — 83690 ASSAY OF LIPASE: CPT

## 2023-11-15 PROCEDURE — 63600175 PHARM REV CODE 636 W HCPCS: Performed by: NURSE PRACTITIONER

## 2023-11-15 PROCEDURE — 83735 ASSAY OF MAGNESIUM: CPT

## 2023-11-15 PROCEDURE — 96375 TX/PRO/DX INJ NEW DRUG ADDON: CPT

## 2023-11-15 PROCEDURE — 96361 HYDRATE IV INFUSION ADD-ON: CPT

## 2023-11-15 RX ORDER — ONDANSETRON 2 MG/ML
4 INJECTION INTRAMUSCULAR; INTRAVENOUS
Status: COMPLETED | OUTPATIENT
Start: 2023-11-15 | End: 2023-11-15

## 2023-11-15 RX ORDER — PROMETHAZINE HYDROCHLORIDE 25 MG/1
25 TABLET ORAL EVERY 6 HOURS PRN
Qty: 15 TABLET | Refills: 0 | Status: SHIPPED | OUTPATIENT
Start: 2023-11-15

## 2023-11-15 RX ORDER — HYDROMORPHONE HYDROCHLORIDE 2 MG/ML
1 INJECTION, SOLUTION INTRAMUSCULAR; INTRAVENOUS; SUBCUTANEOUS
Status: COMPLETED | OUTPATIENT
Start: 2023-11-15 | End: 2023-11-15

## 2023-11-15 RX ADMIN — ONDANSETRON 4 MG: 2 INJECTION INTRAMUSCULAR; INTRAVENOUS at 09:11

## 2023-11-15 RX ADMIN — IOPAMIDOL 100 ML: 755 INJECTION, SOLUTION INTRAVENOUS at 09:11

## 2023-11-15 RX ADMIN — SODIUM CHLORIDE, POTASSIUM CHLORIDE, SODIUM LACTATE AND CALCIUM CHLORIDE 1000 ML: 600; 310; 30; 20 INJECTION, SOLUTION INTRAVENOUS at 09:11

## 2023-11-15 RX ADMIN — HYDROMORPHONE HYDROCHLORIDE 1 MG: 2 INJECTION INTRAMUSCULAR; INTRAVENOUS; SUBCUTANEOUS at 09:11

## 2023-11-16 ENCOUNTER — TELEPHONE (OUTPATIENT)
Dept: FAMILY MEDICINE | Facility: CLINIC | Age: 53
End: 2023-11-16
Payer: MEDICAID

## 2023-11-16 NOTE — TELEPHONE ENCOUNTER
Patient advised that Dr. Riddle and Chun are out of the office today, Dr. Riddle's schedule tomorrow is full. I advised patient to go to the [facility]. She did go to the ER and it didn't help. She stated she will take going to [facility] into consideration. Seth

## 2023-11-16 NOTE — ED PROVIDER NOTES
Encounter Date: 11/15/2023       History     Chief Complaint   Patient presents with    Nausea     Pt reports severe nausea/diarrhea x4 days. Last episode vomiting this AM, haven't eaten in 5 days. Has hx GI stent placement, has not seen GI specialist. Hx Lupus.      See MDM    The history is provided by the patient. No  was used.     Review of patient's allergies indicates:   Allergen Reactions    Heparin Anaphylaxis    Vancomycin      Other reaction(s): Blotchy, Hives    Opioids - morphine analogues Nausea And Vomiting     Past Medical History:   Diagnosis Date    CHF (congestive heart failure)     HLD (hyperlipidemia)     Peripheral artery disease     Rheumatoid arthritis     Systemic lupus erythematosus, organ or system involvement unspecified      Past Surgical History:   Procedure Laterality Date    ANGIOGRAM, CORONARY, WITH LEFT HEART CATHETERIZATION      APPENDECTOMY      CHOLECYSTECTOMY      FEMORAL BYPASS Bilateral     Surgeon: Olga    femoral to below knee bypass Right 2016    HYSTERECTOMY  07/01/2013    SPLENECTOMY      STENT, SUPERIOR MESENTERIC ARTERY      TOE AMPUTATION      patital on left and two on right     History reviewed. No pertinent family history.  Social History     Tobacco Use    Smoking status: Former     Types: Cigarettes    Smokeless tobacco: Never    Tobacco comments:     patient reports she smokes marijuana   Substance Use Topics    Alcohol use: Not Currently     Comment: socially    Drug use: Yes     Types: Marijuana     Review of Systems   Constitutional:  Negative for fever.   Respiratory:  Negative for cough and shortness of breath.    Cardiovascular:  Negative for chest pain.   Gastrointestinal:  Positive for abdominal pain, diarrhea, nausea and vomiting.   Genitourinary:  Negative for difficulty urinating and dysuria.   Musculoskeletal:  Negative for gait problem.   Skin:  Negative for color change.   Neurological:  Negative for dizziness, speech  difficulty and headaches.   Psychiatric/Behavioral:  Negative for hallucinations and suicidal ideas.    All other systems reviewed and are negative.      Physical Exam     Initial Vitals [11/15/23 1819]   BP Pulse Resp Temp SpO2   120/79 76 20 98.1 °F (36.7 °C) (!) 94 %      MAP       --         Physical Exam    Nursing note and vitals reviewed.  Constitutional: She appears well-developed and well-nourished.   HENT:   Head: Normocephalic.   Eyes: EOM are normal.   Neck:   Normal range of motion.  Cardiovascular:  Normal rate, regular rhythm, normal heart sounds and intact distal pulses.           Pulmonary/Chest: Breath sounds normal. No respiratory distress.   Abdominal: Abdomen is soft. Bowel sounds are normal. There is no abdominal tenderness.   Musculoskeletal:         General: Normal range of motion.      Cervical back: Normal range of motion.     Neurological: She is alert and oriented to person, place, and time. She has normal strength.   Skin: Skin is warm and dry.   Psychiatric: She has a normal mood and affect. Her behavior is normal. Judgment and thought content normal.         ED Course   Procedures  Labs Reviewed   COMPREHENSIVE METABOLIC PANEL - Abnormal; Notable for the following components:       Result Value    Chloride 108 (*)     Glucose Level 114 (*)     Blood Urea Nitrogen 8.9 (*)     All other components within normal limits   URINALYSIS, REFLEX TO URINE CULTURE - Abnormal; Notable for the following components:    Appearance, UA Turbid (*)     Protein, UA 3+ (*)     Ketones, UA Trace (*)     Blood, UA 3+ (*)     Nitrites, UA 2+ (*)     Bacteria, UA Few (*)     Squamous Epithelial Cells, UA Occasional (*)     Mucous, UA Few (*)     Hyaline Casts, UA 3-5 (*)     RBC, UA 11-20 (*)     All other components within normal limits   CBC WITH DIFFERENTIAL - Abnormal; Notable for the following components:    IG# 0.06 (*)     All other components within normal limits   LIPASE - Normal   MAGNESIUM - Normal    LACTIC ACID, PLASMA - Normal   CBC W/ AUTO DIFFERENTIAL    Narrative:     The following orders were created for panel order CBC Auto Differential.  Procedure                               Abnormality         Status                     ---------                               -----------         ------                     CBC with Differential[[id number]]        Abnormal            Final result                 Please view results for these tests on the individual orders.          Imaging Results              CT Abdomen Pelvis With IV Contrast (Final result)  Result time 11/15/23 21:54:07      Final result by Mohit Harris MD (11/15/23 21:54:07)                   Impression:      No adverse interval change.  No acute abnormality.      Electronically signed by: Mohit Harris  Date:    11/15/2023  Time:    21:54               Narrative:    EXAMINATION:  CT ABDOMEN PELVIS WITH IV CONTRAST    CLINICAL HISTORY:  Abdominal pain, acute, nonlocalized;    TECHNIQUE:  Multidetector IV contrast enhanced axial CT images of the abdomen and pelvis were obtained with coronal and sagittal reconstructions.    Automatic exposure control was utilized to reduce the patient's radiation dose.    DLP= 779    COMPARISON:  07/20/2023    FINDINGS:  The liver again demonstrates morphologic features of cirrhosis.  No definite intrahepatic mass is appreciated.  The spleen appears surgically absent.  The bilateral kidneys and adrenal glands are unchanged.  The pancreas is unremarkable.  Occlusion of the mesenteric arteries again noted with no acute abnormality of the bowel appreciated.  The lumbar spine is unremarkable.  Postsurgical changes of the left femoral artery unchanged.  The right femoral arteries unchanged.                                       Medications   lactated ringers bolus 1,000 mL (0 mLs Intravenous Stopped 11/15/23 2225)   HYDROmorphone (PF) injection 1 mg (1 mg Intravenous Given 11/15/23 2129)   ondansetron injection 4  mg (4 mg Intravenous Given 11/15/23 2128)   iopamidoL (ISOVUE-370) injection 100 mL (100 mLs Intravenous Given 11/15/23 2141)     Medical Decision Making  Historian:  Patient.  Patient is a 53-year-old female  that presents with nausea, vomiting, diarrhea that has been present for days. Associated symptoms abdominal pain. Surrounding information is history of a mesenteric artery stent. Exacerbated by nothing. Relieved by nothing. Patient treatment prior to arrival nothing. Risk factors include nothing. Other history pertaining to this complaint nothing.   Assessment:  See physical exam.  DD:  Gastroenteritis, gastritis, colitis, diverticulitis      Amount and/or Complexity of Data Reviewed  Labs:      Details: Lab studies were unremarkable  Radiology: ordered.     Details: CT showed no acute findings  Discussion of management or test interpretation with external provider(s): History was obtained.  Physical was performed.  Lab studies and CT showed no acute findings.  Patient was able to tolerate fluids in the ER.  I will send her home on Phenergan.  No medical or surgical consult indicated in ER.  No social determinants that affect healthcare were noted.    Risk  Prescription drug management.                               Clinical Impression:   Final diagnoses:  [R11.10] Vomiting, unspecified vomiting type, unspecified whether nausea present (Primary)  [R19.7] Diarrhea, unspecified type        ED Disposition Condition    Discharge Stable          ED Prescriptions       Medication Sig Dispense Start Date End Date Auth. Provider    promethazine (PHENERGAN) 25 MG tablet Take 1 tablet (25 mg total) by mouth every 6 (six) hours as needed for Nausea. 15 tablet 11/15/2023 -- Prashanth Sheridan FNP          Follow-up Information       Follow up With Specialties Details Why Contact Info    Your Primary Care Provider  Call in 3 days ed follow up              Prashanth Sheridan FNP  11/15/23 4848

## 2023-11-16 NOTE — TELEPHONE ENCOUNTER
----- Message from April Stewart sent at 11/16/2023  1:42 PM CST -----  Regarding: sooner appointment  .Type:  Sooner Apoointment Request    Caller is requesting a sooner appointment.  Caller declined first available appointment listed below.  Caller will not accept being placed on the waitlist and is requesting a message be sent to doctor.  Name of Caller:patient  When is the first available appointment?  Symptoms:nausea, diarrhea, not holding anything down, and severe cramping  Would the patient rather a call back or a response via [facility]?  Call back  Best Call Back Number: [phone]  Additional Information: patient is requesting to be seen sooner due to symptoms above. Please advise.

## 2023-11-16 NOTE — FIRST PROVIDER EVALUATION
Medical screening examination initiated.  I have conducted a focused provider triage encounter, findings are as follows:    Brief history of present illness:  53 year old female presents to the ER for evaluation and nausea, vomiting and diarrhea x 4 days. Last episode of vomiting was this AM. Reports decreased PO intake 5 days. Denies fever/ chills. Reports hx of unknown arterial stent to intestine 5 years ago, no recent GI follow up. Sees unknown vascular surgeon.     Vitals:    11/15/23 1819   BP: 120/79   Pulse: 76   Resp: 20   Temp: 98.1 °F (36.7 °C)   TempSrc: Oral   SpO2: (!) 94%   Weight: 95.3 kg (210 lb)       Pertinent physical exam:  alert, nonlabored, ambulatory    Brief workup plan:  labs    Preliminary workup initiated; this workup will be continued and followed by the physician or advanced practice provider that is assigned to the patient when roomed.

## 2023-11-27 ENCOUNTER — TELEPHONE (OUTPATIENT)
Dept: FAMILY MEDICINE | Facility: CLINIC | Age: 53
End: 2023-11-27
Payer: MEDICAID

## 2023-11-27 NOTE — TELEPHONE ENCOUNTER
----- Message from Shaggy Jones sent at 11/27/2023 11:08 AM CST -----  .Type:  Patient Returning Call    Who Called:pt   Who Left Message for Patient:  Does the patient know what this is regarding?:pain management   Would the patient rather a call back or a response via [facility]? Call back   Best Call Back Number:[phone]  Additional Information: Pt states that she was a current pt of Dr. Jacobsen. Pt states that she called the office and was told to contact the PCP. Called back line and was instructed to have the pt call the NP at Dr. Jacobsen's office and she states that she has done that already.

## 2023-11-27 NOTE — TELEPHONE ENCOUNTER
----- Message from Jillian Hickey sent at 11/27/2023 10:34 AM CST -----  Regarding: refill  .Type:  RX Refill Request    Who Called: pt  Refill or New Rx:refill  RX Name and Strength:gabapentin (NEURONTIN) 300 MG capsule  How is the patient currently taking it? (ex. 1XDay):myron 1 capsule (300 mg total) by mouth 2 (two) times daily  Is this a 30 day or 90 day RX:180  Preferred Pharmacy with phone number:[id number] - [place], [place] - [address]      Local or Mail Order:  Ordering Provider:  Would the patient rather a call back or a response via [facility]? cb  Best Call Back Number:  Additional Information:    \\.Type:  RX Refill Request    Who Called: pt  Refill or New Rx:refill  RX Name and Strength:HYDROcodone-acetaminophen (NORCO) 7.5-325 mg per  How is the patient currently taking it? (ex. 1XDay):Take 1 tablet by mouth every 8 (eight) hours as needed for Pain  Is this a 30 day or 90 day RX:90  Preferred Pharmacy with phone number:  Local or Mail Order:  Ordering Provider:  Would the patient rather a call back or a response via [facility]?   Best Call Back Number:  Additional Information:

## 2023-11-27 NOTE — TELEPHONE ENCOUNTER
Patient notified that our current office will not be refilling the medications at this time and to contact Ann-Marie's NP for further refills until patient re-establishes with new doctor.

## 2023-12-13 ENCOUNTER — OFFICE VISIT (OUTPATIENT)
Dept: FAMILY MEDICINE | Facility: CLINIC | Age: 53
End: 2023-12-13
Payer: MEDICARE

## 2023-12-13 VITALS
TEMPERATURE: 98 F | BODY MASS INDEX: 31.27 KG/M2 | DIASTOLIC BLOOD PRESSURE: 75 MMHG | HEIGHT: 71 IN | WEIGHT: 223.38 LBS | OXYGEN SATURATION: 97 % | HEART RATE: 79 BPM | SYSTOLIC BLOOD PRESSURE: 108 MMHG | RESPIRATION RATE: 18 BRPM

## 2023-12-13 DIAGNOSIS — L98.9 SKIN LESION: Primary | ICD-10-CM

## 2023-12-13 PROCEDURE — 1159F MED LIST DOCD IN RCRD: CPT | Mod: CPTII,,, | Performed by: NURSE PRACTITIONER

## 2023-12-13 PROCEDURE — 1159F PR MEDICATION LIST DOCUMENTED IN MEDICAL RECORD: ICD-10-PCS | Mod: CPTII,,, | Performed by: NURSE PRACTITIONER

## 2023-12-13 PROCEDURE — 2023F PR DILATED RETINAL EXAM W/O EVID OF RETINOPATHY: ICD-10-PCS | Mod: CPTII,,, | Performed by: NURSE PRACTITIONER

## 2023-12-13 PROCEDURE — 3044F PR MOST RECENT HEMOGLOBIN A1C LEVEL <7.0%: ICD-10-PCS | Mod: CPTII,,, | Performed by: NURSE PRACTITIONER

## 2023-12-13 PROCEDURE — 3062F PR POS MACROALBUMINURIA RESULT DOCUMENTED/REVIEW: ICD-10-PCS | Mod: CPTII,,, | Performed by: NURSE PRACTITIONER

## 2023-12-13 PROCEDURE — 90686 FLU VACCINE (QUAD) GREATER THAN OR EQUAL TO 3YO PRESERVATIVE FREE IM: ICD-10-PCS | Mod: ,,, | Performed by: NURSE PRACTITIONER

## 2023-12-13 PROCEDURE — 3044F HG A1C LEVEL LT 7.0%: CPT | Mod: CPTII,,, | Performed by: NURSE PRACTITIONER

## 2023-12-13 PROCEDURE — 99213 PR OFFICE/OUTPT VISIT, EST, LEVL III, 20-29 MIN: ICD-10-PCS | Mod: ,,, | Performed by: NURSE PRACTITIONER

## 2023-12-13 PROCEDURE — 90686 IIV4 VACC NO PRSV 0.5 ML IM: CPT | Mod: ,,, | Performed by: NURSE PRACTITIONER

## 2023-12-13 PROCEDURE — G0008 ADMIN INFLUENZA VIRUS VAC: HCPCS | Mod: ,,, | Performed by: NURSE PRACTITIONER

## 2023-12-13 PROCEDURE — 3078F DIAST BP <80 MM HG: CPT | Mod: CPTII,,, | Performed by: NURSE PRACTITIONER

## 2023-12-13 PROCEDURE — 3078F PR MOST RECENT DIASTOLIC BLOOD PRESSURE < 80 MM HG: ICD-10-PCS | Mod: CPTII,,, | Performed by: NURSE PRACTITIONER

## 2023-12-13 PROCEDURE — G0008 FLU VACCINE (QUAD) GREATER THAN OR EQUAL TO 3YO PRESERVATIVE FREE IM: ICD-10-PCS | Mod: ,,, | Performed by: NURSE PRACTITIONER

## 2023-12-13 PROCEDURE — 3066F NEPHROPATHY DOC TX: CPT | Mod: CPTII,,, | Performed by: NURSE PRACTITIONER

## 2023-12-13 PROCEDURE — 3062F POS MACROALBUMINURIA REV: CPT | Mod: CPTII,,, | Performed by: NURSE PRACTITIONER

## 2023-12-13 PROCEDURE — 2023F DILAT RTA XM W/O RTNOPTHY: CPT | Mod: CPTII,,, | Performed by: NURSE PRACTITIONER

## 2023-12-13 PROCEDURE — 99213 OFFICE O/P EST LOW 20 MIN: CPT | Mod: ,,, | Performed by: NURSE PRACTITIONER

## 2023-12-13 PROCEDURE — 3008F PR BODY MASS INDEX (BMI) DOCUMENTED: ICD-10-PCS | Mod: CPTII,,, | Performed by: NURSE PRACTITIONER

## 2023-12-13 PROCEDURE — 1160F PR REVIEW ALL MEDS BY PRESCRIBER/CLIN PHARMACIST DOCUMENTED: ICD-10-PCS | Mod: CPTII,,, | Performed by: NURSE PRACTITIONER

## 2023-12-13 PROCEDURE — 3066F PR DOCUMENTATION OF TREATMENT FOR NEPHROPATHY: ICD-10-PCS | Mod: CPTII,,, | Performed by: NURSE PRACTITIONER

## 2023-12-13 PROCEDURE — 3008F BODY MASS INDEX DOCD: CPT | Mod: CPTII,,, | Performed by: NURSE PRACTITIONER

## 2023-12-13 PROCEDURE — 3074F PR MOST RECENT SYSTOLIC BLOOD PRESSURE < 130 MM HG: ICD-10-PCS | Mod: CPTII,,, | Performed by: NURSE PRACTITIONER

## 2023-12-13 PROCEDURE — 3074F SYST BP LT 130 MM HG: CPT | Mod: CPTII,,, | Performed by: NURSE PRACTITIONER

## 2023-12-13 PROCEDURE — 1160F RVW MEDS BY RX/DR IN RCRD: CPT | Mod: CPTII,,, | Performed by: NURSE PRACTITIONER

## 2023-12-13 NOTE — PROGRESS NOTES
Subjective:      Patient ID: Vanesa Ricks is a 53 y.o. White female      Chief Complaint: Skin Lesion (trunk of neck/back)       Past Medical History:   Diagnosis Date    Asplenia 06/14/2022    CHF (congestive heart failure)     Chronic pain 05/05/2022    Cigarette smoker 06/14/2022    Diabetes mellitus 05/05/2022    Drug-induced immunodeficiency 08/02/2023    Gastroesophageal reflux disease 05/05/2022    Hepatic cirrhosis 05/05/2022    HLD (hyperlipidemia)     Hypothyroidism 05/05/2022    Impaired mobility 05/05/2022    Lupus anticoagulant disorder 05/05/2022    Lupus vasculitis 05/05/2022    Neuropathy 05/05/2022    Peripheral artery disease     Peripheral vascular disease 05/05/2022    Rheumatoid arthritis     Seronegative rheumatoid arthritis 05/05/2022    Systemic lupus erythematosus, organ or system involvement unspecified         HPI  C/O of skin lesion at base of neck  Crusting,Scab, repeats          Patient Care Team:  Brianna Riddle MD as PCP - General (Family Medicine)  BRENT Lundy MD as Consulting Physician (Gastroenterology)  Tr Hampton MD as Consulting Physician (Ophthalmology)  Siddharth Villa MD as Consulting Physician (Cardiology)  Francisco Espinoza MD as Consulting Physician (Cardiothoracic Surgery)  Cy Stockton MD as Consulting Physician (Pain Medicine)  Monie Rosales MA as Care Coordinator  Saul Mcdaniel MD as Consulting Physician (Dermatology)      Review of Systems   Constitutional: Negative.  Negative for appetite change, chills and fever.   HENT: Negative.     Eyes: Negative.  Negative for discharge and redness.   Respiratory: Negative.  Negative for shortness of breath.    Cardiovascular: Negative.  Negative for chest pain.   Gastrointestinal: Negative.    Endocrine: Negative.    Genitourinary: Negative.    Integumentary:  Positive for mole/lesion. Negative.   Allergic/Immunologic: Negative.    Neurological: Negative.    Psychiatric/Behavioral:  Negative.     All other systems reviewed and are negative.          Objective:      Vitals:    12/13/23 1006   BP: 108/75   Pulse: 79   Resp: 18   Temp: 98 °F (36.7 °C)      Body mass index is 31.16 kg/m².     Physical Exam  Vitals reviewed.   Constitutional:       Appearance: Normal appearance.   HENT:      Head: Normocephalic.      Mouth/Throat:      Mouth: Mucous membranes are moist.   Eyes:      Conjunctiva/sclera: Conjunctivae normal.      Pupils: Pupils are equal, round, and reactive to light.   Cardiovascular:      Rate and Rhythm: Normal rate and regular rhythm.   Pulmonary:      Effort: Pulmonary effort is normal. No respiratory distress.      Breath sounds: Normal breath sounds.   Musculoskeletal:         General: Normal range of motion.      Cervical back: Normal range of motion and neck supple.   Skin:     General: Skin is warm and dry.      Comments: Skin lesion base of posterior neck; mild erythema; crusting and white scale noted   Neurological:      Mental Status: She is alert and oriented to person, place, and time.   Psychiatric:         Mood and Affect: Mood normal.            Current Outpatient Medications:     albuterol (PROAIR HFA) 90 mcg/actuation inhaler, Inhale 2 puffs into the lungs every 6 (six) hours as needed for Wheezing or Shortness of Breath. Rescue, Disp: 18 g, Rfl: 0    BENLYSTA 200 mg/mL Syrg, INJECT 1 ML (200 MG TOTAL) UNDER THE SKIN ONCE A WEEK., Disp: 4 each, Rfl: 11    gabapentin (NEURONTIN) 300 MG capsule, Take 1 capsule (300 mg total) by mouth 2 (two) times daily., Disp: 180 capsule, Rfl: 3    HYDROcodone-acetaminophen (NORCO) 7.5-325 mg per tablet, Take 1 tablet by mouth every 8 (eight) hours as needed for Pain., Disp: 90 tablet, Rfl: 0    hydrOXYchloroQUINE (PLAQUENIL) 200 mg tablet, Take 1 tablet (200 mg total) by mouth 2 (two) times daily., Disp: 180 tablet, Rfl: 3    magnesium oxide (MAG-OX) 400 mg (241.3 mg magnesium) tablet, Take 1 tablet (400 mg total) by mouth  nightly., Disp: 90 tablet, Rfl: 3    methocarbamoL (ROBAXIN) 500 MG Tab, Take 1 tablet (500 mg total) by mouth 2 (two) times a day., Disp: 180 tablet, Rfl: 3    mycophenolate (CELLCEPT) 500 mg Tab, Take 2 tablets (1,000 mg total) by mouth after dinner., Disp: 180 tablet, Rfl: 3    naloxone (NARCAN) 4 mg/actuation Spry, SMARTSIG:Both Nares, Disp: , Rfl:     ondansetron (ZOFRAN-ODT) 8 MG TbDL, Take 1 tablet (8 mg total) by mouth 2 (two) times daily. As needed for nausea, Disp: 30 tablet, Rfl: 1    prasugreL (EFFIENT) 10 mg Tab, Take 1 tablet (10 mg total) by mouth once daily., Disp: 90 tablet, Rfl: 3    promethazine (PHENERGAN) 25 MG tablet, Take 1 tablet (25 mg total) by mouth every 6 (six) hours as needed for Nausea., Disp: 15 tablet, Rfl: 0    rivaroxaban (XARELTO) 10 mg Tab, Take 1 tablet (10 mg total) by mouth daily with dinner or evening meal., Disp: 90 tablet, Rfl: 3    sertraline (ZOLOFT) 100 MG tablet, Take 1 tablet (100 mg total) by mouth once daily., Disp: 30 tablet, Rfl: 11    triamcinolone acetonide 0.1% (KENALOG) 0.1 % ointment, Apply topically 2 (two) times daily., Disp: 30 g, Rfl: 0    zolpidem (AMBIEN) 10 mg Tab, Take 1 tablet (10 mg total) by mouth every evening., Disp: 90 tablet, Rfl: 3    cetirizine (ZYRTEC) 10 MG tablet, Take 1 tablet (10 mg total) by mouth once daily. (Patient not taking: Reported on 9/5/2023), Disp: 30 tablet, Rfl: 0    Assessment & Plan:     Problem List Items Addressed This Visit          Derm    Skin lesion - Primary     Suspicious skin lesion; needs biopsy  Her Dermatologist is Dr. Mcdaniel, she will call for appt  Will contact me if she needs referral  Keep appt with PCP for follow up               Prior to the patient's arrival on the same day, I spent (9) minutes reviewing chart. Once in the exam room with the patient, I spent (6 ) minutes in the room with the member performing a history and exam as well as reviewing the test results and recommendations with the patient.  After leaving the exam room, I spent an additional (5 ) minutes completing the electronic health record. The total time spent that day caring for the member is (20) minutes, and this time - including the breakdown - is documented in the medical record.

## 2023-12-13 NOTE — ASSESSMENT & PLAN NOTE
Suspicious skin lesion; needs biopsy  Her Dermatologist is Dr. Mcdaniel, she will call for appt  Will contact me if she needs referral  Keep appt with PCP for follow up

## 2024-01-01 ENCOUNTER — HOSPITAL ENCOUNTER (EMERGENCY)
Facility: HOSPITAL | Age: 54
Discharge: HOME | End: 2024-01-01
Attending: INTERNAL MEDICINE
Payer: MEDICARE

## 2024-01-01 VITALS
DIASTOLIC BLOOD PRESSURE: 79 MMHG | HEIGHT: 71 IN | WEIGHT: 223.56 LBS | TEMPERATURE: 98 F | SYSTOLIC BLOOD PRESSURE: 127 MMHG | OXYGEN SATURATION: 96 % | BODY MASS INDEX: 31.3 KG/M2 | HEART RATE: 69 BPM | RESPIRATION RATE: 18 BRPM

## 2024-01-01 DIAGNOSIS — G62.9 NEUROPATHY: ICD-10-CM

## 2024-01-01 DIAGNOSIS — Q89.01 ASPLENIA: ICD-10-CM

## 2024-01-01 DIAGNOSIS — K21.9 GASTROESOPHAGEAL REFLUX DISEASE, UNSPECIFIED WHETHER ESOPHAGITIS PRESENT: ICD-10-CM

## 2024-01-01 DIAGNOSIS — M32.9 HISTORY OF SYSTEMIC LUPUS ERYTHEMATOSUS: Primary | ICD-10-CM

## 2024-01-01 DIAGNOSIS — G89.29 OTHER CHRONIC PAIN: ICD-10-CM

## 2024-01-01 DIAGNOSIS — M32.10 SYSTEMIC LUPUS ERYTHEMATOSUS, ORGAN OR SYSTEM INVOLVEMENT UNSPECIFIED: ICD-10-CM

## 2024-01-01 DIAGNOSIS — Z74.09 IMPAIRED MOBILITY: ICD-10-CM

## 2024-01-01 DIAGNOSIS — M06.00 SERONEGATIVE RHEUMATOID ARTHRITIS: ICD-10-CM

## 2024-01-01 DIAGNOSIS — D68.62 LUPUS ANTICOAGULANT DISORDER: ICD-10-CM

## 2024-01-01 DIAGNOSIS — K74.60 HEPATIC CIRRHOSIS, UNSPECIFIED HEPATIC CIRRHOSIS TYPE, UNSPECIFIED WHETHER ASCITES PRESENT: ICD-10-CM

## 2024-01-01 DIAGNOSIS — I77.89 LUPUS VASCULITIS: ICD-10-CM

## 2024-01-01 DIAGNOSIS — M32.19 LUPUS VASCULITIS: ICD-10-CM

## 2024-01-01 PROCEDURE — 99283 EMERGENCY DEPT VISIT LOW MDM: CPT

## 2024-01-01 RX ORDER — HYDROCODONE BITARTRATE AND ACETAMINOPHEN 7.5; 325 MG/1; MG/1
1 TABLET ORAL EVERY 8 HOURS PRN
Qty: 12 TABLET | Refills: 0 | Status: SHIPPED | OUTPATIENT
Start: 2024-01-01

## 2024-01-02 NOTE — ED PROVIDER NOTES
Encounter Date: 1/1/2024       History     Chief Complaint   Patient presents with    Generalized Body Aches     Generalized pain and body aches x 1 day. Hx of Lupus, RA. Ran out of pain meds. Appointment with pcp on 1/8     Presents requesting refill on her Hydrocodone for Lupus related pain. States her rheumatologist retired and her appointment with the new one is on January 8th. Denies any new symptoms or fever, states just need pain medicine until her next appointment    The history is provided by the patient.     Review of patient's allergies indicates:   Allergen Reactions    Heparin Anaphylaxis    Heparin analogues     Vancomycin      Other reaction(s): Blotchy, Hives    Opioids - morphine analogues Nausea And Vomiting     Past Medical History:   Diagnosis Date    Asplenia 06/14/2022    CHF (congestive heart failure)     Chronic pain 05/05/2022    Cigarette smoker 06/14/2022    Diabetes mellitus 05/05/2022    Drug-induced immunodeficiency 08/02/2023    Gastroesophageal reflux disease 05/05/2022    Hepatic cirrhosis 05/05/2022    HLD (hyperlipidemia)     Hypothyroidism 05/05/2022    Impaired mobility 05/05/2022    Lupus anticoagulant disorder 05/05/2022    Lupus vasculitis 05/05/2022    Neuropathy 05/05/2022    Peripheral artery disease     Peripheral vascular disease 05/05/2022    Rheumatoid arthritis     Seronegative rheumatoid arthritis 05/05/2022    Systemic lupus erythematosus, organ or system involvement unspecified      Past Surgical History:   Procedure Laterality Date    ANGIOGRAM, CORONARY, WITH LEFT HEART CATHETERIZATION      APPENDECTOMY      CHOLECYSTECTOMY      FEMORAL BYPASS Bilateral     Surgeon: Olga    femoral to below knee bypass Right 2016    HYSTERECTOMY  07/01/2013    SPLENECTOMY      STENT, SUPERIOR MESENTERIC ARTERY      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   HENT:  Negative for sore throat.    Respiratory:  Negative for shortness of breath.    Cardiovascular:  Negative for chest pain.   Gastrointestinal:  Negative for nausea.   Genitourinary:  Negative for dysuria.   Musculoskeletal:  Positive for arthralgias. Negative for back pain.   Skin:  Negative for rash.   Neurological:  Negative for weakness.   Hematological:  Does not bruise/bleed easily.   All other systems reviewed and are negative.      Physical Exam     Initial Vitals [01/01/24 1854]   BP Pulse Resp Temp SpO2   (!) 178/85 78 18 98 °F (36.7 °C) 99 %      MAP       --         Physical Exam    Nursing note and vitals reviewed.  Constitutional: She appears well-developed and well-nourished. No distress.   HENT:   Head: Normocephalic and atraumatic.   Mouth/Throat: Oropharynx is clear and moist.   Eyes: Conjunctivae are normal. Pupils are equal, round, and reactive to light.   Neck: Neck supple. No JVD present.   Normal range of motion.  Cardiovascular:  Normal rate, regular rhythm, normal heart sounds and intact distal pulses.           Pulmonary/Chest: Breath sounds normal.   Abdominal: Abdomen is soft. Bowel sounds are normal. She exhibits no distension. There is no abdominal tenderness. There is no rebound and no guarding.   Musculoskeletal:         General: No edema. Normal range of motion.      Cervical back: Normal range of motion and neck supple.     Neurological: She is alert and oriented to person, place, and time. She has normal strength. GCS score is 15. GCS eye subscore is 4. GCS verbal subscore is 5. GCS motor subscore is 6.   Skin: Skin is warm and dry. Rash noted.    Psoriatic plaque on her right cheek; hyperpigmented lesions among B/L lower extremities   Psychiatric: Her behavior is normal.         ED Course   Procedures  Labs Reviewed - No data to display       Imaging Results    None          Medications - No data to display  Medical Decision Making                                    Clinical Impression:  Final diagnoses:  [M32.9] History of systemic lupus erythematosus (Primary)  [G89.29] Other chronic pain          ED Disposition Condition    Discharge Stable          ED Prescriptions       Medication Sig Dispense Start Date End Date Auth. Provider    HYDROcodone-acetaminophen (NORCO) 7.5-325 mg per tablet Take 1 tablet by mouth every 8 (eight) hours as needed for Pain. 12 tablet 1/1/2024 -- Ciraan Dalal MD          Follow-up Information    None          Ciaran Dalal MD  01/01/24 6263

## 2024-03-06 RX ORDER — SERTRALINE HYDROCHLORIDE 100 MG/1
100 TABLET, FILM COATED ORAL DAILY
Qty: 30 TABLET | Refills: 11 | Status: SHIPPED | OUTPATIENT
Start: 2024-03-06 | End: 2025-03-01

## 2024-03-06 NOTE — TELEPHONE ENCOUNTER
----- Message from Veronique Russo sent at 3/6/2024  9:57 AM CST -----  Regarding: med refill  .Type:  RX Refill Request    Who Called: Pt  Refill or New Rx:Refill  RX Name and Strength:sertraline (ZOLOFT) 100 MG tablet   How is the patient currently taking it? (ex. 1XDay):1xday  Is this a 30 day or 90 day RX:30  Preferred Pharmacy with phone number:[id number]  [place] - [address]   Local or Mail Order:Local  Ordering Provider:Janessa  Would the patient rather a call back or a response via [facility]? Call back  Best Call Back Number:[phone]  Additional Information: States that pharmacy has been sending over request. Pt will be out before appt on 3/21.

## 2024-03-12 DIAGNOSIS — E78.5 HYPERLIPIDEMIA ASSOCIATED WITH TYPE 2 DIABETES MELLITUS: ICD-10-CM

## 2024-03-12 DIAGNOSIS — D68.62 LUPUS ANTICOAGULANT DISORDER: ICD-10-CM

## 2024-03-12 DIAGNOSIS — E03.9 HYPOTHYROIDISM, UNSPECIFIED TYPE: ICD-10-CM

## 2024-03-12 DIAGNOSIS — R11.0 NAUSEA: ICD-10-CM

## 2024-03-12 DIAGNOSIS — I73.9 PERIPHERAL ARTERY DISEASE: ICD-10-CM

## 2024-03-12 DIAGNOSIS — Z78.0 POSTMENOPAUSAL: ICD-10-CM

## 2024-03-12 DIAGNOSIS — I73.9 PERIPHERAL VASCULAR DISEASE: ICD-10-CM

## 2024-03-12 DIAGNOSIS — Z00.00 MEDICARE ANNUAL WELLNESS VISIT, SUBSEQUENT: ICD-10-CM

## 2024-03-12 DIAGNOSIS — E11.69 HYPERLIPIDEMIA ASSOCIATED WITH TYPE 2 DIABETES MELLITUS: ICD-10-CM

## 2024-03-12 DIAGNOSIS — E11.29 TYPE 2 DIABETES MELLITUS WITH OTHER DIABETIC KIDNEY COMPLICATION, WITHOUT LONG-TERM CURRENT USE OF INSULIN: ICD-10-CM

## 2024-03-12 DIAGNOSIS — F17.210 CIGARETTE SMOKER: Primary | ICD-10-CM

## 2024-03-12 RX ORDER — ONDANSETRON 8 MG/1
8 TABLET, ORALLY DISINTEGRATING ORAL 2 TIMES DAILY
Qty: 30 TABLET | Refills: 1 | Status: SHIPPED | OUTPATIENT
Start: 2024-03-12

## 2024-03-12 NOTE — TELEPHONE ENCOUNTER
----- Message from Jillian Sam sent at 3/12/2024  2:46 PM CDT -----  Regarding: labs  .Caller is requesting to schedule their Lab appointment prior to annual appointment.  Order is not listed in EPIC.  Please enter order and contact patient to schedule.    Name of Caller:pt      Preferred Date and Time of Labs:    Date of [facility] Appointment:04/17/2024    Where would they like the lab performed?unknown      Would the patient rather a call back or a response via [facility]? Cb, if needed.    Best Call Back Number:[phone]    Additional Information:please place lab orders for wellness; thanks.

## 2024-03-12 NOTE — TELEPHONE ENCOUNTER
----- Message from Jillian Jefry sent at 3/12/2024  2:45 PM CDT -----  Regarding: refill  .Type:  RX Refill Request    Who Called: pt    Refill or New Rx:refill    RX Name and Strength:ondansetron (ZOFRAN-ODT) 8 MG TbDL 30 tablet 1 10/19/2023 - No  Sig - Route: Take 1 tablet (8 mg total) by mouth 2 (two) times daily. As needed for nausea - Oral      How is the patient currently taking it? (ex. 1XDay):n/a    Is this a 30 day or 90 day RX:n/a    Preferred Pharmacy with phone number:[id number] - [place] - [address]   Phone: [phone]  Fax: [phone]        Local or Mail Order:local    Ordering Provider:Dr. Riddle    Would the patient rather a call back or a response via [facility]? CB, if needed.    Best Call Back Number:[phone]    Additional Information: refill request. Please advise. Thanks.

## 2024-04-13 ENCOUNTER — HOSPITAL ENCOUNTER (EMERGENCY)
Facility: HOSPITAL | Age: 54
Discharge: HOME | End: 2024-04-13
Attending: EMERGENCY MEDICINE
Payer: MEDICARE

## 2024-04-13 VITALS
OXYGEN SATURATION: 100 % | HEIGHT: 71 IN | WEIGHT: 223.56 LBS | DIASTOLIC BLOOD PRESSURE: 84 MMHG | TEMPERATURE: 98 F | RESPIRATION RATE: 20 BRPM | BODY MASS INDEX: 31.3 KG/M2 | SYSTOLIC BLOOD PRESSURE: 132 MMHG | HEART RATE: 79 BPM

## 2024-04-13 DIAGNOSIS — J44.1 COPD EXACERBATION: ICD-10-CM

## 2024-04-13 DIAGNOSIS — J06.9 VIRAL URI WITH COUGH: Primary | ICD-10-CM

## 2024-04-13 LAB
ANION GAP SERPL CALC-SCNC: 8 MEQ/L
BASOPHILS # BLD AUTO: 0.05 X10(3)/MCL
BASOPHILS NFR BLD AUTO: 0.4 %
BNP BLD-MCNC: 25 PG/ML
BUN SERPL-MCNC: 13.4 MG/DL (ref 9.8–20.1)
CALCIUM SERPL-MCNC: 9.1 MG/DL (ref 8.4–10.2)
CHLORIDE SERPL-SCNC: 108 MMOL/L (ref 98–107)
CO2 SERPL-SCNC: 24 MMOL/L (ref 22–29)
CREAT SERPL-MCNC: 0.84 MG/DL (ref 0.55–1.02)
CREAT/UREA NIT SERPL: 16
D DIMER PPP IA.FEU-MCNC: 1.23 UG/ML FEU (ref 0–0.5)
EOSINOPHIL # BLD AUTO: 0.17 X10(3)/MCL (ref 0–0.9)
EOSINOPHIL NFR BLD AUTO: 1.4 %
ERYTHROCYTE [DISTWIDTH] IN BLOOD BY AUTOMATED COUNT: 13.8 % (ref 11.5–17)
GFR SERPLBLD CREATININE-BSD FMLA CKD-EPI: >60 MLS/MIN/1.73/M2
GLUCOSE SERPL-MCNC: 163 MG/DL (ref 74–100)
HCT VFR BLD AUTO: 44.6 % (ref 37–47)
HGB BLD-MCNC: 14.7 G/DL (ref 12–16)
HOLD SPECIMEN: NORMAL
HOLD SPECIMEN: NORMAL
IMM GRANULOCYTES # BLD AUTO: 0.04 X10(3)/MCL (ref 0–0.04)
IMM GRANULOCYTES NFR BLD AUTO: 0.3 %
LYMPHOCYTES # BLD AUTO: 1.65 X10(3)/MCL (ref 0.6–4.6)
LYMPHOCYTES NFR BLD AUTO: 13.7 %
MCH RBC QN AUTO: 31 PG (ref 27–31)
MCHC RBC AUTO-ENTMCNC: 33 G/DL (ref 33–36)
MCV RBC AUTO: 94.1 FL (ref 80–94)
MONOCYTES # BLD AUTO: 0.37 X10(3)/MCL (ref 0.1–1.3)
MONOCYTES NFR BLD AUTO: 3.1 %
NEUTROPHILS # BLD AUTO: 9.8 X10(3)/MCL (ref 2.1–9.2)
NEUTROPHILS NFR BLD AUTO: 81.1 %
NRBC BLD AUTO-RTO: 0 %
PLATELET # BLD AUTO: 243 X10(3)/MCL (ref 130–400)
PMV BLD AUTO: 9.2 FL (ref 7.4–10.4)
POTASSIUM SERPL-SCNC: 4.2 MMOL/L (ref 3.5–5.1)
RBC # BLD AUTO: 4.74 X10(6)/MCL (ref 4.2–5.4)
SODIUM SERPL-SCNC: 140 MMOL/L (ref 136–145)
TROPONIN I SERPL-MCNC: <0.01 NG/ML (ref 0–0.04)
WBC # SPEC AUTO: 12.08 X10(3)/MCL (ref 4.5–11.5)

## 2024-04-13 PROCEDURE — 85025 COMPLETE CBC W/AUTO DIFF WBC: CPT | Performed by: EMERGENCY MEDICINE

## 2024-04-13 PROCEDURE — 83880 ASSAY OF NATRIURETIC PEPTIDE: CPT | Performed by: EMERGENCY MEDICINE

## 2024-04-13 PROCEDURE — 85379 FIBRIN DEGRADATION QUANT: CPT | Performed by: EMERGENCY MEDICINE

## 2024-04-13 PROCEDURE — 99284 EMERGENCY DEPT VISIT MOD MDM: CPT | Mod: 25

## 2024-04-13 PROCEDURE — 25000242 PHARM REV CODE 250 ALT 637 W/ HCPCS: Performed by: EMERGENCY MEDICINE

## 2024-04-13 PROCEDURE — 84484 ASSAY OF TROPONIN QUANT: CPT | Performed by: EMERGENCY MEDICINE

## 2024-04-13 PROCEDURE — 63600175 PHARM REV CODE 636 W HCPCS: Performed by: EMERGENCY MEDICINE

## 2024-04-13 PROCEDURE — 80048 BASIC METABOLIC PNL TOTAL CA: CPT | Performed by: EMERGENCY MEDICINE

## 2024-04-13 PROCEDURE — 25500020 PHARM REV CODE 255

## 2024-04-13 RX ORDER — PREDNISONE 20 MG/1
60 TABLET ORAL DAILY
Qty: 15 TABLET | Refills: 0 | Status: SHIPPED | OUTPATIENT
Start: 2024-04-13 | End: 2024-04-18

## 2024-04-13 RX ORDER — IPRATROPIUM BROMIDE AND ALBUTEROL SULFATE 2.5; .5 MG/3ML; MG/3ML
3 SOLUTION RESPIRATORY (INHALATION)
Status: COMPLETED | OUTPATIENT
Start: 2024-04-13 | End: 2024-04-13

## 2024-04-13 RX ADMIN — IOHEXOL 100 ML: 350 INJECTION, SOLUTION INTRAVENOUS at 05:04

## 2024-04-13 RX ADMIN — PREDNISONE 60 MG: 50 TABLET ORAL at 03:04

## 2024-04-13 RX ADMIN — IPRATROPIUM BROMIDE AND ALBUTEROL SULFATE 3 ML: .5; 3 SOLUTION RESPIRATORY (INHALATION) at 03:04

## 2024-04-13 NOTE — ED PROVIDER NOTES
ED PROVIDER NOTE  4/13/2024    CHIEF COMPLAINT:   Chief Complaint   Patient presents with    Shortness of Breath    Cough    Pleurisy     C/o pain right side of chest when coughing, intermittent productive cough, sob x 3 days. 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 Hx of copd       HISTORY OF PRESENT ILLNESS:   Vanesa Ricks is a 54 y.o. female who presents with chief complaint Cough.  Onset was about 3 days ago whenever she began having cough associated with shortness of breath and chest discomfort only with coughing and runny nose.  Reports she was around grandchildren who had a cough.  Denies fever or hemoptysis.    The history is provided by the patient.         REVIEW OF SYSTEMS: as noted in the HPI.  NURSING NOTES REVIEWED      PAST MEDICAL/SURGICAL HISTORY:   Past Medical History:   Diagnosis Date    Asplenia 06/14/2022    CHF (congestive heart failure)     Chronic pain 05/05/2022    Cigarette smoker 06/14/2022    Diabetes mellitus 05/05/2022    Drug-induced immunodeficiency 08/02/2023    Gastroesophageal reflux disease 05/05/2022    Hepatic cirrhosis 05/05/2022    HLD (hyperlipidemia)     Hypothyroidism 05/05/2022    Impaired mobility 05/05/2022    Lupus anticoagulant disorder 05/05/2022    Lupus vasculitis 05/05/2022    Neuropathy 05/05/2022    Peripheral artery disease     Peripheral vascular disease 05/05/2022    Rheumatoid arthritis     Seronegative rheumatoid arthritis 05/05/2022    Systemic lupus erythematosus, organ or system involvement unspecified       Past Surgical History:   Procedure Laterality Date    ANGIOGRAM, CORONARY, WITH LEFT HEART CATHETERIZATION      APPENDECTOMY      CHOLECYSTECTOMY      FEMORAL BYPASS Bilateral     Surgeon: Olga    femoral to below knee bypass Right 2016    HYSTERECTOMY  07/01/2013    SPLENECTOMY      STENT, SUPERIOR MESENTERIC ARTERY      TOE AMPUTATION      patital on left and two on right       FAMILY HISTORY:   Family  History   Problem Relation Name Age of Onset    Multiple sclerosis Mother         SOCIAL HISTORY:   Social History     Tobacco Use    Smoking status: Every Day     Current packs/day: 0.50     Types: Cigarettes    Smokeless tobacco: Never    Tobacco comments:     patient reports she smokes marijuana   Substance Use Topics    Alcohol use: Yes     Comment: Socially    Drug use: Yes     Types: Marijuana       ALLERGIES:   Review of patient's allergies indicates:   Allergen Reactions    Heparin Anaphylaxis    Heparin analogues     Vancomycin      Other reaction(s): Blotchy, Hives    Opioids - morphine analogues Nausea And Vomiting       PHYSICAL EXAM:  Initial Vitals [04/13/24 1352]   BP Pulse Resp Temp SpO2   126/89 81 (!) 24 98.1 °F (36.7 °C) (!) 93 %      MAP       --         Physical Exam    Nursing note and vitals reviewed.  Constitutional: She appears well-developed and well-nourished.   HENT:   Head: Normocephalic and atraumatic.   Mouth/Throat: Uvula is midline and mucous membranes are normal.   Eyes: EOM are normal. Pupils are equal, round, and reactive to light.   Neck: Trachea normal. Neck supple.   Cardiovascular:  Normal rate, regular rhythm, intact distal pulses and normal pulses.           Pulmonary/Chest: Effort normal and breath sounds normal.   Abdominal: Abdomen is soft. Bowel sounds are normal. There is no abdominal tenderness. There is no rebound and no guarding.   Musculoskeletal:         General: Normal range of motion.      Cervical back: Neck supple.     Neurological: She is alert and oriented to person, place, and time. GCS eye subscore is 4. GCS verbal subscore is 5. GCS motor subscore is 6.   Skin: Skin is warm and dry.   Psychiatric: She has a normal mood and affect. Her speech is normal. Thought content normal.         RESULTS:  Labs Reviewed   BASIC METABOLIC PANEL - Abnormal; Notable for the following components:       Result Value    Chloride 108 (*)     Glucose Level 163 (*)     All  other components within normal limits   CBC WITH DIFFERENTIAL - Abnormal; Notable for the following components:    WBC 12.08 (*)     MCV 94.1 (*)     Neut # 9.80 (*)     All other components within normal limits   D DIMER, QUANTITATIVE - Abnormal; Notable for the following components:    D-Dimer 1.23 (*)     All other components within normal limits   TROPONIN I - Normal   B-TYPE NATRIURETIC PEPTIDE - Normal   CBC W/ AUTO DIFFERENTIAL    Narrative:     The following orders were created for panel order CBC auto differential.  Procedure                               Abnormality         Status                     ---------                               -----------         ------                     CBC with Differential[[id number]]       Abnormal            Final result                 Please view results for these tests on the individual orders.   EXTRA TUBES    Narrative:     The following orders were created for panel order EXTRA TUBES.  Procedure                               Abnormality         Status                     ---------                               -----------         ------                     Light Blue Top Hold[[id number]]                             Final result               Red Top Hold[[id number]]                                    Final result                 Please view results for these tests on the individual orders.   LIGHT BLUE TOP HOLD   RED TOP HOLD     Imaging Results              CTA Chest Non-Coronary (PE Studies) (Final result)  Result time 04/13/24 18:11:30      Final result by Gorge Leiva MD (04/13/24 18:11:30)                   Impression:      1.  No pulmonary embolism identified.    2.  Details of other findings above.      Electronically signed by: Gorge Leiva  Date:    04/13/2024  Time:    18:11               Narrative:    EXAMINATION:  CTA CHEST NON CORONARY (PE STUDIES)    CLINICAL HISTORY:  Pulmonary embolism (PE) suspected, positive D-dimer;    TECHNIQUE:  Sequential  axial images performed of the chest using pulmonary embolism protocol following intravenous contrast bolus. Sagittal and contrast reformations performed.    Dose product length of 370 mGycm. Automated exposure control was utilized to minimize radiation dose.    COMPARISON:  December 31, 2022.    FINDINGS:  Optimal contrast bolus timing with adequately opacified pulmonary artery system is without evidence of filling defects from pulmonary thromboembolism within the main pulmonary artery and the major branches.  Thoracic aorta is without aneurysmal dilatation or dissection.  Coronary arteries calcified plaques.  Cardiac chamber size is within normal limits.  There is similar hypoattenuation in the precarinal location which may represent some bronchogenic fluid or within the cardiac recess.  There is no pericardial effusion.    Lungs are remarkable for emphysematous changes mostly involving the upper lung lobes.  There is some scarring right middle lung lobe.  No acute Groundglass pneumonitis or pulmonary venous hypertension. There are no pulmonary consolidations.  There is no pleural effusion.  There are no enlarging chest lymph nodes    Liver is remarkable steatosis and there is surface nodularity.  No hepatic focal space occupying lesion.                        Wet Read by Jamie Lovell DO (04/13/24 17:59:50, [facility], Emergency Medicine)    No saddle PE.                                     X-Ray Chest AP Portable (Final result)  Result time 04/13/24 15:36:28      Final result by Chris Granda MD (04/13/24 15:36:28)                   Impression:      No acute pulmonary process identified.      Electronically signed by: Chris Granda  Date:    04/13/2024  Time:    15:36               Narrative:    EXAMINATION:  XR CHEST AP PORTABLE    CLINICAL HISTORY:  cough;    TECHNIQUE:  Frontal view(s) of the chest.    COMPARISON:  Radiography 12/31/2022    FINDINGS:  Normal cardiac silhouette.   The lungs are well-inflated.  No consolidation identified.  No significant pleural effusion or discernible pneumothorax.                                      PROCEDURES:  Procedures    ECG:       ED COURSE AND MEDICAL DECISION MAKING:  Medications   albuterol-ipratropium 2.5 mg-0.5 mg/3 mL nebulizer solution 3 mL (3 mLs Nebulization Given 4/13/24 1558)   predniSONE tablet 60 mg (60 mg Oral Given 4/13/24 1558)   iohexoL (OMNIPAQUE 350) 350 mg iodine/mL injection (100 mLs  Given 4/13/24 1745)     ED Course as of 04/13/24 1823   Sat Apr 13, 2024   1518 WBC(!): 12.08 [IB]   1518 Hemoglobin: 14.7 [IB]   1518 Platelet Count: 243 [IB]   1544 Creatinine: 0.84 [IB]   1544 BUN: 13.4 [IB]   1544 Upon reexamination, patient has more prominent wheezing noted so we will give DuoNeb along with prednisone. [IB]   1555 BNP: 25.0 [IB]   1555 Troponin I: <0.010 [IB]   1652 D-Dimer(!): 1.23 [IB]      ED Course User Index  [IB] Jamie Lovell, DO        Medical Decision Making  54-year-old female known history of COPD and CHF presents with shortness of breath, cough, and chest pain only with coughing over the past couple of days.  Differential diagnosis includes pneumonia, PE, CHF, COPD exacerbation.  Lungs initially clear on auscultation but after spinning sometime in the ED did start to develop a wheeze so she was given DuoNeb with resolution of her wheezing.  Troponin negative.  BNP normal.  CBC does show leukocytosis of 12.  Chest x-ray shows no acute intrathoracic process.  D-dimer elevated at 1.23 so CTA chest was obtained which showed no evidence of PE.  All labs and imaging discussed with the patient.  We will send her home with prednisone 60 mg for 5 days and instructed to continue her inhalers.  Given strict ED return precautions. I have spoken with the patient and/or caregivers. I have explained the patient's condition, diagnoses and treatment plan based on the information available to me at this time. I have answered the  patient's and/or caregiver's questions and addressed any concerns. The patient and/or caregivers have as good an understanding of the patient's diagnosis, condition and treatment plan as can be expected at this point. The vital signs have been stable. The patient's condition is stable and appropriate for discharge from the emergency department.     The patient will pursue further outpatient evaluation with the primary care physician or other designated or consulting physician as outlined in the discharge instructions. The patient and/or caregivers are agreeable to this plan of care and follow-up instructions have been explained in detail. The patient and/or caregivers have received these instructions in written format and have expressed an understanding of the discharge instructions. The patient and/or caregivers are aware that any significant change in condition or worsening of symptoms should prompt an immediate return to this or the closest emergency department or a call to 911.    Amount and/or Complexity of Data Reviewed  External Data Reviewed: notes.  Labs: ordered. Decision-making details documented in ED Course.  Radiology: ordered and independent interpretation performed.    Risk  OTC drugs.  Prescription drug management.  Decision regarding hospitalization.        CLINICAL IMPRESSION:  1. Viral URI with cough    2. COPD exacerbation        DISPOSITION:   ED Disposition Condition    Discharge Stable            ED Prescriptions       Medication Sig Dispense Start Date End Date Auth. Provider    predniSONE (DELTASONE) 20 MG tablet Take 3 tablets (60 mg total) by mouth once daily. for 5 days 15 tablet 4/13/2024 4/18/2024 Jamie Lovell, DO          Follow-up Information       Follow up With Specialties Details Why Contact Info    Brianna Riddle MD Family Medicine Schedule an appointment as soon as possible for a visit   [address] [place] [address]  [address]  [place] [address]  [phone]      [facility] Emergency Dept Emergency Medicine  If symptoms worsen [address] [facility] [id number] [phone]               Jamie Lovell DO  04/13/24 1821

## 2024-04-17 ENCOUNTER — OFFICE VISIT (OUTPATIENT)
Dept: FAMILY MEDICINE | Facility: CLINIC | Age: 54
End: 2024-04-17
Payer: MEDICARE

## 2024-04-17 ENCOUNTER — CLINICAL SUPPORT (OUTPATIENT)
Dept: FAMILY MEDICINE | Facility: CLINIC | Age: 54
End: 2024-04-17
Attending: FAMILY MEDICINE
Payer: MEDICARE

## 2024-04-17 VITALS
HEART RATE: 72 BPM | DIASTOLIC BLOOD PRESSURE: 88 MMHG | OXYGEN SATURATION: 97 % | HEIGHT: 71 IN | TEMPERATURE: 98 F | WEIGHT: 222.81 LBS | BODY MASS INDEX: 31.19 KG/M2 | RESPIRATION RATE: 16 BRPM | SYSTOLIC BLOOD PRESSURE: 130 MMHG

## 2024-04-17 DIAGNOSIS — E11.29 TYPE 2 DIABETES MELLITUS WITH OTHER DIABETIC KIDNEY COMPLICATION, WITHOUT LONG-TERM CURRENT USE OF INSULIN: ICD-10-CM

## 2024-04-17 DIAGNOSIS — G89.29 OTHER CHRONIC PAIN: ICD-10-CM

## 2024-04-17 DIAGNOSIS — G62.9 NEUROPATHY: ICD-10-CM

## 2024-04-17 DIAGNOSIS — I73.9 PERIPHERAL VASCULAR DISEASE: ICD-10-CM

## 2024-04-17 DIAGNOSIS — M32.19 LUPUS VASCULITIS: ICD-10-CM

## 2024-04-17 DIAGNOSIS — M06.00 SERONEGATIVE RHEUMATOID ARTHRITIS: ICD-10-CM

## 2024-04-17 DIAGNOSIS — Z78.0 POSTMENOPAUSAL: ICD-10-CM

## 2024-04-17 DIAGNOSIS — Z12.31 BREAST CANCER SCREENING BY MAMMOGRAM: ICD-10-CM

## 2024-04-17 DIAGNOSIS — E03.9 HYPOTHYROIDISM, UNSPECIFIED TYPE: ICD-10-CM

## 2024-04-17 DIAGNOSIS — F17.210 CIGARETTE SMOKER: ICD-10-CM

## 2024-04-17 DIAGNOSIS — Z71.89 ADVANCED CARE PLANNING/COUNSELING DISCUSSION: ICD-10-CM

## 2024-04-17 DIAGNOSIS — Z00.00 MEDICARE ANNUAL WELLNESS VISIT, SUBSEQUENT: Primary | ICD-10-CM

## 2024-04-17 DIAGNOSIS — I73.9 PERIPHERAL ARTERY DISEASE: ICD-10-CM

## 2024-04-17 DIAGNOSIS — L98.9 NON-HEALING SKIN LESION: ICD-10-CM

## 2024-04-17 DIAGNOSIS — E78.5 HYPERLIPIDEMIA ASSOCIATED WITH TYPE 2 DIABETES MELLITUS: ICD-10-CM

## 2024-04-17 DIAGNOSIS — E66.09 CLASS 1 OBESITY DUE TO EXCESS CALORIES WITH SERIOUS COMORBIDITY AND BODY MASS INDEX (BMI) OF 31.0 TO 31.9 IN ADULT: ICD-10-CM

## 2024-04-17 DIAGNOSIS — R45.4 IRRITABLE MOOD: ICD-10-CM

## 2024-04-17 DIAGNOSIS — J40 BRONCHITIS: ICD-10-CM

## 2024-04-17 DIAGNOSIS — E11.69 HYPERLIPIDEMIA ASSOCIATED WITH TYPE 2 DIABETES MELLITUS: ICD-10-CM

## 2024-04-17 DIAGNOSIS — I50.9 CONGESTIVE HEART FAILURE, UNSPECIFIED HF CHRONICITY, UNSPECIFIED HEART FAILURE TYPE: ICD-10-CM

## 2024-04-17 DIAGNOSIS — I77.89 LUPUS VASCULITIS: ICD-10-CM

## 2024-04-17 PROCEDURE — 3075F SYST BP GE 130 - 139MM HG: CPT | Mod: CPTII,,, | Performed by: FAMILY MEDICINE

## 2024-04-17 PROCEDURE — 1159F MED LIST DOCD IN RCRD: CPT | Mod: CPTII,,, | Performed by: FAMILY MEDICINE

## 2024-04-17 PROCEDURE — 1160F RVW MEDS BY RX/DR IN RCRD: CPT | Mod: CPTII,,, | Performed by: FAMILY MEDICINE

## 2024-04-17 PROCEDURE — G0439 PPPS, SUBSEQ VISIT: HCPCS | Mod: ,,, | Performed by: FAMILY MEDICINE

## 2024-04-17 PROCEDURE — 92228 IMG RTA DETC/MNTR DS PHY/QHP: CPT | Mod: TC,,, | Performed by: FAMILY MEDICINE

## 2024-04-17 PROCEDURE — 2025F 7 FLD RTA PHOTO W/O RTNOPTHY: CPT | Mod: CPTII,,, | Performed by: OPTOMETRIST

## 2024-04-17 PROCEDURE — 3008F BODY MASS INDEX DOCD: CPT | Mod: CPTII,,, | Performed by: FAMILY MEDICINE

## 2024-04-17 PROCEDURE — 92228 IMG RTA DETC/MNTR DS PHY/QHP: CPT | Mod: 26,,, | Performed by: OPTOMETRIST

## 2024-04-17 PROCEDURE — 99213 OFFICE O/P EST LOW 20 MIN: CPT | Mod: 25,,, | Performed by: FAMILY MEDICINE

## 2024-04-17 PROCEDURE — 3079F DIAST BP 80-89 MM HG: CPT | Mod: CPTII,,, | Performed by: FAMILY MEDICINE

## 2024-04-17 RX ORDER — ASPIRIN 325 MG
50000 TABLET, DELAYED RELEASE (ENTERIC COATED) ORAL
COMMUNITY
Start: 2024-02-08

## 2024-04-17 RX ORDER — PREDNISONE 5 MG/1
5 TABLET ORAL DAILY PRN
COMMUNITY
Start: 2024-01-08

## 2024-04-17 RX ORDER — NEBULIZER AND COMPRESSOR
EACH MISCELLANEOUS
Qty: 1 EACH | Refills: 0 | Status: SHIPPED | OUTPATIENT
Start: 2024-04-17

## 2024-04-17 RX ORDER — IPRATROPIUM BROMIDE AND ALBUTEROL SULFATE 2.5; .5 MG/3ML; MG/3ML
3 SOLUTION RESPIRATORY (INHALATION) EVERY 6 HOURS PRN
Qty: 75 ML | Refills: 6 | Status: SHIPPED | OUTPATIENT
Start: 2024-04-17 | End: 2025-04-17

## 2024-04-17 RX ORDER — PROMETHAZINE HYDROCHLORIDE AND DEXTROMETHORPHAN HYDROBROMIDE 6.25; 15 MG/5ML; MG/5ML
5 SYRUP ORAL EVERY 4 HOURS PRN
Qty: 180 ML | Refills: 0 | Status: SHIPPED | OUTPATIENT
Start: 2024-04-17 | End: 2024-05-01 | Stop reason: SDUPTHER

## 2024-04-17 NOTE — PROGRESS NOTES
Vanesa Ricks is a 54 y.o. female here for a diabetic eye screening with non-dilated fundus photos per Dr. Riddle.    Patient cooperative?: Yes  Small pupils?: No  Last eye exam: N/A    For exam results, see Encounter Report.

## 2024-04-17 NOTE — ASSESSMENT & PLAN NOTE
On norco 7.5mg TID per pain management, Dr. Stockton, in [place]. She sees him monthly. Also on gabapentin

## 2024-04-17 NOTE — ASSESSMENT & PLAN NOTE
Lab Results   Component Value Date    HGBA1C 5.9 03/31/2023     Urine micro ordered  Foot exam today  Eye exam in office today     Diet controlled

## 2024-04-17 NOTE — ASSESSMENT & PLAN NOTE
Fasting labs ordered. Will call with results when available.  History of hyst so no longer does pap  Mammogram 4/2023.   ordered  DEXA  ordered  Colonoscopy with Dr. whiting 9/2020      Advanced care planning discussed and paperwork given

## 2024-04-17 NOTE — PROGRESS NOTES
Patient ID: [id number]     Chief Complaint: Medicare AWV (Wellness )      HPI:     Vanesa Ricks is a 54 y.o. female here today for a Medicare Wellness. No other complaints today.     Patient presents to the clinic unaccompanied for her wellness visit. Has c/o.  She is due for labs.      Went to ER on 4/13/24 for sob.  Work up was negative other than slightly elevated d dimer.  Cta negative for PE.  Was given prednisone and sent home. Told to continue inhalers.  Still feeling sob.  Still has cough.  Still has wheeze. Does not have nebulizer. No fever. Not feeling flu like.  Babysat 3 and 5 year old niece and nephew.  Taking nyquil.     She has bruise on vaginal area x 1 year. Has crusty area on right back of neck x 1 year than will not resolve.     She was having symptoms so saw dr. Espinoza around 6/2023 for similar symptoms.  She went to ER on 7/20/23 for epigastric abd pain worse after eating. 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  She was discharged home with no meds. C/o nausea. Asking for meds.  She would like to see new vascular as dr. Espinoza told her he was retiring at her lov.  Was referred at ER visit but has not heard from anyone.  We will place referral again. She is still smoking.            She has lupus vasculitis, PAD/PVD, CHF, HLD.  She has seen Dr. Espinoza for mesenteric stenting in 8/2019.  Had had 4 fem pops (2 on each leg).  Her cardiologist is Dr. Villa. She had partial left great toe amputation and right great and second toes amputated. She has neuropathy and is also on gabapentin.      She has chronic pain: seeing pain mgmt in [place]- Dr. Stockton.  She is on norco 7.5mg from his office.  May be establishing with new pain mgmt as it is difficult for her to get to [place] and they may ask her to start doing injections instead of just prescribing pain meds.      She is mobility impaired.  has handicap tag.  Sometimes uses cane or  walker. Cannot walk more than 200 ft without stopping to rest.      She has RA, lupus (dx at age 14), lupus vasculitis.  She was seeing Dr. Smith, rheumatology.  Her lov was 11/29/21.  She is on plaquenil, benlysta, olumiant, xarelto and prednisone.  She has not had follow up since Dr. Smith has moved. She is now seeing dr. Jacobsen. Last appt about 3 months ago. Has appt 4/2024. Dr. Smith referred her to [facility] and she saw dr. Cabrales on 2/21/22.  He advised to continue lifelong xarelto and follow up in 6 months     She has diabetes likely due to long term steroid use. Is diet controlled. Eye exams with dr. Hampton. In office today.  urine micro 3/2023. Foot exam today.  A1c 5.6 8/2022     She has gerd and was on protonix.  Not taking.      On zoloft for her mood     She is obese.      Has hypothyroid and is on synthroid.  Has not been taking for a few months.  Was giving headaches.      Mmg 4/2023.  Ordered. She is postmenopausal.  Never done dexa.  Will order.  She has had hysterectomy and no longer does pap. Colonoscopy with dr. Lundy 9/2020     She is a smoker. 1 pack last 2.5 weeks. Smoking cessation referral placed.  She is allergic to heparin and vancomycin.             Past Surgical History:   Procedure Laterality Date    ANGIOGRAM, CORONARY, WITH LEFT HEART CATHETERIZATION      APPENDECTOMY      CHOLECYSTECTOMY      FEMORAL BYPASS Bilateral     Surgeon: Olga    femoral to below knee bypass Right 2016    HYSTERECTOMY  07/01/2013    SPLENECTOMY      STENT, SUPERIOR MESENTERIC ARTERY      TOE AMPUTATION      patital on left and two on right       Review of patient's allergies indicates:   Allergen Reactions    Heparin Anaphylaxis    Heparin analogues     Vancomycin      Other reaction(s): Blotchy, Hives    Opioids - morphine analogues Nausea And Vomiting       Current Outpatient Medications   Medication Sig Dispense Refill    albuterol (PROAIR HFA) 90 mcg/actuation inhaler Inhale 2 puffs into the lungs  every 6 (six) hours as needed for Wheezing or Shortness of Breath. Rescue 18 g 0    BENLYSTA 200 mg/mL Syrg INJECT 1 ML (200 MG TOTAL) UNDER THE SKIN ONCE A WEEK. 4 each 11    cholecalciferol, vitamin D3, 1,250 mcg (50,000 unit) capsule Take 50,000 Units by mouth every 7 days.      gabapentin (NEURONTIN) 300 MG capsule Take 1 capsule (300 mg total) by mouth 2 (two) times daily. 180 capsule 3    HYDROcodone-acetaminophen (NORCO) 7.5-325 mg per tablet Take 1 tablet by mouth every 8 (eight) hours as needed for Pain. 12 tablet 0    hydrOXYchloroQUINE (PLAQUENIL) 200 mg tablet Take 1 tablet (200 mg total) by mouth 2 (two) times daily. 180 tablet 3    magnesium oxide (MAG-OX) 400 mg (241.3 mg magnesium) tablet Take 1 tablet (400 mg total) by mouth nightly. 90 tablet 3    methocarbamoL (ROBAXIN) 500 MG Tab Take 1 tablet (500 mg total) by mouth 2 (two) times a day. 180 tablet 3    mycophenolate (CELLCEPT) 500 mg Tab Take 2 tablets (1,000 mg total) by mouth after dinner. 180 tablet 3    naloxone (NARCAN) 4 mg/actuation Spry SMARTSIG:Both Nares      ondansetron (ZOFRAN-ODT) 8 MG TbDL Take 1 tablet (8 mg total) by mouth 2 (two) times daily. As needed for nausea 30 tablet 1    prasugreL (EFFIENT) 10 mg Tab Take 1 tablet (10 mg total) by mouth once daily. 90 tablet 3    predniSONE (DELTASONE) 20 MG tablet Take 3 tablets (60 mg total) by mouth once daily. for 5 days 15 tablet 0    predniSONE (DELTASONE) 5 MG tablet Take 5 mg by mouth daily as needed.      promethazine (PHENERGAN) 25 MG tablet Take 1 tablet (25 mg total) by mouth every 6 (six) hours as needed for Nausea. 15 tablet 0    sertraline (ZOLOFT) 100 MG tablet Take 1 tablet (100 mg total) by mouth once daily. 30 tablet 11    triamcinolone acetonide 0.1% (KENALOG) 0.1 % ointment Apply topically 2 (two) times daily. 30 g 0    zolpidem (AMBIEN) 10 mg Tab Take 1 tablet (10 mg total) by mouth every evening. 90 tablet 3    albuterol-ipratropium (DUO-NEB) 2.5 mg-0.5 mg/3 mL  nebulizer solution Take 3 mLs by nebulization every 6 (six) hours as needed for Wheezing. Rescue 75 mL 6    nebulizer and compressor Radha Use as directed. Dx J40 1 each 0    promethazine-dextromethorphan (PROMETHAZINE-DM) 6.25-15 mg/5 mL Syrp Take 5 mLs by mouth every 4 (four) hours as needed (cough). 180 mL 0    rivaroxaban (XARELTO) 10 mg Tab Take 1 tablet (10 mg total) by mouth daily with dinner or evening meal. 90 tablet 3     No current facility-administered medications for this visit.       Social History     Socioeconomic History    Marital status: Single   Tobacco Use    Smoking status: Every Day     Current packs/day: 0.50     Types: Cigarettes    Smokeless tobacco: Never    Tobacco comments:     patient reports she smokes marijuana   Substance and Sexual Activity    Alcohol use: Yes     Comment: Socially    Drug use: Yes     Types: Marijuana    Sexual activity: Yes        Family History   Problem Relation Name Age of Onset    Multiple sclerosis Mother          Patient Care Team:  Brianna Riddle MD as PCP - General (Family Medicine)  BRENT Lundy MD as Consulting Physician (Gastroenterology)  Tr Hampton MD as Consulting Physician (Ophthalmology)  Siddharth Villa MD as Consulting Physician (Cardiology)  Francisco Espinoza MD as Consulting Physician (Cardiothoracic Surgery)  Cy Stockton MD as Consulting Physician (Pain Medicine)  Monie Rosales MA as Care Coordinator  Saul Mcdaniel MD as Consulting Physician (Dermatology)       Subjective:     Review of Systems   Constitutional: Negative.  Negative for chills and fever.   HENT: Negative.     Eyes: Negative.    Respiratory:  Positive for cough and wheezing. Negative for hemoptysis and sputum production.    Cardiovascular: Negative.    Gastrointestinal: Negative.    Genitourinary: Negative.    Musculoskeletal: Negative.    Skin: Negative.    Neurological: Negative.    Endo/Heme/Allergies: Negative.    Psychiatric/Behavioral: Negative.            Patient Reported Health Risk Assessment  What is your age?:  (54)  Are you male or female?: Female  During the past four weeks, how much have you been bothered by emotional problems such as feeling anxious, depressed, irritable, sad, or downhearted and blue?: Not at all  During the past five weeks, has your physical and/or emotional health limited your social activities with family, friends, neighbors, or groups?: Not at all  During the past four weeks, how much bodily pain have you generally had?: Moderate pain  During the past four weeks, was someone available to help if you needed and wanted help?: Yes, as much as I wanted  During the past four weeks, what was the hardest physical activity you could do for at least two minutes?: Light  Can you get to places out of walking distance without help?  (For example, can you travel alone on buses or taxis, or drive your own car?): Yes  Can you go shopping for groceries or clothes without someone's help?: Yes  Can you prepare your own meals?: Yes  Can you do your own housework without help?: Yes  Because of any health problems, do you need the help of another person with your personal care needs such as eating, bathing, dressing, or getting around the house?: No  Can you handle your own money without help?: Yes  During the past four weeks, how would you rate your health in general?: Poor  How have things been going for you during the past four weeks?: Good and bad parts about equal  Are you having difficulties driving your car?: No  Do you always fasten your seat belt when you are in a car?: Yes, usually  How often in the past four weeks have you been bothered by falling or dizzy when standing up?: Sometimes  How often in the past four weeks have you been bothered by sexual problems?: Never  How often in the past four weeks have you been bothered by trouble eating well?: Never  How often in the past four weeks have you been bothered by teeth or denture problems?:  "Never  How often in the past four weeks have you been bothered with problems using the telephone?: Never  How often in the past four weeks have you been bothered by tiredness or fatigue?: Never  Have you fallen two or more times in the past year?: No  Are you afraid of falling?: No  Are you a smoker?: Yes, I'm not ready to quit  During the past four weeks, how many drinks of wine, beer, or other alcoholic beverages did you have?: One drink or less per week  Do you exercise for about 20 minutes three or more days a week?: No, I usually do not exercise this much  Have you been given any information to help you with hazards in your house that might hurt you?: No  Have you been given any information to help you with keeping track of your medications?: No  How often do you have trouble taking medicines the way you've been told to take them?: I always take them as prescribed  How confident are you that you can control and manage most of your health problems?: Very confident  What is your race? (Check all that apply.):     Objective:     /88 (BP Location: Left arm)   Pulse 72   Temp 98.4 °F (36.9 °C) (Temporal)   Resp 16   Ht 5' 11" (1.803 m)   Wt 101.1 kg (222 lb 12.8 oz)   LMP  (LMP Unknown)   SpO2 97%   BMI 31.07 kg/m²     Physical Exam  Vitals and nursing note reviewed.   Constitutional:       Appearance: Normal appearance. She is obese.   HENT:      Head: Normocephalic and atraumatic.      Nose: Nose normal.      Mouth/Throat:      Mouth: Mucous membranes are moist.      Pharynx: Oropharynx is clear.   Eyes:      Extraocular Movements: Extraocular movements intact.   Cardiovascular:      Rate and Rhythm: Normal rate and regular rhythm.      Pulses:           Dorsalis pedis pulses are 1+ on the right side and 1+ on the left side.        Posterior tibial pulses are 1+ on the right side and 1+ on the left side.      Heart sounds: Normal heart sounds.   Pulmonary:      Effort: Pulmonary effort is " normal. No respiratory distress.      Breath sounds: Wheezing present.   Musculoskeletal:         General: Normal range of motion.      Cervical back: Normal range of motion.        Feet:    Feet:      Right foot:      Protective Sensation: 6 sites tested.  4 sites sensed.      Left foot:      Protective Sensation: 8 sites tested.  7 sites sensed.      Skin integrity: Blister present.      Comments: Right 1st and 2nd toes amputated. Left partial amputation 1st toe.   X= cannot feel  Skin:     General: Skin is warm and dry.      Findings: Lesion present.             Comments: Raised rough irregular shaped lesion to right upper back/neck area.  Similar lesion to right cheek. SK to pelvic area   Neurological:      General: No focal deficit present.      Mental Status: She is alert and oriented to person, place, and time. Mental status is at baseline.   Psychiatric:         Mood and Affect: Mood normal.                No data to display                  4/17/2024     9:30 AM 12/13/2023    10:00 AM 9/5/2023     9:30 AM 8/17/2023     9:20 AM 8/2/2023     4:15 PM 4/27/2023     8:30 AM 3/20/2023     4:00 PM   Fall Risk Assessment - Outpatient   Mobility Status Ambulatory Ambulatory Ambulatory Ambulatory Ambulatory Ambulatory w/ assistance Ambulatory   Number of falls 0 2+ 0 1 0 2+ 0   Identified as fall risk False True False False False True False           Depression Screening  Over the past two weeks, has the patient felt down, depressed, or hopeless?: No  Over the past two weeks, has the patient felt little interest or pleasure in doing things?: No  Functional Ability/Safety Screening  Was the patient's timed Up & Go test unsteady or longer than 30 seconds?: No  Does the patient need help with phone, transportation, shopping, preparing meals, housework, laundry, meds, or managing money?: No  Does the patient's home have rugs in the hallway, lack grab bars in the bathroom, lack handrails on the stairs or have poor  lighting?: No  Have you noticed any hearing difficulties?: No  Cognitive Function (Assessed through direct observation with due consideration of information obtained by way of patient reports and/or concerns raised by family, friends, caretakers, or others)    Does the patient repeat questions/statements in the same day?: No  Does the patient have trouble remembering the date, year, and time?: No  Does the patient have difficulty managing finances?: No  Does the patient have a decreased sense of direction?: No  Assessment/Plan:       Medicare Annual Wellness and Personalized Prevention Plan:   Fall Risk + Home Safety + Hearing Impairment + Depression Screen + Cognitive Impairment Screen + Health Risk Assessment all reviewed.     Opioid Screening: Patient medication list reviewed, patient is taking prescription opioids. Patient is not using additional opioids than prescribed. Patient is at low risk of substance abuse based on this opioid use history.         Health Maintenance Topics with due status: Not Due       Topic Last Completion Date    Colorectal Cancer Screening 09/20/2020    Pneumococcal Vaccines (Age 0-64) 11/29/2021    Eye Exam 04/17/2024      The patient's Health Maintenance was reviewed and the following appears to be due at this time:   Health Maintenance Due   Topic Date Due    COVID-19 Vaccine (1) Never done    Hemoglobin A1c  09/30/2023    Foot Exam  03/20/2024    Lipid Panel  03/31/2024    Mammogram  04/10/2024    Diabetes Urine Screening  03/31/2024         1. Medicare annual wellness visit, subsequent  Assessment & Plan:  Fasting labs ordered. Will call with results when available.  History of hyst so no longer does pap  Mammogram 4/2023.   ordered  DEXA  ordered  Colonoscopy with Dr. whiting 9/2020      Advanced care planning discussed and paperwork given      Orders:  -     CBC Auto Differential; Future; Expected date: 04/17/2024  -     Comprehensive Metabolic Panel; Future; Expected date:  04/17/2024  -     Lipid Panel; Future; Expected date: 04/17/2024  -     TSH; Future; Expected date: 04/17/2024  -     Hemoglobin A1C; Future; Expected date: 04/17/2024  -     Urinalysis; Future; Expected date: 04/17/2024  -     Microalbumin/Creatinine Ratio, Urine; Future; Expected date: 04/17/2024    2. Advanced care planning/counseling discussion  Assessment & Plan:  Advanced care planning discussed and paperwork given.    I attest that I have had a face to face discussion with patient and or surrogate decision maker.   Included surrogate decision maker: NO  Advanced directive in chart: NO  LAPOST: NO    Total time spent: 16 minutes      Orders:  -     CBC Auto Differential; Future; Expected date: 04/17/2024  -     Comprehensive Metabolic Panel; Future; Expected date: 04/17/2024  -     Lipid Panel; Future; Expected date: 04/17/2024  -     TSH; Future; Expected date: 04/17/2024  -     Hemoglobin A1C; Future; Expected date: 04/17/2024  -     Urinalysis; Future; Expected date: 04/17/2024  -     Microalbumin/Creatinine Ratio, Urine; Future; Expected date: 04/17/2024    3. Lupus vasculitis  Assessment & Plan:  Keep appts with rheum. Previously seeing dr. chavez at [facility]. Keep appts with dr. Jacobsen. Reports compliance with prescription meds    Orders:  -     CBC Auto Differential; Future; Expected date: 04/17/2024  -     Comprehensive Metabolic Panel; Future; Expected date: 04/17/2024  -     Lipid Panel; Future; Expected date: 04/17/2024  -     TSH; Future; Expected date: 04/17/2024  -     Hemoglobin A1C; Future; Expected date: 04/17/2024  -     Urinalysis; Future; Expected date: 04/17/2024  -     Microalbumin/Creatinine Ratio, Urine; Future; Expected date: 04/17/2024    4. Peripheral artery disease  Assessment & Plan:   On blood thinners. Encouraged smoking cessation. Keep appointments with cards- dr. Villa. Was previously seeing dr. Espinoza.      Orders:  -     CBC Auto Differential; Future; Expected date:  04/17/2024  -     Comprehensive Metabolic Panel; Future; Expected date: 04/17/2024  -     Lipid Panel; Future; Expected date: 04/17/2024  -     TSH; Future; Expected date: 04/17/2024  -     Hemoglobin A1C; Future; Expected date: 04/17/2024  -     Urinalysis; Future; Expected date: 04/17/2024  -     Microalbumin/Creatinine Ratio, Urine; Future; Expected date: 04/17/2024    5. Peripheral vascular disease  Assessment & Plan:  See PAD A&P    Orders:  -     CBC Auto Differential; Future; Expected date: 04/17/2024  -     Comprehensive Metabolic Panel; Future; Expected date: 04/17/2024  -     Lipid Panel; Future; Expected date: 04/17/2024  -     TSH; Future; Expected date: 04/17/2024  -     Hemoglobin A1C; Future; Expected date: 04/17/2024  -     Urinalysis; Future; Expected date: 04/17/2024  -     Microalbumin/Creatinine Ratio, Urine; Future; Expected date: 04/17/2024    6. Congestive heart failure, unspecified HF chronicity, unspecified heart failure type  Assessment & Plan:  Stable. Keep appts with cards    Orders:  -     CBC Auto Differential; Future; Expected date: 04/17/2024  -     Comprehensive Metabolic Panel; Future; Expected date: 04/17/2024  -     Lipid Panel; Future; Expected date: 04/17/2024  -     TSH; Future; Expected date: 04/17/2024  -     Hemoglobin A1C; Future; Expected date: 04/17/2024  -     Urinalysis; Future; Expected date: 04/17/2024  -     Microalbumin/Creatinine Ratio, Urine; Future; Expected date: 04/17/2024    7. Hyperlipidemia associated with type 2 diabetes mellitus  Assessment & Plan:  Was on lipitor. Keep appts with cardiology- dr. mckeon    Orders:  -     CBC Auto Differential; Future; Expected date: 04/17/2024  -     Comprehensive Metabolic Panel; Future; Expected date: 04/17/2024  -     Lipid Panel; Future; Expected date: 04/17/2024  -     TSH; Future; Expected date: 04/17/2024  -     Hemoglobin A1C; Future; Expected date: 04/17/2024  -     Urinalysis; Future; Expected date: 04/17/2024  -      Microalbumin/Creatinine Ratio, Urine; Future; Expected date: 04/17/2024    8. Type 2 diabetes mellitus with other diabetic kidney complication, without long-term current use of insulin  Assessment & Plan:  Lab Results   Component Value Date    HGBA1C 5.9 03/31/2023     Urine micro ordered  Foot exam today  Eye exam in office today     Diet controlled     Orders:  -     Diabetic Eye Screening Photo; Future  -     CBC Auto Differential; Future; Expected date: 04/17/2024  -     Comprehensive Metabolic Panel; Future; Expected date: 04/17/2024  -     Lipid Panel; Future; Expected date: 04/17/2024  -     TSH; Future; Expected date: 04/17/2024  -     Hemoglobin A1C; Future; Expected date: 04/17/2024  -     Urinalysis; Future; Expected date: 04/17/2024  -     Microalbumin/Creatinine Ratio, Urine; Future; Expected date: 04/17/2024    9. Seronegative rheumatoid arthritis  Assessment & Plan:  Keep appts with dr. Jacobsen. Reports compliance with prescription meds    Orders:  -     CBC Auto Differential; Future; Expected date: 04/17/2024  -     Comprehensive Metabolic Panel; Future; Expected date: 04/17/2024  -     Lipid Panel; Future; Expected date: 04/17/2024  -     TSH; Future; Expected date: 04/17/2024  -     Hemoglobin A1C; Future; Expected date: 04/17/2024  -     Urinalysis; Future; Expected date: 04/17/2024  -     Microalbumin/Creatinine Ratio, Urine; Future; Expected date: 04/17/2024    10. Other chronic pain  Assessment & Plan:  On norco 7.5mg TID per pain management, Dr. Stockton, in [place]. She sees him monthly. Also on gabapentin    Orders:  -     CBC Auto Differential; Future; Expected date: 04/17/2024  -     Comprehensive Metabolic Panel; Future; Expected date: 04/17/2024  -     Lipid Panel; Future; Expected date: 04/17/2024  -     TSH; Future; Expected date: 04/17/2024  -     Hemoglobin A1C; Future; Expected date: 04/17/2024  -     Urinalysis; Future; Expected date: 04/17/2024  -     Microalbumin/Creatinine Ratio,  Urine; Future; Expected date: 04/17/2024    11. Irritable mood  Assessment & Plan:  Well controlled on zoloft    Orders:  -     CBC Auto Differential; Future; Expected date: 04/17/2024  -     Comprehensive Metabolic Panel; Future; Expected date: 04/17/2024  -     Lipid Panel; Future; Expected date: 04/17/2024  -     TSH; Future; Expected date: 04/17/2024  -     Hemoglobin A1C; Future; Expected date: 04/17/2024  -     Urinalysis; Future; Expected date: 04/17/2024  -     Microalbumin/Creatinine Ratio, Urine; Future; Expected date: 04/17/2024    12. Neuropathy  Assessment & Plan:  On gabapentin. Keep appts with pain mgmt    Orders:  -     CBC Auto Differential; Future; Expected date: 04/17/2024  -     Comprehensive Metabolic Panel; Future; Expected date: 04/17/2024  -     Lipid Panel; Future; Expected date: 04/17/2024  -     TSH; Future; Expected date: 04/17/2024  -     Hemoglobin A1C; Future; Expected date: 04/17/2024  -     Urinalysis; Future; Expected date: 04/17/2024  -     Microalbumin/Creatinine Ratio, Urine; Future; Expected date: 04/17/2024    13. Breast cancer screening by mammogram  Assessment & Plan:  [facility] 4/2023. ordered    Orders:  -     Mammo Digital Screening Bilat w/ Bartolo; Future; Expected date: 04/17/2024    14. Postmenopausal  Assessment & Plan:  dexa ordered.     Orders:  -     DXA Bone Density Axial Skeleton 1 or more sites; Future; Expected date: 04/17/2024  -     CBC Auto Differential; Future; Expected date: 04/17/2024  -     Comprehensive Metabolic Panel; Future; Expected date: 04/17/2024  -     Lipid Panel; Future; Expected date: 04/17/2024  -     TSH; Future; Expected date: 04/17/2024  -     Hemoglobin A1C; Future; Expected date: 04/17/2024  -     Urinalysis; Future; Expected date: 04/17/2024  -     Microalbumin/Creatinine Ratio, Urine; Future; Expected date: 04/17/2024    15. Hypothyroidism, unspecified type  Assessment & Plan:  Lab Results   Component Value Date    TSH 1.706 03/31/2023      States has not been taking synthroid. Caused headaches. Labs ordered    Orders:  -     CBC Auto Differential; Future; Expected date: 04/17/2024  -     Comprehensive Metabolic Panel; Future; Expected date: 04/17/2024  -     Lipid Panel; Future; Expected date: 04/17/2024  -     TSH; Future; Expected date: 04/17/2024  -     Hemoglobin A1C; Future; Expected date: 04/17/2024  -     Urinalysis; Future; Expected date: 04/17/2024  -     Microalbumin/Creatinine Ratio, Urine; Future; Expected date: 04/17/2024    16. Class 1 obesity due to excess calories with serious comorbidity and body mass index (BMI) of 31.0 to [age]  Assessment & Plan:  Encouraged lifestyle change    Orders:  -     CBC Auto Differential; Future; Expected date: 04/17/2024  -     Comprehensive Metabolic Panel; Future; Expected date: 04/17/2024  -     Lipid Panel; Future; Expected date: 04/17/2024  -     TSH; Future; Expected date: 04/17/2024  -     Hemoglobin A1C; Future; Expected date: 04/17/2024  -     Urinalysis; Future; Expected date: 04/17/2024  -     Microalbumin/Creatinine Ratio, Urine; Future; Expected date: 04/17/2024    17. Cigarette smoker  Assessment & Plan:  Encouraged cessation. Referral placed. Discussed how smoking negatively affects her circulatory issues    Orders:  -     CBC Auto Differential; Future; Expected date: 04/17/2024  -     Comprehensive Metabolic Panel; Future; Expected date: 04/17/2024  -     Lipid Panel; Future; Expected date: 04/17/2024  -     TSH; Future; Expected date: 04/17/2024  -     Hemoglobin A1C; Future; Expected date: 04/17/2024  -     Urinalysis; Future; Expected date: 04/17/2024  -     Microalbumin/Creatinine Ratio, Urine; Future; Expected date: 04/17/2024  -     Ambulatory referral/consult to Smoking Cessation Program; Future; Expected date: 04/24/2024    18. Bronchitis  Assessment & Plan:  Seen in ER on 4/13/24 for this issue.  Given prednisone and sent home. Still c/o cough and wheeze.  Will send  in rx for duoneb.  Rx for neb printed and given to patient. Use as directed.  Will also send in rx for cough syrup with phenergan. Cautioned may cause drowsiness so do not take before work or driving. Advised to not take her phenergan tabs while taking cough syrup. Encourage fluids. Rest. Contact clinic for concerns. Patient is agreeable to plan and verbalized understanding.     Orders:  -     NEBULIZER FOR HOME USE  -     albuterol-ipratropium (DUO-NEB) 2.5 mg-0.5 mg/3 mL nebulizer solution; Take 3 mLs by nebulization every 6 (six) hours as needed for Wheezing. Rescue  Dispense: 75 mL; Refill: 6  -     promethazine-dextromethorphan (PROMETHAZINE-DM) 6.25-15 mg/5 mL Syrp; Take 5 mLs by mouth every 4 (four) hours as needed (cough).  Dispense: 180 mL; Refill: 0  -     NEBULIZER FOR HOME USE  -     NEBULIZER KIT (SUPPLIES) FOR HOME USE  -     nebulizer and compressor Radha; Use as directed. Dx J40  Dispense: 1 each; Refill: 0    19. Non-healing skin lesion  Assessment & Plan:  X 1 year to posterior neck/upper back area.  Will place referral to derm for eval/likely biopsy.     Orders:  -     Ambulatory referral/consult to Dermatology; Future; Expected date: 04/24/2024         Advance Care Planning   I attest to discussing Advance Care Planning with patient and/or family member.  Education was provided including the importance of the Health Care Power of , Advance Directives, and/or [place] documentation.  The patient expressed understanding to the importance of this information and discussion.  Length of ACP conversation in minutes: 16           Separate complaint outside of wellness visit  CC:   Went to ER on 4/13/24 for sob.  Work up was negative other than slightly elevated d dimer.  Cta negative for PE.  Was given prednisone and sent home. Told to continue inhalers.  Still feeling sob.  Still has cough.  Still has wheeze. Does not have nebulizer. No fever. Not feeling flu like.  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  Taking nyquil.     She has bruise on vaginal area x 1 year. Has crusty area on right back of neck x 1 year than will not resolve.   ROS: negative other than above  PE: chest: wheezing diffuse in all fields. Not wearing O2  A/P:     1. Bronchitis-  Seen in ER on 4/13/24 for this issue.  Given prednisone and sent home. Still c/o cough and wheeze.  Will send in rx for duoneb.  Rx for neb printed and given to patient. Use as directed.  Will also send in rx for cough syrup with phenergan. Cautioned may cause drowsiness so do not take before work or driving. Advised to not take her phenergan tabs while taking cough syrup. Encourage fluids. Rest. Contact clinic for concerns. Patient is agreeable to plan and verbalized understanding.     2. Nonhealing skin lesion- X 1 year to posterior neck/upper back area.  Will place referral to derm for eval/likely biopsy.           Medication List with Changes/Refills   New Medications    ALBUTEROL-IPRATROPIUM (DUO-NEB) 2.5 MG-0.5 MG/3 ML NEBULIZER SOLUTION    Take 3 mLs by nebulization every 6 (six) hours as needed for Wheezing. Rescue       Start Date: 4/17/2024 End Date: 4/17/2025    NEBULIZER AND COMPRESSOR JADEN    Use as directed. Dx J40       Start Date: 4/17/2024 End Date: --    PROMETHAZINE-DEXTROMETHORPHAN (PROMETHAZINE-DM) 6.25-15 MG/5 ML SYRP    Take 5 mLs by mouth every 4 (four) hours as needed (cough).       Start Date: 4/17/2024 End Date: --   Current Medications    ALBUTEROL (PROAIR HFA) 90 MCG/ACTUATION INHALER    Inhale 2 puffs into the lungs every 6 (six) hours as needed for Wheezing or Shortness of Breath. Rescue       Start Date: 12/28/2022End Date: --    BENLYSTA 200 MG/ML SYRG    INJECT 1 ML (200 MG TOTAL) UNDER THE SKIN ONCE A WEEK.       Start Date: 10/24/2023End Date: --    CHOLECALCIFEROL, VITAMIN D3, 1,250 MCG (50,000 UNIT) CAPSULE    Take 50,000 Units by mouth every 7 days.       Start Date: 2/8/2024  End Date: --    GABAPENTIN (NEURONTIN) 300 MG  CAPSULE    Take 1 capsule (300 mg total) by mouth 2 (two) times daily.       Start Date: 11/2/2023 End Date: --    HYDROCODONE-ACETAMINOPHEN (NORCO) 7.5-325 MG PER TABLET    Take 1 tablet by mouth every 8 (eight) hours as needed for Pain.       Start Date: 1/1/2024  End Date: --    HYDROXYCHLOROQUINE (PLAQUENIL) 200 MG TABLET    Take 1 tablet (200 mg total) by mouth 2 (two) times daily.       Start Date: 9/5/2023  End Date: --    MAGNESIUM OXIDE (MAG-OX) 400 MG (241.3 MG MAGNESIUM) TABLET    Take 1 tablet (400 mg total) by mouth nightly.       Start Date: 9/5/2023  End Date: --    METHOCARBAMOL (ROBAXIN) 500 MG TAB    Take 1 tablet (500 mg total) by mouth 2 (two) times a day.       Start Date: 9/5/2023  End Date: --    MYCOPHENOLATE (CELLCEPT) 500 MG TAB    Take 2 tablets (1,000 mg total) by mouth after dinner.       Start Date: 9/5/2023  End Date: 9/4/2024    NALOXONE (NARCAN) 4 MG/ACTUATION SPRY    SMARTSIG:Both Nares       Start Date: 9/23/2022 End Date: --    ONDANSETRON (ZOFRAN-ODT) 8 MG TBDL    Take 1 tablet (8 mg total) by mouth 2 (two) times daily. As needed for nausea       Start Date: 3/12/2024 End Date: --    PRASUGREL (EFFIENT) 10 MG TAB    Take 1 tablet (10 mg total) by mouth once daily.       Start Date: 4/25/2023 End Date: 4/24/2024    PREDNISONE (DELTASONE) 20 MG TABLET    Take 3 tablets (60 mg total) by mouth once daily. for 5 days       Start Date: 4/13/2024 End Date: 4/18/2024    PREDNISONE (DELTASONE) 5 MG TABLET    Take 5 mg by mouth daily as needed.       Start Date: 1/8/2024  End Date: --    PROMETHAZINE (PHENERGAN) 25 MG TABLET    Take 1 tablet (25 mg total) by mouth every 6 (six) hours as needed for Nausea.       Start Date: 11/15/2023End Date: --    RIVAROXABAN (XARELTO) 10 MG TAB    Take 1 tablet (10 mg total) by mouth daily with dinner or evening meal.       Start Date: 3/20/2023 End Date: 3/19/2024    SERTRALINE (ZOLOFT) 100 MG TABLET    Take 1 tablet (100 mg total) by mouth once daily.        Start Date: 3/6/2024  End Date: 3/1/2025    TRIAMCINOLONE ACETONIDE 0.1% (KENALOG) 0.1 % OINTMENT    Apply topically 2 (two) times daily.       Start Date: 8/11/2023 End Date: --    ZOLPIDEM (AMBIEN) 10 MG TAB    Take 1 tablet (10 mg total) by mouth every evening.       Start Date: 9/5/2023  End Date: 8/30/2024   Discontinued Medications    CETIRIZINE (ZYRTEC) 10 MG TABLET    Take 1 tablet (10 mg total) by mouth once daily.       Start Date: 10/11/2022End Date: 4/17/2024        Follow up in about 1 year (around 4/17/2025) for Medicare Wellness with labs. In addition to their scheduled follow up, the patient has also been instructed to follow up on as needed basis.
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
## 2024-04-17 NOTE — ASSESSMENT & PLAN NOTE
Keep appts with rheum. Previously seeing dr. chavez at [facility]. Keep appts with dr. Jacobsen. Reports compliance with prescription meds

## 2024-04-17 NOTE — ASSESSMENT & PLAN NOTE
Seen in ER on 4/13/24 for this issue.  Given prednisone and sent home. Still c/o cough and wheeze.  Will send in rx for duoneb.  Rx for neb printed and given to patient. Use as directed.  Will also send in rx for cough syrup with phenergan. Cautioned may cause drowsiness so do not take before work or driving. Advised to not take her phenergan tabs while taking cough syrup. Encourage fluids. Rest. Contact clinic for concerns. Patient is agreeable to plan and verbalized understanding.

## 2024-04-17 NOTE — ASSESSMENT & PLAN NOTE
Lab Results   Component Value Date    TSH 1.706 03/31/2023     States has not been taking synthroid. Caused headaches. Labs ordered

## 2024-04-17 NOTE — ASSESSMENT & PLAN NOTE
On blood thinners. Encouraged smoking cessation. Keep appointments with cards- dr. Villa. Was previously seeing dr. Espinoza.

## 2024-04-18 ENCOUNTER — TELEPHONE (OUTPATIENT)
Dept: FAMILY MEDICINE | Facility: CLINIC | Age: 54
End: 2024-04-18
Payer: MEDICARE

## 2024-04-18 NOTE — TELEPHONE ENCOUNTER
----- Message from Brianna Riddle MD sent at 4/18/2024  3:15 PM CDT -----  Diabetic Eye Results: Please inform patient of NORMAL Diabetic Eye Screen, indicating no signs of damage to the retina from diabetes. Repeat screening in 1 year.

## 2024-05-01 ENCOUNTER — LAB VISIT (OUTPATIENT)
Dept: LAB | Facility: HOSPITAL | Age: 54
End: 2024-05-01
Attending: FAMILY MEDICINE
Payer: MEDICARE

## 2024-05-01 ENCOUNTER — OFFICE VISIT (OUTPATIENT)
Dept: FAMILY MEDICINE | Facility: CLINIC | Age: 54
End: 2024-05-01
Payer: MEDICARE

## 2024-05-01 VITALS
DIASTOLIC BLOOD PRESSURE: 84 MMHG | HEIGHT: 71 IN | TEMPERATURE: 98 F | WEIGHT: 224.38 LBS | OXYGEN SATURATION: 94 % | HEART RATE: 72 BPM | SYSTOLIC BLOOD PRESSURE: 130 MMHG | BODY MASS INDEX: 31.41 KG/M2 | RESPIRATION RATE: 16 BRPM

## 2024-05-01 DIAGNOSIS — G62.9 NEUROPATHY: ICD-10-CM

## 2024-05-01 DIAGNOSIS — E66.09 CLASS 1 OBESITY DUE TO EXCESS CALORIES WITH SERIOUS COMORBIDITY AND BODY MASS INDEX (BMI) OF 31.0 TO 31.9 IN ADULT: ICD-10-CM

## 2024-05-01 DIAGNOSIS — F17.210 CIGARETTE SMOKER: ICD-10-CM

## 2024-05-01 DIAGNOSIS — Z71.89 ADVANCED CARE PLANNING/COUNSELING DISCUSSION: ICD-10-CM

## 2024-05-01 DIAGNOSIS — M32.19 LUPUS VASCULITIS: ICD-10-CM

## 2024-05-01 DIAGNOSIS — E78.5 HYPERLIPIDEMIA ASSOCIATED WITH TYPE 2 DIABETES MELLITUS: ICD-10-CM

## 2024-05-01 DIAGNOSIS — E11.29 TYPE 2 DIABETES MELLITUS WITH OTHER DIABETIC KIDNEY COMPLICATION, WITHOUT LONG-TERM CURRENT USE OF INSULIN: ICD-10-CM

## 2024-05-01 DIAGNOSIS — E03.9 HYPOTHYROIDISM, UNSPECIFIED TYPE: ICD-10-CM

## 2024-05-01 DIAGNOSIS — G89.29 OTHER CHRONIC PAIN: ICD-10-CM

## 2024-05-01 DIAGNOSIS — I73.9 PERIPHERAL ARTERY DISEASE: ICD-10-CM

## 2024-05-01 DIAGNOSIS — I50.9 CONGESTIVE HEART FAILURE, UNSPECIFIED HF CHRONICITY, UNSPECIFIED HEART FAILURE TYPE: ICD-10-CM

## 2024-05-01 DIAGNOSIS — Z00.00 MEDICARE ANNUAL WELLNESS VISIT, SUBSEQUENT: ICD-10-CM

## 2024-05-01 DIAGNOSIS — M06.00 SERONEGATIVE RHEUMATOID ARTHRITIS: ICD-10-CM

## 2024-05-01 DIAGNOSIS — I77.89 LUPUS VASCULITIS: ICD-10-CM

## 2024-05-01 DIAGNOSIS — I73.9 PERIPHERAL VASCULAR DISEASE: ICD-10-CM

## 2024-05-01 DIAGNOSIS — E11.69 HYPERLIPIDEMIA ASSOCIATED WITH TYPE 2 DIABETES MELLITUS: ICD-10-CM

## 2024-05-01 DIAGNOSIS — Z78.0 POSTMENOPAUSAL: ICD-10-CM

## 2024-05-01 DIAGNOSIS — J40 BRONCHITIS: Primary | ICD-10-CM

## 2024-05-01 DIAGNOSIS — R45.4 IRRITABLE MOOD: ICD-10-CM

## 2024-05-01 LAB
ALBUMIN SERPL-MCNC: 3 G/DL (ref 3.5–5)
ALBUMIN/GLOB SERPL: 0.9 RATIO (ref 1.1–2)
ALP SERPL-CCNC: 105 UNIT/L (ref 40–150)
ALT SERPL-CCNC: 26 UNIT/L (ref 0–55)
AST SERPL-CCNC: 23 UNIT/L (ref 5–34)
BASOPHILS # BLD AUTO: 0.07 X10(3)/MCL
BASOPHILS NFR BLD AUTO: 0.8 %
BILIRUB SERPL-MCNC: 0.4 MG/DL
BUN SERPL-MCNC: 20.9 MG/DL (ref 9.8–20.1)
CALCIUM SERPL-MCNC: 8.9 MG/DL (ref 8.4–10.2)
CHLORIDE SERPL-SCNC: 106 MMOL/L (ref 98–107)
CHOLEST SERPL-MCNC: 193 MG/DL
CHOLEST/HDLC SERPL: 6 {RATIO} (ref 0–5)
CO2 SERPL-SCNC: 23 MMOL/L (ref 22–29)
CREAT SERPL-MCNC: 0.91 MG/DL (ref 0.55–1.02)
CREAT UR-MCNC: 119.3 MG/DL (ref 45–106)
EOSINOPHIL # BLD AUTO: 0.22 X10(3)/MCL (ref 0–0.9)
EOSINOPHIL NFR BLD AUTO: 2.5 %
ERYTHROCYTE [DISTWIDTH] IN BLOOD BY AUTOMATED COUNT: 13.6 % (ref 11.5–17)
EST. AVERAGE GLUCOSE BLD GHB EST-MCNC: 137 MG/DL
GFR SERPLBLD CREATININE-BSD FMLA CKD-EPI: >60 MLS/MIN/1.73/M2
GLOBULIN SER-MCNC: 3.3 GM/DL (ref 2.4–3.5)
GLUCOSE SERPL-MCNC: 137 MG/DL (ref 74–100)
HBA1C MFR BLD: 6.4 %
HCT VFR BLD AUTO: 45.6 % (ref 37–47)
HDLC SERPL-MCNC: 34 MG/DL (ref 35–60)
HGB BLD-MCNC: 15.2 G/DL (ref 12–16)
IMM GRANULOCYTES # BLD AUTO: 0.03 X10(3)/MCL (ref 0–0.04)
IMM GRANULOCYTES NFR BLD AUTO: 0.3 %
LDLC SERPL CALC-MCNC: 104 MG/DL (ref 50–140)
LYMPHOCYTES # BLD AUTO: 2.76 X10(3)/MCL (ref 0.6–4.6)
LYMPHOCYTES NFR BLD AUTO: 31 %
MCH RBC QN AUTO: 30.9 PG (ref 27–31)
MCHC RBC AUTO-ENTMCNC: 33.3 G/DL (ref 33–36)
MCV RBC AUTO: 92.7 FL (ref 80–94)
MICROALBUMIN UR-MCNC: >2000 UG/ML
MICROALBUMIN/CREAT RATIO PNL UR: ABNORMAL
MONOCYTES # BLD AUTO: 0.37 X10(3)/MCL (ref 0.1–1.3)
MONOCYTES NFR BLD AUTO: 4.2 %
NEUTROPHILS # BLD AUTO: 5.45 X10(3)/MCL (ref 2.1–9.2)
NEUTROPHILS NFR BLD AUTO: 61.2 %
NRBC BLD AUTO-RTO: 0 %
PLATELET # BLD AUTO: 284 X10(3)/MCL (ref 130–400)
PMV BLD AUTO: 9.3 FL (ref 7.4–10.4)
POTASSIUM SERPL-SCNC: 3.8 MMOL/L (ref 3.5–5.1)
PROT SERPL-MCNC: 6.3 GM/DL (ref 6.4–8.3)
RBC # BLD AUTO: 4.92 X10(6)/MCL (ref 4.2–5.4)
SODIUM SERPL-SCNC: 138 MMOL/L (ref 136–145)
TRIGL SERPL-MCNC: 275 MG/DL (ref 37–140)
TSH SERPL-ACNC: 1.9 UIU/ML (ref 0.35–4.94)
VLDLC SERPL CALC-MCNC: 55 MG/DL
WBC # SPEC AUTO: 8.9 X10(3)/MCL (ref 4.5–11.5)

## 2024-05-01 PROCEDURE — 85025 COMPLETE CBC W/AUTO DIFF WBC: CPT

## 2024-05-01 PROCEDURE — 80061 LIPID PANEL: CPT

## 2024-05-01 PROCEDURE — 83036 HEMOGLOBIN GLYCOSYLATED A1C: CPT

## 2024-05-01 PROCEDURE — 3075F SYST BP GE 130 - 139MM HG: CPT | Mod: CPTII,,, | Performed by: FAMILY MEDICINE

## 2024-05-01 PROCEDURE — 3079F DIAST BP 80-89 MM HG: CPT | Mod: CPTII,,, | Performed by: FAMILY MEDICINE

## 2024-05-01 PROCEDURE — 84443 ASSAY THYROID STIM HORMONE: CPT

## 2024-05-01 PROCEDURE — 82043 UR ALBUMIN QUANTITATIVE: CPT

## 2024-05-01 PROCEDURE — 99214 OFFICE O/P EST MOD 30 MIN: CPT | Mod: ,,, | Performed by: FAMILY MEDICINE

## 2024-05-01 PROCEDURE — 3008F BODY MASS INDEX DOCD: CPT | Mod: CPTII,,, | Performed by: FAMILY MEDICINE

## 2024-05-01 PROCEDURE — 1160F RVW MEDS BY RX/DR IN RCRD: CPT | Mod: CPTII,,, | Performed by: FAMILY MEDICINE

## 2024-05-01 PROCEDURE — 80053 COMPREHEN METABOLIC PANEL: CPT

## 2024-05-01 PROCEDURE — 36415 COLL VENOUS BLD VENIPUNCTURE: CPT

## 2024-05-01 PROCEDURE — 1159F MED LIST DOCD IN RCRD: CPT | Mod: CPTII,,, | Performed by: FAMILY MEDICINE

## 2024-05-01 RX ORDER — AMOXICILLIN AND CLAVULANATE POTASSIUM 875; 125 MG/1; MG/1
1 TABLET, FILM COATED ORAL EVERY 12 HOURS
Qty: 20 TABLET | Refills: 0 | Status: SHIPPED | OUTPATIENT
Start: 2024-05-01 | End: 2024-05-11

## 2024-05-01 RX ORDER — PROMETHAZINE HYDROCHLORIDE AND DEXTROMETHORPHAN HYDROBROMIDE 6.25; 15 MG/5ML; MG/5ML
5 SYRUP ORAL EVERY 4 HOURS PRN
Qty: 240 ML | Refills: 0 | Status: SHIPPED | OUTPATIENT
Start: 2024-05-01

## 2024-05-01 NOTE — ASSESSMENT & PLAN NOTE
Seen in ER on 4/13/24 for this issue.  Given prednisone and sent home. At visit 4/17/24, seen in our clinic,   rx for duoneb.  Will also send in rx for cough syrup with phenergan. Cautioned may cause drowsiness so do not take before work or driving. Advised to not take her phenergan tabs while taking cough syrup.     Today she reports still not improved.  Will send in augmentin as well as refill of duoneb and cough syrup.  Continue on her prednisone.  Continue on nebs.  Cxr ordered. Will call with results when available.  Wbc on cbc from today is wnl.  Other labs still pending. Will call with results when available. Encourage fluids. Rest.     Contact clinic for concerns. Patient is agreeable to plan and verbalized understanding.

## 2024-05-01 NOTE — PROGRESS NOTES
Subjective:        Patient ID: Vanesa Ricks is a 54 y.o. female.    Chief Complaint: Follow-up (Cough )      Patient presents to the clinic unaccompanied for cough follow up.  She is due for her wellness visit in april      Went to ER on 4/13/24 for sob.  Work up was negative other than slightly elevated d dimer.  Cta negative for PE.  Was given prednisone and sent home. Told to continue inhalers. At her visit on 4/17/24, she reported still feeling sob.  Still has cough.  Still has wheeze. Does not have nebulizer. No fever. Not feeling flu like.  Babysat 3 and 5 year old niece and nephew.  Taking nyquil.  We sent in duoneb, nebulizer and pro- DM cough syrup.  She is here today stating not feeling better. Still coughing.  Cough is productive of green sputum. Not a lot.  Completed cough syrup. Doing nebs.  Not really helping.  Had night sweat one night. Tried mucinex otc with no relief.  Has some nasal congestion and rhinorrhea (clear).  No other symptoms.  She has dark colored nail polish on her fingernails.                She has bruise on vaginal area x 1 year. Has crusty area on right back of neck x 1 year than will not resolve.      She was having symptoms so saw dr. Espinoza around 6/2023 for similar symptoms.  She went to ER on 7/20/23 for epigastric abd pain worse after eating. 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  She was discharged home with no meds. C/o nausea. Asking for meds.  She would like to see new vascular as dr. Espinoza told her he was retiring at her lov.  Was referred at ER visit but has not heard from anyone.  We will place referral again. She is still smoking.       She has lupus vasculitis, PAD/PVD, CHF, HLD.  She has seen Dr. Espinoza for mesenteric stenting in 8/2019.  Had had 4 fem pops (2 on each leg).  Her cardiologist is Dr. Villa. She had partial left great toe amputation and right great and second toes amputated.  She has neuropathy and is also on gabapentin.      She has chronic pain: seeing pain mgmt in [place]- Dr. Stockton.  She is on norco 7.5mg from his office.  May be establishing with new pain mgmt as it is difficult for her to get to [place] and they may ask her to start doing injections instead of just prescribing pain meds.      She is mobility impaired.  has handicap tag.  Sometimes uses cane or walker. Cannot walk more than 200 ft without stopping to rest.      She has RA, lupus (dx at age 14), lupus vasculitis.  She was seeing Dr. Smith, rheumatology.  Her lov was 11/29/21.  She is on plaquenil, benlysta, olumiant, xarelto and prednisone.  She has not had follow up since Dr. Smith has moved. She is now seeing dr. Jacobsen. Last appt about 3 months ago. Has appt 4/2024. Dr. Smith referred her to [facility] and she saw dr. Cabrales on 2/21/22.  He advised to continue lifelong xarelto and follow up in 6 months     She has diabetes likely due to long term steroid use. Is diet controlled. Eye exams with dr. Hampton. In office today.  urine micro 3/2023. Foot exam 4/2024.  A1c 5.6 8/2022     She has gerd and was on protonix.  Not taking.      On zoloft for her mood     She is obese.      Has hypothyroid and is on synthroid.  Has not been taking for a few months.  Was giving headaches.      Mmg 4/2023.  Ordered. She is postmenopausal.  Never done dexa.  Will order.  She has had hysterectomy and no longer does pap. Colonoscopy with dr. Lundy 9/2020     She is a smoker. 1 pack last 2.5 weeks. Smoking cessation referral placed.  She is allergic to heparin and vancomycin.                Review of Systems   Constitutional: Negative.  Negative for chills and fever.   HENT:  Positive for congestion and rhinorrhea. Negative for sinus pressure, sneezing and sore throat.    Respiratory:  Positive for cough and wheezing. Negative for chest tightness and shortness of breath.    Cardiovascular: Negative.    Gastrointestinal: Negative.   "  Genitourinary: Negative.          Review of patient's allergies indicates:   Allergen Reactions    Heparin Anaphylaxis    Heparin analogues     Vancomycin      Other reaction(s): Blotchy, Hives    Opioids - morphine analogues Nausea And Vomiting      Vitals:    05/01/24 0807 05/01/24 0837   BP: 130/84    BP Location: Left arm    Patient Position: Sitting    BP Method: Medium (Manual)    Pulse: 72    Resp: 16    Temp: 97.8 °F (36.6 °C)    TempSrc: Temporal    SpO2: (!) 91% (!) 94%   Weight: 101.8 kg (224 lb 6.4 oz)    Height: 5' 11" (1.803 m)       Social History     Socioeconomic History    Marital status: Single   Tobacco Use    Smoking status: Every Day     Current packs/day: 0.50     Types: Cigarettes    Smokeless tobacco: Never    Tobacco comments:     patient reports she smokes marijuana   Substance and Sexual Activity    Alcohol use: Yes     Comment: Socially    Drug use: Yes     Types: Marijuana    Sexual activity: Yes      Family History   Problem Relation Name Age of Onset    Multiple sclerosis Mother            Objective:     Physical Exam  Vitals and nursing note reviewed.   Constitutional:       Appearance: Normal appearance. She is obese.   HENT:      Head: Normocephalic and atraumatic.      Nose: Nose normal.      Mouth/Throat:      Mouth: Mucous membranes are moist.      Pharynx: Oropharynx is clear.   Eyes:      Extraocular Movements: Extraocular movements intact.   Cardiovascular:      Rate and Rhythm: Normal rate and regular rhythm.      Pulses: Normal pulses.      Heart sounds: Normal heart sounds.   Pulmonary:      Effort: Pulmonary effort is normal. No respiratory distress.      Breath sounds: No stridor. Wheezing present. No rhonchi or rales.   Chest:      Chest wall: No tenderness.   Musculoskeletal:         General: Normal range of motion.      Cervical back: Normal range of motion.   Skin:     General: Skin is warm and dry.   Neurological:      General: No focal deficit present.      " Mental Status: She is alert and oriented to person, place, and time. Mental status is at baseline.   Psychiatric:         Mood and Affect: Mood normal.       Current Outpatient Medications on File Prior to Visit   Medication Sig Dispense Refill    albuterol (PROAIR HFA) 90 mcg/actuation inhaler Inhale 2 puffs into the lungs every 6 (six) hours as needed for Wheezing or Shortness of Breath. Rescue 18 g 0    albuterol-ipratropium (DUO-NEB) 2.5 mg-0.5 mg/3 mL nebulizer solution Take 3 mLs by nebulization every 6 (six) hours as needed for Wheezing. Rescue 75 mL 6    BENLYSTA 200 mg/mL Syrg INJECT 1 ML (200 MG TOTAL) UNDER THE SKIN ONCE A WEEK. 4 each 11    cholecalciferol, vitamin D3, 1,250 mcg (50,000 unit) capsule Take 50,000 Units by mouth every 7 days.      gabapentin (NEURONTIN) 300 MG capsule Take 1 capsule (300 mg total) by mouth 2 (two) times daily. 180 capsule 3    HYDROcodone-acetaminophen (NORCO) 7.5-325 mg per tablet Take 1 tablet by mouth every 8 (eight) hours as needed for Pain. 12 tablet 0    hydrOXYchloroQUINE (PLAQUENIL) 200 mg tablet Take 1 tablet (200 mg total) by mouth 2 (two) times daily. 180 tablet 3    magnesium oxide (MAG-OX) 400 mg (241.3 mg magnesium) tablet Take 1 tablet (400 mg total) by mouth nightly. 90 tablet 3    methocarbamoL (ROBAXIN) 500 MG Tab Take 1 tablet (500 mg total) by mouth 2 (two) times a day. 180 tablet 3    mycophenolate (CELLCEPT) 500 mg Tab Take 2 tablets (1,000 mg total) by mouth after dinner. (Patient taking differently: Take 1,000 mg by mouth after dinner. Patient takes 6 tabs daily) 180 tablet 3    naloxone (NARCAN) 4 mg/actuation Spry SMARTSIG:Both Nares      nebulizer and compressor Radha Use as directed. Dx J40 1 each 0    ondansetron (ZOFRAN-ODT) 8 MG TbDL Take 1 tablet (8 mg total) by mouth 2 (two) times daily. As needed for nausea 30 tablet 1    prasugreL (EFFIENT) 10 mg Tab Take 1 tablet (10 mg total) by mouth once daily. 90 tablet 3    predniSONE (DELTASONE) 5  MG tablet Take 5 mg by mouth daily as needed.      rivaroxaban (XARELTO) 10 mg Tab Take 1 tablet (10 mg total) by mouth daily with dinner or evening meal. 90 tablet 3    sertraline (ZOLOFT) 100 MG tablet Take 1 tablet (100 mg total) by mouth once daily. 30 tablet 11    triamcinolone acetonide 0.1% (KENALOG) 0.1 % ointment Apply topically 2 (two) times daily. 30 g 0    zolpidem (AMBIEN) 10 mg Tab Take 1 tablet (10 mg total) by mouth every evening. 90 tablet 3    [DISCONTINUED] promethazine (PHENERGAN) 25 MG tablet Take 1 tablet (25 mg total) by mouth every 6 (six) hours as needed for Nausea. 15 tablet 0    [DISCONTINUED] promethazine-dextromethorphan (PROMETHAZINE-DM) 6.25-15 mg/5 mL Syrp Take 5 mLs by mouth every 4 (four) hours as needed (cough). 180 mL 0     No current facility-administered medications on file prior to visit.     Health Maintenance   Topic Date Due    Hemoglobin A1c  09/30/2023    Mammogram  04/10/2024    Shingles Vaccine (1 of 2) 12/31/2024 (Originally 3/18/1989)    TETANUS VACCINE  04/17/2025 (Originally [birth year]18/1988)    Foot Exam  04/17/2025    Eye Exam  04/18/2025    Lipid Panel  05/01/2025    Colorectal Cancer Screening  09/20/2030    Hepatitis C Screening  Completed      Results for orders placed or performed during the hospital encounter of 04/13/24   Basic metabolic panel   Result Value Ref Range    Sodium Level 140 136 - 145 mmol/L    Potassium Level 4.2 3.5 - 5.1 mmol/L    Chloride 108 (H) 98 - 107 mmol/L    Carbon Dioxide 24 22 - 29 mmol/L    Glucose Level 163 (H) 74 - 100 mg/dL    Blood Urea Nitrogen 13.4 9.8 - 20.1 mg/dL    Creatinine 0.84 0.55 - 1.02 mg/dL    BUN/Creatinine Ratio 16     Calcium Level Total 9.1 8.4 - 10.2 mg/dL    Anion Gap 8.0 mEq/L    eGFR >60 mls/min/1.73/m2   CBC with Differential   Result Value Ref Range    WBC 12.08 (H) 4.50 - 11.50 x10(3)/mcL    RBC 4.74 4.20 - 5.40 x10(6)/mcL    Hgb 14.7 12.0 - 16.0 g/dL    Hct 44.6 37.0 - 47.0 %    MCV 94.1 (H) 80.0 - 94.0 fL     MCH 31.0 27.0 - 31.0 pg    MCHC 33.0 33.0 - 36.0 g/dL    RDW 13.8 11.5 - 17.0 %    Platelet 243 130 - 400 x10(3)/mcL    MPV 9.2 7.4 - 10.4 fL    Neut % 81.1 %    Lymph % 13.7 %    Mono % 3.1 %    Eos % 1.4 %    Basophil % 0.4 %    Lymph # 1.65 0.6 - 4.6 x10(3)/mcL    Neut # 9.80 (H) 2.1 - 9.2 x10(3)/mcL    Mono # 0.37 0.1 - 1.3 x10(3)/mcL    Eos # 0.17 0 - 0.9 x10(3)/mcL    Baso # 0.05 <=0.2 x10(3)/mcL    IG# 0.04 0 - 0.04 x10(3)/mcL    IG% 0.3 %    NRBC% 0.0 %   Troponin I   Result Value Ref Range    Troponin-I <0.010 0.000 - 0.045 ng/mL   Brain natriuretic peptide   Result Value Ref Range    Natriuretic Peptide 25.0 <=100.0 pg/mL   Light Blue Top Hold   Result Value Ref Range    Extra Tube Hold for add-ons.    Red Top Hold   Result Value Ref Range    Extra Tube Hold for add-ons.    D dimer, quantitative   Result Value Ref Range    D-Dimer 1.23 (H) 0.00 - 0.50 ug/mL FEU          Assessment & Plan:     Active Problem List with Overview Notes    Diagnosis Date Noted    Non-healing skin lesion 12/13/2023    Nausea 08/02/2023    Drug-induced immunodeficiency 08/02/2023    Advanced care planning/counseling discussion 03/20/2023    Postmenopausal 03/20/2023    Systemic lupus erythematosus, organ or system involvement unspecified     Bronchitis 12/28/2022    Breast cancer screening by mammogram 12/28/2022    Arterial insufficiency of lower extremity-chronic 08/18/2022    Medicare annual wellness visit, subsequent 06/14/2022    Asplenia 06/14/2022    Cigarette smoker 06/14/2022    Class 1 obesity due to excess calories with serious comorbidity and body mass index (BMI) of 31.0 to 31.9 in adult 06/14/2022    Skin sore 06/14/2022    Lupus anticoagulant disorder 05/05/2022    Seronegative rheumatoid arthritis 05/05/2022    Neuropathy 05/05/2022    Peripheral vascular disease 05/05/2022    Irritable mood 05/05/2022    Hepatic cirrhosis 05/05/2022    Hyperlipidemia associated with type 2 diabetes mellitus 05/05/2022     Hypothyroidism 05/05/2022    Gastroesophageal reflux disease 05/05/2022    Diabetes mellitus 05/05/2022    Congestive heart failure 05/05/2022    Lupus vasculitis 05/05/2022    Peripheral artery disease 05/05/2022    Impaired mobility 05/05/2022    Chronic pain 05/05/2022       1. Bronchitis  Assessment & Plan:  Seen in ER on 4/13/24 for this issue.  Given prednisone and sent home. At visit 4/17/24, seen in our clinic,   rx for duoneb.  Will also send in rx for cough syrup with phenergan. Cautioned may cause drowsiness so do not take before work or driving. Advised to not take her phenergan tabs while taking cough syrup.     Today she reports still not improved.  Will send in augmentin as well as refill of duoneb and cough syrup.  Continue on her prednisone.  Continue on nebs.  Cxr ordered. Will call with results when available.  Wbc on cbc from today is wnl.  Other labs still pending. Will call with results when available. Encourage fluids. Rest.     Contact clinic for concerns. Patient is agreeable to plan and verbalized understanding.     Orders:  -     promethazine-dextromethorphan (PROMETHAZINE-DM) 6.25-15 mg/5 mL Syrp; Take 5 mLs by mouth every 4 (four) hours as needed (cough).  Dispense: 240 mL; Refill: 0  -     amoxicillin-clavulanate 875-125mg (AUGMENTIN) 875-125 mg per tablet; Take 1 tablet by mouth every 12 (twelve) hours. for 10 days  Dispense: 20 tablet; Refill: 0  -     X-Ray Chest PA And Lateral; Future; Expected date: 05/01/2024         Follow up for as scheduled 4/2025 for wellness or sooner prn.

## 2024-05-22 ENCOUNTER — TELEPHONE (OUTPATIENT)
Dept: FAMILY MEDICINE | Facility: CLINIC | Age: 54
End: 2024-05-22
Payer: MEDICARE

## 2024-05-22 NOTE — TELEPHONE ENCOUNTER
Pt notified of lab results. Pt stated labs were not done fasting. Pt advised to  tubing for nebs. Pt verbalized understanding and denied having questions at this time.

## 2024-05-29 RX ORDER — RIVAROXABAN 10 MG/1
10 TABLET, FILM COATED ORAL
Qty: 90 TABLET | Refills: 0 | Status: SHIPPED | OUTPATIENT
Start: 2024-05-29

## 2024-07-22 PROBLEM — Z00.00 MEDICARE ANNUAL WELLNESS VISIT, SUBSEQUENT: Status: RESOLVED | Noted: 2022-06-14 | Resolved: 2024-07-22

## 2024-07-29 RX ORDER — PRASUGREL 10 MG/1
10 TABLET, FILM COATED ORAL DAILY
Qty: 90 TABLET | Refills: 0 | Status: SHIPPED | OUTPATIENT
Start: 2024-07-29 | End: 2025-07-29

## 2024-07-29 NOTE — TELEPHONE ENCOUNTER
----- Message from Jillian Hickey sent at 7/29/2024  1:38 PM CDT -----  Regarding: refill  .Type:  RX Refill Request    Who Called: pt  Refill or New Rx:refill  RX Name and Strength:XARELTO 10 mg Tab  How is the patient currently taking it? (ex. 1XDay):Take 1 tablet (10 mg total) by mouth daily with dinner or evening meal. -   Is this a 30 day or 90 day RX:90  Preferred Pharmacy with phone number:[id number] - [place], [place] - [address]      Local or Mail Order:  Ordering Provider:  Would the patient rather a call back or a response via [facility]? no  Best Call Back Number:  Additional Information: